# Patient Record
Sex: FEMALE | Race: WHITE | Employment: PART TIME | URBAN - METROPOLITAN AREA
[De-identification: names, ages, dates, MRNs, and addresses within clinical notes are randomized per-mention and may not be internally consistent; named-entity substitution may affect disease eponyms.]

---

## 2017-05-01 ENCOUNTER — TELEPHONE (OUTPATIENT)
Dept: PEDIATRICS | Facility: CLINIC | Age: 37
End: 2017-05-01

## 2017-08-26 ENCOUNTER — TRANSFERRED RECORDS (OUTPATIENT)
Dept: HEALTH INFORMATION MANAGEMENT | Facility: CLINIC | Age: 37
End: 2017-08-26

## 2017-08-27 ENCOUNTER — ANESTHESIA (OUTPATIENT)
Dept: SURGERY | Facility: CLINIC | Age: 37
End: 2017-08-27
Payer: COMMERCIAL

## 2017-08-27 ENCOUNTER — ANESTHESIA EVENT (OUTPATIENT)
Dept: SURGERY | Facility: CLINIC | Age: 37
End: 2017-08-27
Payer: COMMERCIAL

## 2017-08-27 ENCOUNTER — APPOINTMENT (OUTPATIENT)
Dept: ULTRASOUND IMAGING | Facility: CLINIC | Age: 37
End: 2017-08-27
Attending: EMERGENCY MEDICINE
Payer: COMMERCIAL

## 2017-08-27 ENCOUNTER — SURGERY (OUTPATIENT)
Age: 37
End: 2017-08-27

## 2017-08-27 ENCOUNTER — HOSPITAL ENCOUNTER (EMERGENCY)
Facility: CLINIC | Age: 37
Discharge: HOME | End: 2017-08-27
Attending: EMERGENCY MEDICINE | Admitting: OBSTETRICS & GYNECOLOGY
Payer: COMMERCIAL

## 2017-08-27 VITALS
TEMPERATURE: 97.8 F | SYSTOLIC BLOOD PRESSURE: 89 MMHG | WEIGHT: 131 LBS | RESPIRATION RATE: 16 BRPM | HEART RATE: 102 BPM | OXYGEN SATURATION: 100 % | HEIGHT: 66 IN | BODY MASS INDEX: 21.05 KG/M2 | DIASTOLIC BLOOD PRESSURE: 51 MMHG

## 2017-08-27 DIAGNOSIS — O00.109 TUBAL PREGNANCY WITHOUT INTRAUTERINE PREGNANCY: ICD-10-CM

## 2017-08-27 DIAGNOSIS — Z98.890 S/P LAPAROSCOPIC SURGERY: Primary | ICD-10-CM

## 2017-08-27 LAB
ABO + RH BLD: NORMAL
ANION GAP SERPL CALCULATED.3IONS-SCNC: 8 MMOL/L (ref 3–14)
B-HCG SERPL-ACNC: 190 IU/L (ref 0–5)
BASOPHILS # BLD AUTO: 0 10E9/L (ref 0–0.2)
BASOPHILS NFR BLD AUTO: 0.2 %
BLD GP AB SCN SERPL QL: NORMAL
BLOOD BANK CMNT PATIENT-IMP: NORMAL
BUN SERPL-MCNC: 11 MG/DL (ref 7–30)
CALCIUM SERPL-MCNC: 9 MG/DL (ref 8.5–10.1)
CHLORIDE SERPL-SCNC: 104 MMOL/L (ref 94–109)
CO2 SERPL-SCNC: 25 MMOL/L (ref 20–32)
CREAT SERPL-MCNC: 0.71 MG/DL (ref 0.52–1.04)
DIFFERENTIAL METHOD BLD: ABNORMAL
DIFFERENTIAL METHOD BLD: NORMAL
EOSINOPHIL # BLD AUTO: 0.1 10E9/L (ref 0–0.7)
EOSINOPHIL NFR BLD AUTO: 0.5 %
ERYTHROCYTE [DISTWIDTH] IN BLOOD BY AUTOMATED COUNT: 12.4 % (ref 10–15)
ERYTHROCYTE [DISTWIDTH] IN BLOOD BY AUTOMATED COUNT: NORMAL % (ref 10–15)
FETAL CELL SCN BLD QL ROSETTE: NORMAL
GFR SERPL CREATININE-BSD FRML MDRD: >90 ML/MIN/1.7M2
GLUCOSE SERPL-MCNC: 100 MG/DL (ref 70–99)
HCT VFR BLD AUTO: 32.8 % (ref 35–47)
HCT VFR BLD AUTO: NORMAL % (ref 35–47)
HGB BLD-MCNC: 11.3 G/DL (ref 11.7–15.7)
HGB BLD-MCNC: NORMAL G/DL (ref 11.7–15.7)
IMM GRANULOCYTES # BLD: 0 10E9/L (ref 0–0.4)
IMM GRANULOCYTES NFR BLD: 0.2 %
LYMPHOCYTES # BLD AUTO: 1.8 10E9/L (ref 0.8–5.3)
LYMPHOCYTES NFR BLD AUTO: 14.8 %
MCH RBC QN AUTO: 31.6 PG (ref 26.5–33)
MCH RBC QN AUTO: NORMAL PG (ref 26.5–33)
MCHC RBC AUTO-ENTMCNC: 34.5 G/DL (ref 31.5–36.5)
MCHC RBC AUTO-ENTMCNC: NORMAL G/DL (ref 31.5–36.5)
MCV RBC AUTO: 92 FL (ref 78–100)
MCV RBC AUTO: NORMAL FL (ref 78–100)
MONOCYTES # BLD AUTO: 0.5 10E9/L (ref 0–1.3)
MONOCYTES NFR BLD AUTO: 4.1 %
NEUTROPHILS # BLD AUTO: 9.6 10E9/L (ref 1.6–8.3)
NEUTROPHILS NFR BLD AUTO: 80.2 %
NRBC # BLD AUTO: 0 10*3/UL
NRBC BLD AUTO-RTO: 0 /100
PLATELET # BLD AUTO: 274 10E9/L (ref 150–450)
PLATELET # BLD AUTO: NORMAL 10E9/L (ref 150–450)
POTASSIUM SERPL-SCNC: 4.1 MMOL/L (ref 3.4–5.3)
RADIOLOGIST FLAGS: ABNORMAL
RBC # BLD AUTO: 3.58 10E12/L (ref 3.8–5.2)
RBC # BLD AUTO: NORMAL 10E12/L (ref 3.8–5.2)
RH IG VIALS RECOM PATIENT: NORMAL
SODIUM SERPL-SCNC: 137 MMOL/L (ref 133–144)
SPECIMEN EXP DATE BLD: NORMAL
SPECIMEN EXP DATE BLD: NORMAL
WBC # BLD AUTO: 12 10E9/L (ref 4–11)
WBC # BLD AUTO: NORMAL 10E9/L (ref 4–11)

## 2017-08-27 PROCEDURE — 88305 TISSUE EXAM BY PATHOLOGIST: CPT | Mod: 26 | Performed by: OBSTETRICS & GYNECOLOGY

## 2017-08-27 PROCEDURE — 80048 BASIC METABOLIC PNL TOTAL CA: CPT | Performed by: EMERGENCY MEDICINE

## 2017-08-27 PROCEDURE — 85025 COMPLETE CBC W/AUTO DIFF WBC: CPT | Performed by: EMERGENCY MEDICINE

## 2017-08-27 PROCEDURE — 25000128 H RX IP 250 OP 636: Performed by: EMERGENCY MEDICINE

## 2017-08-27 PROCEDURE — 84702 CHORIONIC GONADOTROPIN TEST: CPT | Performed by: EMERGENCY MEDICINE

## 2017-08-27 PROCEDURE — 96360 HYDRATION IV INFUSION INIT: CPT

## 2017-08-27 PROCEDURE — 76830 TRANSVAGINAL US NON-OB: CPT

## 2017-08-27 PROCEDURE — 37000008 ZZH ANESTHESIA TECHNICAL FEE, 1ST 30 MIN: Performed by: OBSTETRICS & GYNECOLOGY

## 2017-08-27 PROCEDURE — 25000128 H RX IP 250 OP 636: Performed by: NURSE ANESTHETIST, CERTIFIED REGISTERED

## 2017-08-27 PROCEDURE — 76801 OB US < 14 WKS SINGLE FETUS: CPT

## 2017-08-27 PROCEDURE — 25000125 ZZHC RX 250: Performed by: OBSTETRICS & GYNECOLOGY

## 2017-08-27 PROCEDURE — 25000128 H RX IP 250 OP 636: Performed by: ANESTHESIOLOGY

## 2017-08-27 PROCEDURE — 71000013 ZZH RECOVERY PHASE 1 LEVEL 1 EA ADDTL HR: Performed by: OBSTETRICS & GYNECOLOGY

## 2017-08-27 PROCEDURE — 25000125 ZZHC RX 250: Performed by: NURSE ANESTHETIST, CERTIFIED REGISTERED

## 2017-08-27 PROCEDURE — 88305 TISSUE EXAM BY PATHOLOGIST: CPT | Performed by: OBSTETRICS & GYNECOLOGY

## 2017-08-27 PROCEDURE — 27210794 ZZH OR GENERAL SUPPLY STERILE: Performed by: OBSTETRICS & GYNECOLOGY

## 2017-08-27 PROCEDURE — 86901 BLOOD TYPING SEROLOGIC RH(D): CPT | Performed by: EMERGENCY MEDICINE

## 2017-08-27 PROCEDURE — 25000566 ZZH SEVOFLURANE, EA 15 MIN: Performed by: OBSTETRICS & GYNECOLOGY

## 2017-08-27 PROCEDURE — 99284 EMERGENCY DEPT VISIT MOD MDM: CPT | Mod: 25

## 2017-08-27 PROCEDURE — 86850 RBC ANTIBODY SCREEN: CPT | Performed by: EMERGENCY MEDICINE

## 2017-08-27 PROCEDURE — 25000128 H RX IP 250 OP 636: Performed by: SURGERY

## 2017-08-27 PROCEDURE — 85461 HEMOGLOBIN FETAL: CPT | Performed by: EMERGENCY MEDICINE

## 2017-08-27 PROCEDURE — 37000009 ZZH ANESTHESIA TECHNICAL FEE, EACH ADDTL 15 MIN: Performed by: OBSTETRICS & GYNECOLOGY

## 2017-08-27 PROCEDURE — 36000058 ZZH SURGERY LEVEL 3 EA 15 ADDTL MIN: Performed by: OBSTETRICS & GYNECOLOGY

## 2017-08-27 PROCEDURE — S0020 INJECTION, BUPIVICAINE HYDRO: HCPCS | Performed by: OBSTETRICS & GYNECOLOGY

## 2017-08-27 PROCEDURE — 40000170 ZZH STATISTIC PRE-PROCEDURE ASSESSMENT II: Performed by: OBSTETRICS & GYNECOLOGY

## 2017-08-27 PROCEDURE — 71000012 ZZH RECOVERY PHASE 1 LEVEL 1 FIRST HR: Performed by: OBSTETRICS & GYNECOLOGY

## 2017-08-27 PROCEDURE — 36000056 ZZH SURGERY LEVEL 3 1ST 30 MIN: Performed by: OBSTETRICS & GYNECOLOGY

## 2017-08-27 PROCEDURE — 86900 BLOOD TYPING SEROLOGIC ABO: CPT | Performed by: EMERGENCY MEDICINE

## 2017-08-27 RX ORDER — GLYCOPYRROLATE 0.2 MG/ML
INJECTION, SOLUTION INTRAMUSCULAR; INTRAVENOUS PRN
Status: DISCONTINUED | OUTPATIENT
Start: 2017-08-27 | End: 2017-08-27

## 2017-08-27 RX ORDER — FENTANYL CITRATE 0.05 MG/ML
25-50 INJECTION, SOLUTION INTRAMUSCULAR; INTRAVENOUS
Status: DISCONTINUED | OUTPATIENT
Start: 2017-08-27 | End: 2017-08-27 | Stop reason: HOSPADM

## 2017-08-27 RX ORDER — SODIUM CHLORIDE, SODIUM LACTATE, POTASSIUM CHLORIDE, CALCIUM CHLORIDE 600; 310; 30; 20 MG/100ML; MG/100ML; MG/100ML; MG/100ML
INJECTION, SOLUTION INTRAVENOUS CONTINUOUS
Status: DISCONTINUED | OUTPATIENT
Start: 2017-08-27 | End: 2017-08-27 | Stop reason: HOSPADM

## 2017-08-27 RX ORDER — OXYCODONE HYDROCHLORIDE 5 MG/1
5-10 TABLET ORAL
Status: CANCELLED | OUTPATIENT
Start: 2017-08-27

## 2017-08-27 RX ORDER — ACETAMINOPHEN 325 MG/1
650 TABLET ORAL
Status: CANCELLED | OUTPATIENT
Start: 2017-08-27

## 2017-08-27 RX ORDER — CHLORHEXIDINE GLUCONATE 40 MG/ML
SOLUTION TOPICAL ONCE
Status: DISCONTINUED | OUTPATIENT
Start: 2017-08-27 | End: 2017-08-27 | Stop reason: HOSPADM

## 2017-08-27 RX ORDER — OXYCODONE HYDROCHLORIDE 5 MG/1
5-10 TABLET ORAL EVERY 4 HOURS PRN
Qty: 20 TABLET | Refills: 0 | Status: SHIPPED | OUTPATIENT
Start: 2017-08-27 | End: 2018-05-15

## 2017-08-27 RX ORDER — LIDOCAINE HYDROCHLORIDE 20 MG/ML
INJECTION, SOLUTION INFILTRATION; PERINEURAL PRN
Status: DISCONTINUED | OUTPATIENT
Start: 2017-08-27 | End: 2017-08-27

## 2017-08-27 RX ORDER — ACETAMINOPHEN 10 MG/ML
1000 INJECTION, SOLUTION INTRAVENOUS ONCE
Status: DISCONTINUED | OUTPATIENT
Start: 2017-08-27 | End: 2017-08-27 | Stop reason: HOSPADM

## 2017-08-27 RX ORDER — ACETAMINOPHEN 325 MG/1
650 TABLET ORAL EVERY 4 HOURS PRN
Qty: 100 TABLET | Refills: 0 | Status: SHIPPED | OUTPATIENT
Start: 2017-08-27

## 2017-08-27 RX ORDER — BUPIVACAINE HYDROCHLORIDE 5 MG/ML
INJECTION, SOLUTION PERINEURAL PRN
Status: DISCONTINUED | OUTPATIENT
Start: 2017-08-27 | End: 2017-08-27 | Stop reason: HOSPADM

## 2017-08-27 RX ORDER — VECURONIUM BROMIDE 1 MG/ML
INJECTION, POWDER, LYOPHILIZED, FOR SOLUTION INTRAVENOUS PRN
Status: DISCONTINUED | OUTPATIENT
Start: 2017-08-27 | End: 2017-08-27

## 2017-08-27 RX ORDER — IBUPROFEN 600 MG/1
600 TABLET, FILM COATED ORAL EVERY 6 HOURS PRN
Qty: 30 TABLET | Refills: 0 | Status: SHIPPED | OUTPATIENT
Start: 2017-08-27 | End: 2018-05-15

## 2017-08-27 RX ORDER — ONDANSETRON 4 MG/1
4 TABLET, ORALLY DISINTEGRATING ORAL
Status: CANCELLED | OUTPATIENT
Start: 2017-08-27

## 2017-08-27 RX ORDER — KETOROLAC TROMETHAMINE 30 MG/ML
30 INJECTION, SOLUTION INTRAMUSCULAR; INTRAVENOUS
Status: DISCONTINUED | OUTPATIENT
Start: 2017-08-27 | End: 2017-08-27 | Stop reason: HOSPADM

## 2017-08-27 RX ORDER — MEPERIDINE HYDROCHLORIDE 25 MG/ML
12.5 INJECTION INTRAMUSCULAR; INTRAVENOUS; SUBCUTANEOUS EVERY 5 MIN PRN
Status: DISCONTINUED | OUTPATIENT
Start: 2017-08-27 | End: 2017-08-27 | Stop reason: HOSPADM

## 2017-08-27 RX ORDER — PROPOFOL 10 MG/ML
INJECTION, EMULSION INTRAVENOUS PRN
Status: DISCONTINUED | OUTPATIENT
Start: 2017-08-27 | End: 2017-08-27

## 2017-08-27 RX ORDER — EPHEDRINE SULFATE 50 MG/ML
INJECTION, SOLUTION INTRAMUSCULAR; INTRAVENOUS; SUBCUTANEOUS PRN
Status: DISCONTINUED | OUTPATIENT
Start: 2017-08-27 | End: 2017-08-27

## 2017-08-27 RX ORDER — KETOROLAC TROMETHAMINE 30 MG/ML
INJECTION, SOLUTION INTRAMUSCULAR; INTRAVENOUS PRN
Status: DISCONTINUED | OUTPATIENT
Start: 2017-08-27 | End: 2017-08-27

## 2017-08-27 RX ORDER — PROPOFOL 10 MG/ML
INJECTION, EMULSION INTRAVENOUS CONTINUOUS PRN
Status: DISCONTINUED | OUTPATIENT
Start: 2017-08-27 | End: 2017-08-27

## 2017-08-27 RX ORDER — ONDANSETRON 2 MG/ML
4 INJECTION INTRAMUSCULAR; INTRAVENOUS EVERY 30 MIN PRN
Status: DISCONTINUED | OUTPATIENT
Start: 2017-08-27 | End: 2017-08-27 | Stop reason: HOSPADM

## 2017-08-27 RX ORDER — NEOSTIGMINE METHYLSULFATE 1 MG/ML
VIAL (ML) INJECTION PRN
Status: DISCONTINUED | OUTPATIENT
Start: 2017-08-27 | End: 2017-08-27

## 2017-08-27 RX ORDER — ONDANSETRON 2 MG/ML
INJECTION INTRAMUSCULAR; INTRAVENOUS PRN
Status: DISCONTINUED | OUTPATIENT
Start: 2017-08-27 | End: 2017-08-27

## 2017-08-27 RX ORDER — ONDANSETRON 4 MG/1
4 TABLET, ORALLY DISINTEGRATING ORAL EVERY 30 MIN PRN
Status: DISCONTINUED | OUTPATIENT
Start: 2017-08-27 | End: 2017-08-27 | Stop reason: HOSPADM

## 2017-08-27 RX ORDER — METOPROLOL TARTRATE 1 MG/ML
1-2 INJECTION, SOLUTION INTRAVENOUS EVERY 5 MIN PRN
Status: DISCONTINUED | OUTPATIENT
Start: 2017-08-27 | End: 2017-08-27 | Stop reason: HOSPADM

## 2017-08-27 RX ORDER — DEXAMETHASONE SODIUM PHOSPHATE 4 MG/ML
INJECTION, SOLUTION INTRA-ARTICULAR; INTRALESIONAL; INTRAMUSCULAR; INTRAVENOUS; SOFT TISSUE PRN
Status: DISCONTINUED | OUTPATIENT
Start: 2017-08-27 | End: 2017-08-27

## 2017-08-27 RX ADMIN — LIDOCAINE HYDROCHLORIDE 80 MG: 20 INJECTION, SOLUTION INFILTRATION; PERINEURAL at 05:28

## 2017-08-27 RX ADMIN — PROCHLORPERAZINE EDISYLATE 5 MG: 5 INJECTION INTRAMUSCULAR; INTRAVENOUS at 07:11

## 2017-08-27 RX ADMIN — GLYCOPYRROLATE 0.4 MG: 0.2 INJECTION, SOLUTION INTRAMUSCULAR; INTRAVENOUS at 06:43

## 2017-08-27 RX ADMIN — PROPOFOL 160 MG: 10 INJECTION, EMULSION INTRAVENOUS at 05:28

## 2017-08-27 RX ADMIN — HYDROMORPHONE HYDROCHLORIDE 0.5 MG: 1 INJECTION, SOLUTION INTRAMUSCULAR; INTRAVENOUS; SUBCUTANEOUS at 07:15

## 2017-08-27 RX ADMIN — Medication 7.5 MG: at 05:53

## 2017-08-27 RX ADMIN — PHENYLEPHRINE HYDROCHLORIDE 50 MCG: 10 INJECTION, SOLUTION INTRAMUSCULAR; INTRAVENOUS; SUBCUTANEOUS at 05:47

## 2017-08-27 RX ADMIN — SODIUM CHLORIDE, POTASSIUM CHLORIDE, SODIUM LACTATE AND CALCIUM CHLORIDE: 600; 310; 30; 20 INJECTION, SOLUTION INTRAVENOUS at 05:53

## 2017-08-27 RX ADMIN — ONDANSETRON 4 MG: 2 INJECTION INTRAMUSCULAR; INTRAVENOUS at 06:36

## 2017-08-27 RX ADMIN — SUCCINYLCHOLINE CHLORIDE 70 MG: 20 INJECTION, SOLUTION INTRAMUSCULAR; INTRAVENOUS at 05:28

## 2017-08-27 RX ADMIN — SODIUM CHLORIDE, POTASSIUM CHLORIDE, SODIUM LACTATE AND CALCIUM CHLORIDE 25 ML/HR: 600; 310; 30; 20 INJECTION, SOLUTION INTRAVENOUS at 05:17

## 2017-08-27 RX ADMIN — DEXAMETHASONE SODIUM PHOSPHATE 4 MG: 4 INJECTION, SOLUTION INTRA-ARTICULAR; INTRALESIONAL; INTRAMUSCULAR; INTRAVENOUS; SOFT TISSUE at 05:50

## 2017-08-27 RX ADMIN — PHENYLEPHRINE HYDROCHLORIDE 50 MCG: 10 INJECTION, SOLUTION INTRAMUSCULAR; INTRAVENOUS; SUBCUTANEOUS at 05:42

## 2017-08-27 RX ADMIN — SODIUM CHLORIDE 1000 ML: 9 INJECTION, SOLUTION INTRAVENOUS at 02:41

## 2017-08-27 RX ADMIN — SODIUM CHLORIDE, POTASSIUM CHLORIDE, SODIUM LACTATE AND CALCIUM CHLORIDE: 600; 310; 30; 20 INJECTION, SOLUTION INTRAVENOUS at 06:45

## 2017-08-27 RX ADMIN — VECURONIUM BROMIDE 3 MG: 1 INJECTION, POWDER, LYOPHILIZED, FOR SOLUTION INTRAVENOUS at 05:39

## 2017-08-27 RX ADMIN — DEXMEDETOMIDINE HYDROCHLORIDE 8 MCG: 100 INJECTION, SOLUTION INTRAVENOUS at 06:42

## 2017-08-27 RX ADMIN — KETOROLAC TROMETHAMINE 30 MG: 30 INJECTION, SOLUTION INTRAMUSCULAR at 06:39

## 2017-08-27 RX ADMIN — PROPOFOL 100 MCG/KG/MIN: 10 INJECTION, EMULSION INTRAVENOUS at 05:47

## 2017-08-27 RX ADMIN — MIDAZOLAM HYDROCHLORIDE 2 MG: 1 INJECTION, SOLUTION INTRAMUSCULAR; INTRAVENOUS at 05:23

## 2017-08-27 RX ADMIN — BUPIVACAINE HYDROCHLORIDE 6 ML: 5 INJECTION, SOLUTION PERINEURAL at 06:39

## 2017-08-27 RX ADMIN — NEOSTIGMINE METHYLSULFATE 3 MG: 1 INJECTION INTRAMUSCULAR; INTRAVENOUS; SUBCUTANEOUS at 06:43

## 2017-08-27 ASSESSMENT — ENCOUNTER SYMPTOMS
DYSRHYTHMIAS: 0
ABDOMINAL PAIN: 1

## 2017-08-27 NOTE — DISCHARGE INSTRUCTIONS
Same Day Surgery Discharge Instructions for  Sedation and General Anesthesia       It's not unusual to feel dizzy, light-headed or faint for up to 24 hours after surgery or while taking pain medication.  If you have these symptoms: sit for a few minutes before standing and have someone assist you when you get up to walk or use the bathroom.      You should rest and relax for the next 24 hours. We recommend you make arrangements to have an adult stay with you for at least 24 hours after your discharge.  Avoid hazardous and strenuous activity.      DO NOT DRIVE any vehicle or operate mechanical equipment for 24 hours following the end of your surgery.  Even though you may feel normal, your reactions may be affected by the medication you have received.      Do not drink alcoholic beverages for 24 hours following surgery.       Slowly progress to your regular diet as you feel able. It's not unusual to feel nauseated and/or vomit after receiving anesthesia.  If you develop these symptoms, drink clear liquids (apple juice, ginger ale, broth, 7-up, etc. ) until you feel better.  If your nausea and vomiting persists for 24 hours, please notify your surgeon.        All narcotic pain medications, along with inactivity and anesthesia, can cause constipation. Drinking plenty of liquids and increasing fiber intake will help.      For any questions of a medical nature, call your surgeon.      Do not make important decisions for 24 hours.      If you had general anesthesia, you may have a sore throat for a couple of days related to the breathing tube used during surgery.  You may use Cepacol lozenges to help with this discomfort.  If it worsens or if you develop a fever, contact your surgeon.       If you feel your pain is not well managed with the pain medications prescribed by your surgeon, please contact your surgeon's office to let them know so they can address your concerns.         **If you have questions or concerns about  your procedure,   call Dr. Tong at [phone]**

## 2017-08-27 NOTE — ED PROVIDER NOTES
History     Chief Complaint:  Abdominal Pain     HPI   Yvette Garcia is a 36 year old female who presents to the emergency department for evaluation of abdominal pain and vaginal bleeding. The patient states that she has had approximately 1.5 months of spotty vaginal bleeding, often requiring a single pad or tampon daily, though is currently on oral contraceptives.The patient stopped taking this medication two days ago per her OB/GYN's recommendation and is scheduled to start a different contraceptive next week. She was seen on 8/1 by her OB/GYN for this bleeding, and had a normal pelvic exam at that time. She notes that since then. she has had two episodes of sudden, sharp suprapubic abdominal pain in the past three weeks, which have generally resolved spontaneously after approximately 1-2 hours. She notes that she then had another episode of this pain this evening while at her cabin in [place] and decided to seek evaluation in the [facility] ER. At this visit, the patient was found to have a positive HCG urine and was sent here to [facility] for further evaluation, including US. The patient notes that her pain is improved here on arrival to the ED, though has not resolved completely. She notes that she has three healthy children, youngest 15 months in age, since she delivered vaginally without issue, with the exception of postdelivery hemorrhage with her first children.     Allergies:  NKDA    Medications:    Albuterol   Oral contraceptives     Past Medical History:    Asthma    Past Surgical History:    ENT surgery    Family History:    hyperlipidemia    Social History:  Presents with her .   Negative for tobacco use.  Negative for alcohol use.  Marital Status:   [2]    Review of Systems   Gastrointestinal: Positive for abdominal pain.   Genitourinary: Positive for vaginal bleeding.   All other systems reviewed and are negative.      Physical Exam   First Vitals:  BP:  "126/80  Heart Rate: 121  Temp: 98.6  F (37  C)  Resp: 16  Height: 167.6 cm (5' 6\")  Weight: 59.4 kg (131 lb)  SpO2: 100 %      Physical Exam  General:                        Well-nourished                        Speaking in full sentences  Eyes:                        Conjunctiva without injection or scleral icterus                        PERRL  ENT:                        Moist mucous membranes                        Posterior oropharynx clear without erythema or exudate                        Nares patent                        Pinnae normal  Neck:                        Full ROM                        No stiffness appreciated  Resp:                        Lungs CTAB                        No crackles, wheezing or audible rubs                        Good air movement  CV:                                        Tachycardic rate, regular rhythm                        S1 and S2 present                        No murmur, gallop or rub  GI:                        BS present                        Abdomen soft without distention                        Mild tenderness to palpation in supra-pubic region                        No guarding or rebound tenderness  Skin:                        Warm, dry, well perfused                        No rashes or open wounds on exposed skin  MSK:                        Moves all extremities                        No focal deformities or swelling  Neuro:                        Alert                        Answers questions appropriately                        Moves all extremities equally                        Gait stable  Psych:                        Normal affect, normal mood     Emergency Department Course   Imaging:  Radiographic findings were communicated with the patient who voiced understanding of the findings.    US OB <14 weeks with transvaginal:  1. No IUP.  2. With positive pregnancy test the differential includes an early  pregnancy which is not yet seen, fetal demise, or " ectopic pregnancy.  3. A complex mass in the left adnexal region is indeterminate. Ectopic  pregnancy is a definite possibility. Alternatively, some of this could  be due to a thickened left fallopian tube and/or some focal hemorrhage  in the left adnexal region.  4. Recommend correlation with serial quantitative beta HCG levels. As per radiology.     Laboratory:  CBC: WBC: 12.0 (H), HGB: 11.3 (L), PLT: 274  BMP: Glucose 100 (H), o/w WNL (Creatinine: 0.71)    Rh Type: O positive     HCG quantitative pregnancy: 190 (H)    Interventions:  0241 NS 1L IV    Emergency Department Course:  Nursing notes and vitals reviewed. 0136 I performed an exam of the patient as documented above.     IV inserted. Medicine administered as documented above. Blood drawn. This was sent to the lab for further testing, results above.    The patient was sent for a OB US while in the emergency department, findings above.     0304 I consulted with Dr. Parekh, radiology, regarding the patient's imaging here in the emergency department.    0306 I rechecked the patient and discussed the results of her workup thus far.     0324 I consulted with Dr. Tong, OB/GYN, regarding the patient's history and presentation here in the emergency department. They agreed to evaluate the patient here in the ED.     0510 Dr. Tong is here in the ED at the patient's bedside. We are in agreement with plan for admission.     Findings and plan explained to the Patient who consents to admission. Discussed the patient with Dr. Tong, who will admit the patient to a medical bed for further monitoring, evaluation, and treatment.    Impression & Plan    Medical Decision Making:  Yvette Garcia is a 36 year old female who present to the ED for evaluation of positive pregnancy test, abdominal pain, and vaginal spotting. Vital signs on presentation reveal elevated heart rate, with improved during her ED course. Workup in the ED included US and laboratory studies.  Symptoms are concerning for ectopic pregnancy. Pelvis US demonstrates no IUP, though findings of a complex mass in the left adnexal region. Quantitative HCG elevated at 190. The patient is Rh positive and thus does not require Rhogam. Hemoglobin is slightly low at 11.3, down from 12.7 last year. Case discussed with Dr. Tong of OB/GYN. They have graciously seen and evaluated the patient here in the ED. The patient will be made NPO in anticipation of operative intervention for further evaluation of the above abnormality. The patient and  were updated at bedside. Symptoms were well controlled.  Patient will go to the OR for diagnostic laparoscopy with Dr. Tong. Questions answered prior to admission.     Diagnosis:    ICD-10-CM   1. Tubal pregnancy without intrauterine pregnancy O00.10       Disposition:  Admitted to Dr. Tong, OB/GYN    IPetrona am serving as a scribe on 8/27/2017 at 1:36 AM to personally document services performed by Ky Chew MD based on my observations and the provider's statements to me.     Petrona Barillas  8/27/2017    EMERGENCY DEPARTMENT       Ky Chew MD  08/27/17 0710

## 2017-08-27 NOTE — ANESTHESIA POSTPROCEDURE EVALUATION
Patient: Yvette Garcia    Procedure(s):  DIAGNOSTIC LAPAROSCOPY, LEFT SALPINGECTOMY - Wound Class: I-Clean    Diagnosis:ectopic pregnancy  Diagnosis Additional Information: No value filed.    Anesthesia Type:  General, RSI, ETT    Note:  Anesthesia Post Evaluation    Patient location during evaluation: PACU  Patient participation: Able to fully participate in evaluation  Level of consciousness: sleepy but conscious and responsive to verbal stimuli  Pain management: adequate  Airway patency: patent  Cardiovascular status: acceptable and hemodynamically stable  Respiratory status: acceptable and unassisted  Hydration status: acceptable  PONV: none     Anesthetic complications: None          Last vitals:  Vitals:    08/27/17 0710 08/27/17 0715 08/27/17 0720   BP: (!) 77/59  91/65   Pulse:      Resp: 19  12   Temp:      SpO2: 99% 99% 97%         Electronically Signed By: Ponce Jain MD  August 27, 2017  7:25 AM

## 2017-08-27 NOTE — PROCEDURES
BRIEF AND FULL OPERATIVE NOTE     Preoperative Diagnosis:    1. Pelvic pain  2. Abnormal uterine bleeding  3. Positive pregnancy test  4. Left adnexal mass with no intrauterine pregnancy identified on ultrasound    Postoperative Diagnosis:    Same + left ectopic pregnancy with hemoperitoneum      Procedure:   Diagnostic laparoscopy, left salpingectomy     Surgeon:  Yvette Tong MD     Assistant: Kaylee Bob MD     Anesthesia: General     Findings: Anteverted uterus on bimanual exam, old blood at cervical os, no dilation noted, pelvis with ~100mL of hemoperitoneum, normal appearing uterus, normal appearing ovaries bilaterally, right tube normal in appearance, left fallopian tube dilated distally with large blood clot extruding from fimbria appearing consistent with ectopic pregnancy no evidence of tubal rupture blood appeared to be extruding through the fimbria     EBL: 10mL for procedure, 100mL hemoperitoneum    UOP: 800mL     Specimens:   Left fallopian tube with ectopic pregnancy     Indication for procedure:  Yvette Garcia is a 37 y/o  at unknown gestational age who presented to the emergency department with abnormal uterine bleeding, pelvic pain and new positive pregnancy test.  Ultrasound in the emergency department revealed a heterogenous mass of the left adnexa with thin endometrial stripe and no evidence of IUP, it also demonstrated free fluid. Given the above findings there was significant concern for ectopic pregnancy.  We discussed that given the above surgical management is highly recommended.  We reviewed the risks, benefits and alternatives of laparoscopy.  We discussed that if an ectopic pregnancy is identified that salpingectomy would be recommended.  We discussed that she can still maintain her fertility despite the loss of one fallopian tube.  Consent form was reviewed and signed prior to proceeding to the operating room.     Description of the procedure:  The patient was taken to  the operating room where general anesthesia was administered without difficulty.  She was placed in the dorsal lithotomy position in [facility].  Examination under anesthesia revealed a small anteverted uterus with fullness in the left adnexa.  She was then prepped and draped in the normal sterile fashion.  A time out was performed with two identifiers.  A haynes was then placed under sterile conditions.  An open-sided speculum was then placed in the vagina and the cervix was easily visualized.  The anterior lip of the cervix was then grasped with a single-tooth tenaculum.  An acorn uterine manipulator was then placed without difficulty.  Attention was then turned to the abdomen where 0.50% Marcaine was injected at the umbilicus and an infraumbilical incision was made with the scalpel.  The veress needle was then used to access the peritoneal cavity.  Confirmation of placement with saline drop test was noted.  The abdomen was then insufflated with CO2 gas to 12mmHg.  The veress needle was then removed.  A 5 mm trocar was then inserted under direct visualization.  Placement was confirmed with the laparoscope.  The patient was then placement in trendelenburg to facilitate visualization.  The pelvis was free of adhesions and the aforementioned findings were noted, significant for a hemoperitoneum and dilated left fallopian tube.  A 5 mm trocar was then placed in the right lower quadrant under direct visualization.  An 11 mm trocar was then inserted in the left lower quadrant under direct visualization.  A suction  was then used to remove the noted hemoperitoneum.  The patient's left fallopian tube which was distended and consistent with presence of ectopic pregnancy was then grasped and elevated and the Ligasure device was then utilized to cauterize and divide the fallopian tube from the mesosalpinx in multiple passes.  The fallopian tube was then resected with the Ligasure at the cornua and the fallopian tube  brought out through the 11 mm port in an Endocatch bag.  The specimen was large and the bag was required to be opened outside of the incision, the skin and fascia of the incision was also slightly extended.  The tissue was then removed in smaller pieces with a nik clamp.  The entire specimen was removed.  The fallopian tube and ectopic pregnancy will be sent to pathology.  The pelvis was then copiously irrigated.  The patient was placed in reverse Trendelenburg to facilitate removal of hemoperitoneum and returned to Trendelenburg. The mesosalpinx was inspected and noted to be hemostatic.  The 11-mm port was then closed with a laparoscopic closure device and with 0-Vicryl suture.  CO2 gas was slowly removed from the abdomen and good hemostasis was maintained.  All of the instruments were then removed from the abdomen and remainder of pneumoperitoneum was released.  The 5-mm incisions were closed with 4-0 vicryl, the extended 11 mm trocar site was closed with 4-0 Monocryl in subcuticular fashion and dressed with steri-strips and band-aids.  All instruments were then removed from the vagina.  The haynes catheter was also removed.  The patient tolerated the procedure well.  Sponge, lap and needle counts were correct x 2.  The patient was awakened from general anesthesia and taken to the recovery room in stable condition.    Electronically signed by:  Yvette Tong  [facility]  Pager: [phone]  August 27, 2017

## 2017-08-27 NOTE — ANESTHESIA PREPROCEDURE EVALUATION
Procedure: Procedure(s):  LAPAROSCOPY DIAGNOSTIC (GYN)  Preop diagnosis: ectopic pregnancy    Allergies   Allergen Reactions     Seasonal Allergies      Past Medical History:   Diagnosis Date     History of asthma     as a child     Past Surgical History:   Procedure Laterality Date     C ORAL SURGERY PROCEDURE  1998     CENTRAL LINE      with hemorrhage after delivery of baby     wisdom teeth  2003     Prior to Admission medications    Medication Sig Start Date End Date Taking? Authorizing Provider   order for DME Equipment being ordered: Wrist brace 11/3/16   Ty Flynn MD   ibuprofen (ADVIL,MOTRIN) 400 MG tablet Take 1 tablet (400 mg) by mouth every 6 hours as needed for other (cramping) 5/19/16   Linda Russell MD   albuterol (PROAIR HFA, PROVENTIL HFA, VENTOLIN HFA) 108 (90 BASE) MCG/ACT inhaler Inhale 2 puffs into the lungs every 6 hours    Reported, Patient     Current Facility-Administered Medications Ordered in Epic   Medication Dose Route Frequency Last Rate Last Dose     Blood Bank will determine if patient is eligible for and the proper dosage of Rho (D) immune globulin (RhoGam)   Does not apply Continuous PRN         NO Rho (D)  immune globulin (RhoGam) needed  - mother INELIGIBLE   Does not apply Continuous PRN         chlorhexidine 4 % solution   Topical Once        Or     chlorhexidine 2 % pads   Topical Once        Or     antimicrobial soap   Topical Once         chlorhexidine 4 % solution   Topical Once        Or     chlorhexidine 2 % pads   Topical Once        Or     antimicrobial soap   Topical Once         chlorhexidine 2 % pads   Topical Once        Or     antimicrobial soap   Topical Once         PRE OP antibiotics NOT needed for this surgical procedure  1 each As instructed Continuous         lidocaine 1 % 1 mL  1 mL Other Q1H PRN         sodium chloride (PF) 0.9% PF flush 3 mL  3 mL Intracatheter Q8H         lactated ringers infusion   Intravenous Continuous          ROPivacaine (NAROPIN) epidural    PRN   10 mL at 09/06/13 1624     No current Epic-ordered outpatient prescriptions on file.     Wt Readings from Last 1 Encounters:   08/27/17 59.4 kg (131 lb)     Temp Readings from Last 1 Encounters:   08/27/17 37  C (98.6  F) (Oral)     BP Readings from Last 6 Encounters:   08/27/17 110/72   11/03/16 112/76   05/19/16 105/69   07/22/15 94/60   09/16/13 115/79   09/07/13 107/67     Pulse Readings from Last 4 Encounters:   08/27/17 102   11/03/16 92   07/22/15 84   09/07/13 73     Resp Readings from Last 1 Encounters:   08/27/17 16     SpO2 Readings from Last 1 Encounters:   08/27/17 99%     Recent Labs   Lab Test  08/27/17   0150  09/16/13   0510   NA  137  139   POTASSIUM  4.1  3.9   CHLORIDE  104  104   CO2  25  28   ANIONGAP  8  7   GLC  100*  94   BUN  11  15   CR  0.71  0.73   BRANDIE  9.0  9.5     Recent Labs   Lab Test  09/16/13   0510   AST  22   ALT  38     Recent Labs   Lab Test  08/27/17   0240  08/27/17   0150   WBC  12.0*  Canceled, Test credited   HGB  11.3*  Canceled, Test credited   PLT  274  Canceled, Test credited     No results for input(s): INR in the last 83675 hours.    Invalid input(s): APTT   No results for input(s): TROPI in the last 58825 hours.  RECENT LABS:   ECG:   ECHO:   CXR:      Anesthesia Evaluation     .             ROS/MED HX    ENT/Pulmonary:     (+)asthma Last exacerbation: remote as child, only uses inhaler when pregnant,, . .   (-) sleep apnea   Neurologic:  - neg neurologic ROS     Cardiovascular:  - neg cardiovascular ROS      (-) CHF, GARCIA and arrhythmias   METS/Exercise Tolerance:  >4 METS   Hematologic:  - neg hematologic  ROS       Musculoskeletal:  - neg musculoskeletal ROS       GI/Hepatic:  - neg GI/hepatic ROS       Renal/Genitourinary:  - ROS Renal section negative       Endo:  - neg endo ROS       Psychiatric:  - neg psychiatric ROS       Infectious Disease:  - neg infectious disease ROS       Malignancy:      - no malignancy   Other:  Comment: Ectopic pregnancy   (+) Possibly pregnant no H/O Chronic Pain,                   Physical Exam  Normal systems: dental    Airway   Mallampati: I  TM distance: >3 FB  Neck ROM: full    Dental     Cardiovascular   Rhythm and rate: regular and normal      Pulmonary    breath sounds clear to auscultation                    Anesthesia Plan      History & Physical Review  History and physical reviewed and following examination; no interval change.    ASA Status:  1 .    NPO Status:  > 8 hours    Plan for General, RSI and ETT with Intravenous and Propofol induction. Maintenance will be Balanced.    PONV prophylaxis:  Ondansetron (or other 5HT-3) and Dexamethasone or Solumedrol  Propofol gtt  Less than half mac inhalational       Postoperative Care  Postoperative pain management:  IV analgesics.      Consents  Anesthetic plan, risks, benefits and alternatives discussed with:  Patient..                          .

## 2017-08-27 NOTE — ED NOTES
[facility]  ED Nurse Handoff Report    ED Chief complaint: Abdominal Pain (Seen at [facility] for abd. pain.  UA tested positive for preg.  Sent to be checked for ectopic .  Pain is better)      ED Diagnosis:   Final diagnoses:   Tubal pregnancy without intrauterine pregnancy       Code Status: Full Code    Allergies:   Allergies   Allergen Reactions     Seasonal Allergies        Activity level - Baseline/Home:  Independent    Activity Level - Current:   Independent     Needed?: No    Isolation: No  Infection: Not Applicable    Bariatric?: No    Vital Signs:   Vitals:    08/27/17 0330 08/27/17 0401 08/27/17 0402 08/27/17 0430   BP: 107/79 113/75  122/76   Pulse:       Resp:       Temp:       TempSrc:       SpO2:  100% 98% 100%   Weight:       Height:           Cardiac Rhythm: ,        Pain level: 0-10 Pain Scale: 2    Is this patient confused?: No    Patient Report: Initial Complaint: abdominal pain  Focused Assessment: tachycardic to 130 bpm, otherwise VSS on R/A. Pt had abdominal/pelvic pain and positive pregnancy test (on BC) at OSH, here for concern of ectopic pregnancy/access to GYN services. Cardiac exhibits tachycardia (ST), otherwise WNL. Respiratory WNL, Neuro WNL. Lower mid abdominal/pelvic pain persists at this time. Transvaginal U/S suggests possible ectopic pregnancy, plan for OR.   Tests Performed: labs, U/S  Abnormal Results: U/S shows L adnexal mass, possibly ectopic pregnancy.  Treatments provided: 1 L NS    Family Comments:  at bedside, both work in healthcare    OBS brochure/video discussed/provided to patient: N/A    ED Medications:   Medications   Blood Bank will determine if patient is eligible for and the proper dosage of Rho (D) immune globulin (RhoGam) (not administered)   NO Rho (D)  immune globulin (RhoGam) needed  - mother INELIGIBLE (not administered)   0.9% sodium chloride BOLUS (0 mLs Intravenous Stopped 8/27/17 0404)       Drips infusing?:   No      ED NURSE PHONE NUMBER: [phone]

## 2017-08-27 NOTE — ED NOTES
Pt began having lower abdominal pain today across pelvic region. Went into ED where their cabin is and found out she was pregnant. Came here to have ultrasound done. States pain is better now, not gone but doesn't need anything for it.

## 2017-08-27 NOTE — IP AVS SNAPSHOT
MRN:[id number]                      After Visit Summary   8/27/2017    Yvette Garcia    MRN: [id number]           Thank you!     Thank you for choosing [facility] for your care. Our goal is always to provide you with excellent care. Hearing back from our patients is one way we can continue to improve our services. Please take a few minutes to complete the written survey that you may receive in the mail after you visit with us. Thank you!        Patient Information     Date Of Birth          1980        About your hospital stay     You were admitted on:  N/A You last received care in the:  [facility] PACU    You were discharged on:  August 27, 2017       Who to Call     For medical emergencies, please call 911.  For non-urgent questions about your medical care, please call your primary care provider or clinic, [phone]  For questions related to your surgery, please call your surgery clinic        Attending Provider     Provider Specialty    Ky Chew MD Emergency Medicine       Primary Care Provider Office Phone # Fax #    Carley Ramos [phone]      After Care Instructions     Discharge Instructions       Resume pre procedure diet            Discharge Instructions       Pelvic Rest. No tampons, douching or intercourse for  1  Weeks/until after postoperative visit.            Discharge Instructions       Patient may return to work after post operative visit.            Discharge Instructions       Patient to arrange follow up appointment in 1  Week.            Dressing       Keep dressing clean and dry, may remove band-aids postoperative day 1, if steri strips have not fallen off after 7 days may remove them            No driving or operating machinery       No driving or operating machinery until day after procedure and have completed taking narcotic pain medications.            No lifting       No lifting over 10 pounds and no strenuous  physical activity.  For 1 week.            Shower        Shower on Post-op day  1.   DO NOT take a bath                  Further instructions from your care team       Same Day Surgery Discharge Instructions for  Sedation and General Anesthesia       It's not unusual to feel dizzy, light-headed or faint for up to 24 hours after surgery or while taking pain medication.  If you have these symptoms: sit for a few minutes before standing and have someone assist you when you get up to walk or use the bathroom.      You should rest and relax for the next 24 hours. We recommend you make arrangements to have an adult stay with you for at least 24 hours after your discharge.  Avoid hazardous and strenuous activity.      DO NOT DRIVE any vehicle or operate mechanical equipment for 24 hours following the end of your surgery.  Even though you may feel normal, your reactions may be affected by the medication you have received.      Do not drink alcoholic beverages for 24 hours following surgery.       Slowly progress to your regular diet as you feel able. It's not unusual to feel nauseated and/or vomit after receiving anesthesia.  If you develop these symptoms, drink clear liquids (apple juice, ginger ale, broth, 7-up, etc. ) until you feel better.  If your nausea and vomiting persists for 24 hours, please notify your surgeon.        All narcotic pain medications, along with inactivity and anesthesia, can cause constipation. Drinking plenty of liquids and increasing fiber intake will help.      For any questions of a medical nature, call your surgeon.      Do not make important decisions for 24 hours.      If you had general anesthesia, you may have a sore throat for a couple of days related to the breathing tube used during surgery.  You may use Cepacol lozenges to help with this discomfort.  If it worsens or if you develop a fever, contact your surgeon.       If you feel your pain is not well managed with the pain medications  "prescribed by your surgeon, please contact your surgeon's office to let them know so they can address your concerns.         **If you have questions or concerns about your procedure,   call Dr. Tong at [phone]**      Pending Results     No orders found from 2017 to 2017.            Admission Information     Date & Time Department Dept. Phone    2017 [facility] [phone]      Your Vitals Were     Blood Pressure Pulse Temperature Respirations Height Weight    97/64 102 97.8  F (36.6  C) (Temporal) 16 1.676 m (5' 6\") 59.4 kg (131 lb)    Pulse Oximetry BMI (Body Mass Index)                100% 21.14 kg/m2          MyChart Information     [employer] lets you send messages to your doctor, view your test results, renew your prescriptions, schedule appointments and more. To sign up, go to [email or web address] . Click on \"Log in\" on the left side of the screen, which will take you to the Welcome page. Then click on \"Sign up Now\" on the right side of the page.     You will be asked to enter the access code listed below, as well as some personal information. Please follow the directions to create your username and password.     Your access code is: [email or web address]  Expires: 2017  5:06 AM     Your access code will  in 90 days. If you need help or a new code, please call your [facility] clinic or [phone].        Care EveryWhere ID     This is your Care EveryWhere ID. This could be used by other organizations to access your [facility] medical records  [id number]        Equal Access to Services     JARRET HARPER : Tahir Rizo, alma delia rangel, wilian moreno. [facility] [phone].    ATENCIÓN: Si habla español, tiene a antonio disposición servicios gratuitos de asistencia lingüística. Llame al [phone].    We comply with applicable federal civil rights laws and [place] laws. We do not discriminate on the basis " of race, color, national origin, age, disability sex, sexual orientation or gender identity.               Review of your medicines      UNREVIEWED medicines. Ask your doctor about these medicines        Dose / Directions    albuterol 108 (90 BASE) MCG/ACT Inhaler   Commonly known as:  PROAIR HFA/PROVENTIL HFA/VENTOLIN HFA        Dose:  2 puff   Inhale 2 puffs into the lungs every 6 hours   Refills:  0       * ibuprofen 400 MG tablet   Commonly known as:  ADVIL/MOTRIN   This may have changed:  Another medication with the same name was added. Make sure you understand how and when to take each.   Used for:  Labor, precipitous, delivered   Ask about: Which instructions should I use?        Dose:  400 mg   Take 1 tablet (400 mg) by mouth every 6 hours as needed for other (cramping)   Quantity:  30 tablet   Refills:  0       * ibuprofen 600 MG tablet   Commonly known as:  ADVIL/MOTRIN   This may have changed:  You were already taking a medication with the same name, and this prescription was added. Make sure you understand how and when to take each.   Used for:  Tubal pregnancy without intrauterine pregnancy, S/P laparoscopic surgery   Ask about: Which instructions should I use?        Dose:  600 mg   Take 1 tablet (600 mg) by mouth every 6 hours as needed for pain (mild)   Quantity:  30 tablet   Refills:  0       * Notice:  This list has 2 medication(s) that are the same as other medications prescribed for you. Read the directions carefully, and ask your doctor or other care provider to review them with you.      START taking        Dose / Directions    acetaminophen 325 MG tablet   Commonly known as:  TYLENOL   Used for:  Tubal pregnancy without intrauterine pregnancy, S/P laparoscopic surgery        Dose:  650 mg   Take 2 tablets (650 mg) by mouth every 4 hours as needed for other (mild pain)   Quantity:  100 tablet   Refills:  0       oxyCODONE 5 MG IR tablet   Commonly known as:  ROXICODONE   Used for:  Tubal  pregnancy without intrauterine pregnancy, S/P laparoscopic surgery        Dose:  5-10 mg   Take 1-2 tablets (5-10 mg) by mouth every 4 hours as needed for pain or other (Moderate to Severe)   Quantity:  20 tablet   Refills:  0         CONTINUE these medicines which have NOT CHANGED        Dose / Directions    order for DME   Used for:  Right wrist pain        Equipment being ordered: Wrist brace   Refills:  0            Where to get your medicines      These medications were sent to [facility] [place], [place] - [address]  [address], [place] [address]     Phone:  [phone]     acetaminophen 325 MG tablet    ibuprofen 600 MG tablet         Some of these will need a paper prescription and others can be bought over the counter. Ask your nurse if you have questions.     Bring a paper prescription for each of these medications     oxyCODONE 5 MG IR tablet                Protect others around you: Learn how to safely use, store and throw away your medicines at www.disposemymeds.org.             Medication List: This is a list of all your medications and when to take them. Check marks below indicate your daily home schedule. Keep this list as a reference.      Medications           Morning Afternoon Evening Bedtime As Needed    acetaminophen 325 MG tablet   Commonly known as:  TYLENOL   Take 2 tablets (650 mg) by mouth every 4 hours as needed for other (mild pain)                                albuterol 108 (90 BASE) MCG/ACT Inhaler   Commonly known as:  PROAIR HFA/PROVENTIL HFA/VENTOLIN HFA   Inhale 2 puffs into the lungs every 6 hours                                order for DME   Equipment being ordered: Wrist brace                                oxyCODONE 5 MG IR tablet   Commonly known as:  ROXICODONE   Take 1-2 tablets (5-10 mg) by mouth every 4 hours as needed for pain or other (Moderate to Severe)                                  ASK your doctor about these medications            Morning Afternoon Evening Bedtime As Needed    * ibuprofen 400 MG tablet   Commonly known as:  ADVIL/MOTRIN   Take 1 tablet (400 mg) by mouth every 6 hours as needed for other (cramping)   Ask about: Which instructions should I use?                                * ibuprofen 600 MG tablet   Commonly known as:  ADVIL/MOTRIN   Take 1 tablet (600 mg) by mouth every 6 hours as needed for pain (mild)   Ask about: Which instructions should I use?                                * Notice:  This list has 2 medication(s) that are the same as other medications prescribed for you. Read the directions carefully, and ask your doctor or other care provider to review them with you.

## 2017-08-27 NOTE — ANESTHESIA CARE TRANSFER NOTE
Patient: Yvette Garcia    Procedure(s):  DIAGNOSTIC LAPAROSCOPY, LEFT SALPINGECTOMY - Wound Class: I-Clean    Diagnosis: ectopic pregnancy  Diagnosis Additional Information: No value filed.    Anesthesia Type:   General, RSI, ETT     Note:  Airway :Face Mask  Patient transferred to:PACU  Comments: VSS, exchanging well. Denies pain.      Vitals: (Last set prior to Anesthesia Care Transfer)    CRNA VITALS  8/27/2017 0623 - 8/27/2017 0659      8/27/2017             NIBP: (!)  78/54    NIBP Mean: 69                Electronically Signed By: GOKUL Inman CRNA  August 27, 2017  6:59 AM

## 2017-08-27 NOTE — H&P
Gynecology History and Physical    HPI: 35 y/o  with unknown LMP presents to the emergency department for pelvic pain, irregular bleeding and positive pregnancy test.  The patient has had irregular menses and was last seen at [facility] for annual exam where she was experiencing irregular bleeding.  The plan was to change her ANDREA, she did not have a UPT at that time.  She has experienced 3 episodes of 8-9/10 pain in her pelvis in the month of August.  Most recently two days prior and this evening.  [place] and she experienced pain so they went to a hospital near her [place] where she was noted to have a positive UPT.  The physician there was concerned about possible ectopic pregnancy so they have returned to the [facility] and have presented for further evaluation.  At the present moment her pain is controlled.  Her hcg today is 109.  She is Rh positive.  Her ultrasound was significant for heterogeneous mass in the left adnexa measuring 5cm as well as moderate free fluid.  With these findings it is highly concerning for ectopic pregnancy.  She last ate at 5pm 17.  We discussed that given the size of this mass with positive pregnancy test and pain we would recommend evaluation with laparoscopy and possible removal of the fallopian tube if ectopic pregnancy is identified.  We discussed that Methotrexate would be less desirable given presence of fluid and risk of rupture.    Primary Gyn: Dr. Grant    PMH:  Past Medical History:   Diagnosis Date     History of asthma     as a child     PSH:  Past Surgical History:   Procedure Laterality Date     C ORAL SURGERY PROCEDURE       CENTRAL LINE      with hemorrhage after delivery of baby     wisdom teeth       OBH:  Obstetric History       T2      L3     SAB0   TAB0   Ectopic0   Multiple0   Live Births3       # Outcome Date GA Lbr Keith/2nd Weight Sex Delivery Anes PTL Lv   3 Para 16  00:30 / 00:32  3.359 kg (7 lb 6.5 oz) F Vag-Spont INT  KAREN      Apgar1:  9                Apgar5: 9   2 Term 09/06/13 39w4d 03:50 / 00:31 3.51 kg (7 lb 11.8 oz) F Vag-Spont EPI N KAREN      Name: CUONG DENG      Apgar1:  8                Apgar5: 9   1 Term 09/24/10 40w2d   F Vag-Spont EPI N KAREN      G4 - Current pregnancy    SocH:  Social History   Substance Use Topics     Smoking status: Never Smoker     Smokeless tobacco: Never Used     Alcohol use 0.0 oz/week     0 Standard drinks or equivalent per week     Meds:  Microgestin    Allergies:  Allergies   Allergen Reactions     Seasonal Allergies      ROS: Negative except as in HPI    O:  Vitals:    08/27/17 0330 08/27/17 0401 08/27/17 0402 08/27/17 0430   BP: 107/79 113/75  122/76   Pulse:       Resp:       Temp:       TempSrc:       SpO2:  100% 98% 100%   Weight:       Height:         Gen: Appears comfortable  HEENT: Normocephalic, atraumatic  CV: Tachycardic, regular rhythm  Resp: CTAB  Abd: soft, mildly tender to palpation lower quadrants  Ext: warm, well-perfused    Hemoglobin   Date Value Ref Range Status   08/27/2017 11.3 (L) 11.7 - 15.7 g/dL Final     O+/antibody negative    TVUS 7/27/17:  FINDINGS: Transabdominal followed by endovaginal ultrasound to better  evaluate for early pregnancy. There is no IUP. The uterus measures 7.5  x 5.5 x 4 cm. Endometrial stripe measures 0.3 cm in thickness.     The ovaries are both visualized and within normal limits. Medial to  the left ovary there is a heterogeneous mass-like area measuring 5.3 x  5.0 x 4.1 cm. This contains a rounded area of echogenicity surrounded  by some hypervascularity on color Doppler images and a more  ill-defined adjacent area of mixed echogenicity. Moderate free fluid.         IMPRESSION:  1. No IUP.  2. With positive pregnancy test the differential includes an early  pregnancy which is not yet seen, fetal demise, or ectopic pregnancy.  3. A complex mass in the left adnexal region is indeterminant.  Ectopic  pregnancy is a definite possibility. Alternatively, some of this could  be due to a thickened left fallopian tube and/or some focal hemorrhage  in the left adnexal region.  4. Recommend correlation with serial quantitative beta HCG levels.    A/P: 37 y/o  at unknown gestational age with irregular bleeding, pelvic pain, positive pregnancy test and adnexal mass on ultrasound, concern for ectopic pregnancy  -Given the above findings recommended proceeding with diagnostic laparoscopy to confirm ectopic pregnancy and treat if present with salpingectomy.  Reviewed the risks of the procedure including but not limited to need for blood transfusion, injury to surrounding structures, pain, infection, missed diagnosis.  We reviewed the consent form which was signed prior to proceeding with procedure.  -Last ate yesterday 5pm  -Hgb 11.3, VS stable except mild tachycardia  -Rh positive, no rhogam indicated  -No antibiotics indicated for procedure  -Plan to proceed to the OR for diagnostic laparoscopy, possible salpingectomy    Electronically signed by:  Yvette Tong  [facility] Office  Pager: [phone]  2017

## 2017-08-27 NOTE — IP AVS SNAPSHOT
[place] [address]    [place] [address]    Phone:  [phone]                                       After Visit Summary   8/27/2017    Yvette Garcia    MRN: [id number]           After Visit Summary Signature Page     I have received my discharge instructions, and my questions have been answered. I have discussed any challenges I see with this plan with the nurse or doctor.    ..........................................................................................................................................  Patient/Patient Representative Signature      ..........................................................................................................................................  Patient Representative Print Name and Relationship to Patient    ..................................................               ................................................  Date                                            Time    ..........................................................................................................................................  Reviewed by Signature/Title    ...................................................              ..............................................  Date                                                            Time

## 2017-08-29 LAB — COPATH REPORT: NORMAL

## 2018-05-14 NOTE — PROGRESS NOTES
SUBJECTIVE:   Yvette Garcia is a 37 year old female who presents to clinic today for the following health issues:    Back Pain       Duration: 1 month        Specific cause: none    Description:   Location of pain: middle of back left  Character of pain: sharp, dull ache and constant  Pain radiation: radiates slightly into left hip at times  New numbness or weakness in legs, not attributed to pain:  no     Intensity: Currently 1-2/10, At its worst 4/10    History:   Pain interferes with job: No  History of back problems: no prior back problems  Any previous MRI or X-rays: None  Sees a specialist for back pain:  No  Therapies tried without relief: IBU (helps), chiropractor (saw once, going again today) and heat    Alleviating factors:   Improved by: IBU      Precipitating factors:  Worsened by: Sitting on hard surface and raising to stand up causes shooting pain    Functional and Psychosocial Screen (Conrad STarT Back):      Not performed today        Accompanying Signs & Symptoms:  Risk of Fracture:  None  Risk of Cauda Equina:  None  Risk of Infection:  None  Risk of Cancer:  None  Risk of Ankylosing Spondylitis:  Onset at age <35, male, AND morning back stiffness. no     Worse with getting up from laying or seated position.  Can replicate pain with direct pressure.  Pain does not wake her from sleep.  Does not radiate anywhere.  Is not related to eating.  Improves with ibuprofen, but comes back.  Is always aware of pain.  Lefts and carries her 2 year old toddler on her right side often.  Otherwise, no fevers, chills, no recent cough or SOB.  No changes in diet, appetite, bowel movement.  No urinary complaints.  Menstrual cycle is normal, unchanged.  Last menstrual period 3 weeks ago.  Is on OCPs.    PROBLEMS TO ADD ON:  Patient had left tubal ectopic pregnancy s/p left oophorectomy in October 2017.    Otherwise, no significant PMH.  Childhood asthma.    Problem list and histories reviewed & adjusted, as  indicated.  Additional history: as documented    Patient Active Problem List   Diagnosis     NO ACTIVE PROBLEMS     Indication for care in labor or delivery     Liveborn infant     Labor, precipitous, delivered     Past Surgical History:   Procedure Laterality Date     C ORAL SURGERY PROCEDURE  1998     CENTRAL LINE      with hemorrhage after delivery of baby     LAPAROSCOPY DIAGNOSTIC (GYN) Left 8/27/2017    Procedure: LAPAROSCOPY DIAGNOSTIC (GYN);  DIAGNOSTIC LAPAROSCOPY, LEFT SALPINGECTOMY;  Surgeon: Yvette Tong MD;  Location: [facility] OR     wisdom teeth  2003       Social History   Substance Use Topics     Smoking status: Never Smoker     Smokeless tobacco: Never Used     Alcohol use 0.0 oz/week     0 Standard drinks or equivalent per week     Family History   Problem Relation Age of Onset     Breast Cancer Maternal Aunt      post-menopausal     Breast Cancer Paternal Grandmother      post-menopausal     Hyperlipidemia Mother      Hyperlipidemia Father      Myocardial Infarction Paternal Grandfather      60's         Current Outpatient Prescriptions   Medication Sig Dispense Refill     ibuprofen (ADVIL,MOTRIN) 400 MG tablet Take 1 tablet (400 mg) by mouth every 6 hours as needed for other (cramping) 30 tablet      acetaminophen (TYLENOL) 325 MG tablet Take 2 tablets (650 mg) by mouth every 4 hours as needed for other (mild pain) (Patient not taking: Reported on 5/15/2018) 100 tablet 0     albuterol (PROAIR HFA, PROVENTIL HFA, VENTOLIN HFA) 108 (90 BASE) MCG/ACT inhaler Inhale 2 puffs into the lungs every 6 hours       Allergies   Allergen Reactions     Seasonal Allergies        Reviewed and updated as needed this visit by clinical staff       Reviewed and updated as needed this visit by Provider         ROS:  All other systems on a 10-point review are negative, unless otherwise noted in HPI      OBJECTIVE:     /70 (BP Location: Right arm, Patient Position: Chair, Cuff Size: Adult Regular)  Pulse 123   "Temp 98.8  F (37.1  C) (Oral)  Ht 5' 6\" (1.676 m)  Wt 134 lb 1.6 oz (60.8 kg)  SpO2 99%  BMI 21.64 kg/m2  Body mass index is 21.64 kg/(m^2).  GENERAL: healthy, alert and no distress  NECK: no adenopathy, no asymmetry, masses, or scars and thyroid normal to palpation  RESP: lungs clear to auscultation - no rales, rhonchi or wheezes  CV: regular rate and rhythm, normal S1 S2, no S3 or S4, no murmur, click or rub, no peripheral edema and peripheral pulses strong  ABDOMEN: soft, nontender, no hepatosplenomegaly, no masses and bowel sounds normal  MS: no gross musculoskeletal defects noted, no edema  NEURO: Normal strength and tone, mentation intact and speech normal  BACK: tenderness with direct pressure at right posterior, inferior rib cage, no CVA tenderness, no paralumbar tenderness  Comprehensive back pain exam:  Range of motion not limited by pain, Lower extremity strength functional and equal on both sides, Lower extremity reflexes within normal limits bilaterally and Straight leg raise negative bilaterally    Diagnostic Test Results:  UA normal, urine preg neg    ASSESSMENT/PLAN:       1. Right-sided Flank pain  Etiology includes costochondritis of the posterior rib, kidney (mass, stone, cyst), muscle spasm of the flank/back muscle, gall bladder referred pain, ovarian cyst.  Less likely causes are constipation (not consistent with history), appendicitis, liver etiology.  Will check routine labs.  May consider kidney ultrasound depending on results.  Continue NSAIDS, ice, heating pad and return in 1 week.  - UA reflex to Microscopic and Culture  - Comprehensive metabolic panel (BMP + Alb, Alk Phos, ALT, AST, Total. Bili, TP)  - CBC with platelets  - GGT  - Lipase  - Beta HCG Qual, Urine - [facility] and [place] ([id number])    Will contact with lab results and any follow-up (i.e. Kidney ultrasound)    Maryuri Jonas MD  [facility]  "

## 2018-05-15 ENCOUNTER — OFFICE VISIT (OUTPATIENT)
Dept: PEDIATRICS | Facility: CLINIC | Age: 38
End: 2018-05-15
Payer: COMMERCIAL

## 2018-05-15 VITALS
HEIGHT: 66 IN | HEART RATE: 123 BPM | DIASTOLIC BLOOD PRESSURE: 70 MMHG | OXYGEN SATURATION: 99 % | BODY MASS INDEX: 21.55 KG/M2 | WEIGHT: 134.1 LBS | TEMPERATURE: 98.8 F | SYSTOLIC BLOOD PRESSURE: 106 MMHG

## 2018-05-15 DIAGNOSIS — R10.9 FLANK PAIN: Primary | ICD-10-CM

## 2018-05-15 LAB
ALBUMIN UR-MCNC: NEGATIVE MG/DL
APPEARANCE UR: CLEAR
BETA HCG QUAL IFA URINE: NEGATIVE
BILIRUB UR QL STRIP: NEGATIVE
COLOR UR AUTO: YELLOW
ERYTHROCYTE [DISTWIDTH] IN BLOOD BY AUTOMATED COUNT: 12.1 % (ref 10–15)
GLUCOSE UR STRIP-MCNC: NEGATIVE MG/DL
HCT VFR BLD AUTO: 41.4 % (ref 35–47)
HGB BLD-MCNC: 13.8 G/DL (ref 11.7–15.7)
HGB UR QL STRIP: NEGATIVE
KETONES UR STRIP-MCNC: NEGATIVE MG/DL
LEUKOCYTE ESTERASE UR QL STRIP: NEGATIVE
LIPASE SERPL-CCNC: 173 U/L (ref 73–393)
MCH RBC QN AUTO: 31.2 PG (ref 26.5–33)
MCHC RBC AUTO-ENTMCNC: 33.3 G/DL (ref 31.5–36.5)
MCV RBC AUTO: 94 FL (ref 78–100)
NITRATE UR QL: NEGATIVE
PH UR STRIP: 6 PH (ref 5–7)
PLATELET # BLD AUTO: 315 10E9/L (ref 150–450)
RBC # BLD AUTO: 4.43 10E12/L (ref 3.8–5.2)
SOURCE: NORMAL
SP GR UR STRIP: <=1.005 (ref 1–1.03)
UROBILINOGEN UR STRIP-ACNC: 0.2 EU/DL (ref 0.2–1)
WBC # BLD AUTO: 8.4 10E9/L (ref 4–11)

## 2018-05-15 PROCEDURE — 36415 COLL VENOUS BLD VENIPUNCTURE: CPT | Performed by: INTERNAL MEDICINE

## 2018-05-15 PROCEDURE — 82977 ASSAY OF GGT: CPT | Performed by: INTERNAL MEDICINE

## 2018-05-15 PROCEDURE — 83690 ASSAY OF LIPASE: CPT | Performed by: INTERNAL MEDICINE

## 2018-05-15 PROCEDURE — 80053 COMPREHEN METABOLIC PANEL: CPT | Performed by: INTERNAL MEDICINE

## 2018-05-15 PROCEDURE — 84703 CHORIONIC GONADOTROPIN ASSAY: CPT | Performed by: INTERNAL MEDICINE

## 2018-05-15 PROCEDURE — 83036 HEMOGLOBIN GLYCOSYLATED A1C: CPT | Performed by: INTERNAL MEDICINE

## 2018-05-15 PROCEDURE — 81003 URINALYSIS AUTO W/O SCOPE: CPT | Performed by: INTERNAL MEDICINE

## 2018-05-15 PROCEDURE — 85027 COMPLETE CBC AUTOMATED: CPT | Performed by: INTERNAL MEDICINE

## 2018-05-15 PROCEDURE — 99213 OFFICE O/P EST LOW 20 MIN: CPT | Performed by: INTERNAL MEDICINE

## 2018-05-15 NOTE — MR AVS SNAPSHOT
"              After Visit Summary   5/15/2018    Yvette Garcia    MRN: [id number]           Patient Information     Date Of Birth          1980        Visit Information        Provider Department      5/15/2018 10:10 AM Salud Jonas Mai, MD [facility]        Today's Diagnoses     Flank pain    -  1      Care Instructions    Etiologies may include:  Costochondritis, kidney (stone or cyst), gall bladder, gastro (less likely), ovarian cyst.    Will start with basic labs.  I will contact you with results and any further follow-up.    Follow-up in 1 week.          Follow-ups after your visit        Follow-up notes from your care team     Return in about 1 week (around 5/22/2018).      Who to contact     If you have questions or need follow up information about today's clinic visit or your schedule please contact [facility] directly at [phone].  Normal or non-critical lab and imaging results will be communicated to you by MyChart, letter or phone within 4 business days after the clinic has received the results. If you do not hear from us within 7 days, please contact the clinic through MyChart or phone. If you have a critical or abnormal lab result, we will notify you by phone as soon as possible.  Submit refill requests through [employer] or call your pharmacy and they will forward the refill request to us. Please allow 3 business days for your refill to be completed.          Additional Information About Your Visit        MyChart Information     [employer] lets you send messages to your doctor, view your test results, renew your prescriptions, schedule appointments and more. To sign up, go to [email or web address] . Click on \"Log in\" on the left side of the screen, which will take you to the Welcome page. Then click on \"Sign up Now\" on the right side of the page.     You will be asked to enter the access code listed below, as well as some personal information. Please follow the " "directions to create your username and password.     Your access code is: [email or web address]  Expires: 2018 10:36 AM     Your access code will  in 90 days. If you need help or a new code, please call your [place] clinic or [phone].        Care EveryWhere ID     This is your Care EveryWhere ID. This could be used by other organizations to access your [place] medical records  [id number]        Your Vitals Were     Pulse Temperature Height Pulse Oximetry BMI (Body Mass Index)       123 98.8  F (37.1  C) (Oral) 5' 6\" (1.676 m) 99% 21.64 kg/m2        Blood Pressure from Last 3 Encounters:   05/15/18 106/70   17 (!) 89/51   16 112/76    Weight from Last 3 Encounters:   05/15/18 134 lb 1.6 oz (60.8 kg)   17 131 lb (59.4 kg)   16 146 lb 6.4 oz (66.4 kg)              We Performed the Following     Beta HCG Qual, Urine - [facility] and [place] ([id number])     CBC with platelets     Comprehensive metabolic panel (BMP + Alb, Alk Phos, ALT, AST, Total. Bili, TP)     GGT     Lipase     UA reflex to Microscopic and Culture        Primary Care Provider Office Phone # Fax #    Carley Ramos [phone] [address] [place] [address]        Equal Access to Services     [facility]: Hadii adriana bergerono Sodaphne, waaxda luqadaha, qaybta kaalmada sooyayolis, wilian busch. So [facility] [phone].    ATENCIÓN: Si habla español, tiene a antonio disposición servicios gratuitos de asistencia lingüística. Llame al [phone].    We comply with applicable federal civil rights laws and [place] laws. We do not discriminate on the basis of race, color, national origin, age, disability, sex, sexual orientation, or gender identity.            Thank you!     Thank you for choosing [facility]  for your care. Our goal is always to provide you with excellent care. Hearing back from our patients is one way we can continue to improve our " services. Please take a few minutes to complete the written survey that you may receive in the mail after your visit with us. Thank you!             Your Updated Medication List - Protect others around you: Learn how to safely use, store and throw away your medicines at www.disposemymeds.org.          This list is accurate as of 5/15/18 10:40 AM.  Always use your most recent med list.                   Brand Name Dispense Instructions for use Diagnosis    acetaminophen 325 MG tablet    TYLENOL    100 tablet    Take 2 tablets (650 mg) by mouth every 4 hours as needed for other (mild pain)    Tubal pregnancy without intrauterine pregnancy, S/P laparoscopic surgery       albuterol 108 (90 Base) MCG/ACT Inhaler    PROAIR HFA/PROVENTIL HFA/VENTOLIN HFA     Inhale 2 puffs into the lungs every 6 hours        ibuprofen 400 MG tablet    ADVIL/MOTRIN    30 tablet    Take 1 tablet (400 mg) by mouth every 6 hours as needed for other (cramping)    Labor, precipitous, delivered

## 2018-05-15 NOTE — PATIENT INSTRUCTIONS
Etiologies may include:  Costochondritis, kidney (stone or cyst), gall bladder, gastro (less likely), ovarian cyst.    Will start with basic labs.  I will contact you with results and any further follow-up.    Follow-up in 1 week.

## 2018-05-16 LAB
ALBUMIN SERPL-MCNC: 3.8 G/DL (ref 3.4–5)
ALP SERPL-CCNC: 57 U/L (ref 40–150)
ALT SERPL W P-5'-P-CCNC: 31 U/L (ref 0–50)
ANION GAP SERPL CALCULATED.3IONS-SCNC: 9 MMOL/L (ref 3–14)
AST SERPL W P-5'-P-CCNC: 14 U/L (ref 0–45)
BILIRUB SERPL-MCNC: 0.2 MG/DL (ref 0.2–1.3)
BUN SERPL-MCNC: 9 MG/DL (ref 7–30)
CALCIUM SERPL-MCNC: 9.5 MG/DL (ref 8.5–10.1)
CHLORIDE SERPL-SCNC: 110 MMOL/L (ref 94–109)
CO2 SERPL-SCNC: 23 MMOL/L (ref 20–32)
CREAT SERPL-MCNC: 0.79 MG/DL (ref 0.52–1.04)
GFR SERPL CREATININE-BSD FRML MDRD: 82 ML/MIN/1.7M2
GGT SERPL-CCNC: 12 U/L (ref 0–40)
GLUCOSE SERPL-MCNC: 112 MG/DL (ref 70–99)
HBA1C MFR BLD: 5 % (ref 0–5.6)
POTASSIUM SERPL-SCNC: 4.8 MMOL/L (ref 3.4–5.3)
PROT SERPL-MCNC: 7.4 G/DL (ref 6.8–8.8)
SODIUM SERPL-SCNC: 142 MMOL/L (ref 133–144)

## 2018-05-18 ENCOUNTER — HOSPITAL ENCOUNTER (OUTPATIENT)
Dept: ULTRASOUND IMAGING | Facility: CLINIC | Age: 38
Discharge: HOME | End: 2018-05-18
Attending: INTERNAL MEDICINE | Admitting: INTERNAL MEDICINE
Payer: COMMERCIAL

## 2018-05-18 DIAGNOSIS — R10.9 FLANK PAIN: ICD-10-CM

## 2018-05-18 PROCEDURE — 76770 US EXAM ABDO BACK WALL COMP: CPT

## 2018-05-18 NOTE — PROGRESS NOTES
SUBJECTIVE:   Yvette Garcia is a 37 year old female who presents to clinic today for the following health issues:    Patient reports that symptoms are unchanged since last visit. Patient had a renal US done on 5/18/18.    Follow up::: Flank pain last OV 5/15/2018      Duration: 1.5 months    Description (location/character/radiation): right flank pain    Intensity:  Mild, 1-2/10    Accompanying signs and symptoms: mostly dull pain that is consistant    History (similar episodes/previous evaluation): Seen on 5/15/2018 where blood work was done and saw chiropractor x2, US on 5/18    Precipitating or alleviating factors: Sitting on a hard surface and raising to stand up causes shooting pains    Therapies tried and outcome: IBU (helps), Thai, heat     Abstract:  August 2015 - negative pap smear        Problem list and histories reviewed & adjusted, as indicated.  Additional history: as documented    Patient Active Problem List   Diagnosis     NO ACTIVE PROBLEMS     Indication for care in labor or delivery     Liveborn infant     Labor, precipitous, delivered     Past Surgical History:   Procedure Laterality Date     C ORAL SURGERY PROCEDURE  1998     CENTRAL LINE      with hemorrhage after delivery of baby     LAPAROSCOPY DIAGNOSTIC (GYN) Left 8/27/2017    Procedure: LAPAROSCOPY DIAGNOSTIC (GYN);  DIAGNOSTIC LAPAROSCOPY, LEFT SALPINGECTOMY;  Surgeon: Yvette Tong MD;  Location:  OR     wisdom teeth  2003       Social History   Substance Use Topics     Smoking status: Never Smoker     Smokeless tobacco: Never Used     Alcohol use 0.0 oz/week     0 Standard drinks or equivalent per week     Family History   Problem Relation Age of Onset     Breast Cancer Maternal Aunt      post-menopausal     Breast Cancer Paternal Grandmother      post-menopausal     Hyperlipidemia Mother      Hyperlipidemia Father      Myocardial Infarction Paternal Grandfather      60's         Current Outpatient Prescriptions  "  Medication Sig Dispense Refill     acetaminophen (TYLENOL) 325 MG tablet Take 2 tablets (650 mg) by mouth every 4 hours as needed for other (mild pain) 100 tablet 0     desogestrel-ethinyl estradiol (KARIVA) 0.15-0.02/0.01 MG (21/5) per tablet Take 1 tablet by mouth daily       ibuprofen (ADVIL,MOTRIN) 400 MG tablet Take 1 tablet (400 mg) by mouth every 6 hours as needed for other (cramping) 30 tablet      albuterol (PROAIR HFA, PROVENTIL HFA, VENTOLIN HFA) 108 (90 BASE) MCG/ACT inhaler Inhale 2 puffs into the lungs every 6 hours       Allergies   Allergen Reactions     Seasonal Allergies        Reviewed and updated as needed this visit by clinical staff       Reviewed and updated as needed this visit by Provider         ROS:  All other systems on a 10-point review are negative, unless otherwise noted in HPI      OBJECTIVE:     BP 92/64 (BP Location: Right arm, Patient Position: Chair, Cuff Size: Adult Regular)  Pulse 98  Temp 98.4  F (36.9  C) (Oral)  Ht 5' 6\" (1.676 m)  Wt 131 lb 9.6 oz (59.7 kg)  SpO2 99%  BMI 21.24 kg/m2  Body mass index is 21.24 kg/(m^2).  GENERAL APPEARANCE: healthy, alert and no distress  ORTHO: Lumber/Thoracic Spine Exam: Tender:  Right flank pain over the posterior inferior rib cage  Range of Motion:  left lateral thoracic bending   full, right lateral thoracic bending  full, left thoracic rotation  full, right thoracic rotation  full  Special tests:  negative facet compression test, negative SI joint compression        Diagnostic Test Results:  UA, CMP, CBC, GGT and lipase, HCG, renal ultrasound normal    ASSESSMENT/PLAN:         1. Strain of muscle, fascia and tendon of lower back, subsequent encounter  Work up for serious etiologies (mass, stone, cyst, gall bladder) was very reassuring - all normal.  This is most likely MSK - muscle spasm, due to lifting child on the other hip frequently.  Will observe.  See PI for home care instructions.      Patient Instructions   Treat pain " like musculoskeletal pain (muscle spasm)    -- Stretch  -- Heating packs with or w/o ice  -- Ibuprofen 600-800 mg three times daily for 3-5 days (take with food)  -- Massage therapy    F/u with me in 1-2 months.    Maryuri Jonas MD  [facility]3]

## 2018-05-21 ENCOUNTER — HEALTH MAINTENANCE LETTER (OUTPATIENT)
Age: 38
End: 2018-05-21

## 2018-05-22 ENCOUNTER — OFFICE VISIT (OUTPATIENT)
Dept: PEDIATRICS | Facility: CLINIC | Age: 38
End: 2018-05-22
Payer: COMMERCIAL

## 2018-05-22 VITALS
OXYGEN SATURATION: 99 % | HEART RATE: 98 BPM | TEMPERATURE: 98.4 F | WEIGHT: 131.6 LBS | DIASTOLIC BLOOD PRESSURE: 64 MMHG | HEIGHT: 66 IN | BODY MASS INDEX: 21.15 KG/M2 | SYSTOLIC BLOOD PRESSURE: 92 MMHG

## 2018-05-22 DIAGNOSIS — S39.012D STRAIN OF MUSCLE, FASCIA AND TENDON OF LOWER BACK, SUBSEQUENT ENCOUNTER: Primary | ICD-10-CM

## 2018-05-22 PROCEDURE — 99213 OFFICE O/P EST LOW 20 MIN: CPT | Performed by: INTERNAL MEDICINE

## 2018-05-22 RX ORDER — DESOGESTREL AND ETHINYL ESTRADIOL 21-5 (28)
1 KIT ORAL DAILY
COMMUNITY
End: 2018-08-10

## 2018-05-22 NOTE — MR AVS SNAPSHOT
After Visit Summary   5/22/2018    Yvette Garcia    MRN: [id number]           Patient Information     Date Of Birth          1980        Visit Information        Provider Department      5/22/2018 8:50 AM Salud Jonas Mai, MD [facility]        Care Instructions    Treat pain like musculoskeletal pain (muscle spasm)    -- Stretch  -- Heating packs with or w/o ice  -- Ibuprofen 600-800 mg three times daily for 3-5 days (take with food)  -- Massage therapy          Follow-ups after your visit        Follow-up notes from your care team     Return in about 2 months (around 7/22/2018) for Physical Exam, Fasting Lab Work.      Who to contact     If you have questions or need follow up information about today's clinic visit or your schedule please contact [facility] directly at [phone].  Normal or non-critical lab and imaging results will be communicated to you by [employer], letter or phone within 4 business days after the clinic has received the results. If you do not hear from us within 7 days, please contact the clinic through [employer] or phone. If you have a critical or abnormal lab result, we will notify you by phone as soon as possible.  Submit refill requests through [employer] or call your pharmacy and they will forward the refill request to us. Please allow 3 business days for your refill to be completed.          Additional Information About Your Visit        MyChart Information     [employer] gives you secure access to your electronic health record. If you see a primary care provider, you can also send messages to your care team and make appointments. If you have questions, please call your primary care clinic.  If you do not have a primary care provider, please call [phone] and they will assist you.        Care EveryWhere ID     This is your Care EveryWhere ID. This could be used by other organizations to access your [place] medical records  [id number]       "  Your Vitals Were     Pulse Temperature Height Pulse Oximetry BMI (Body Mass Index)       98 98.4  F (36.9  C) (Oral) 5' 6\" (1.676 m) 99% 21.24 kg/m2        Blood Pressure from Last 3 Encounters:   05/22/18 92/64   05/15/18 106/70   08/27/17 (!) 89/51    Weight from Last 3 Encounters:   05/22/18 131 lb 9.6 oz (59.7 kg)   05/15/18 134 lb 1.6 oz (60.8 kg)   08/27/17 131 lb (59.4 kg)              Today, you had the following     No orders found for display       Primary Care Provider Office Phone # Fax #    Carley Ramos [phone] [address] [place] [address]        Equal Access to Services     [facility]: Hadii aad ku hadasho Sodaphne, waaxda luqadaha, qaybta kaalmada adebrianyayolis, wilian sanderson . So [facility] [phone].    ATENCIÓN: Si habla español, tiene a antonio disposición servicios gratuitos de asistencia lingüística. [facility] [phone].    We comply with applicable federal civil rights laws and [place] laws. We do not discriminate on the basis of race, color, national origin, age, disability, sex, sexual orientation, or gender identity.            Thank you!     Thank you for choosing [facility]  for your care. Our goal is always to provide you with excellent care. Hearing back from our patients is one way we can continue to improve our services. Please take a few minutes to complete the written survey that you may receive in the mail after your visit with us. Thank you!             Your Updated Medication List - Protect others around you: Learn how to safely use, store and throw away your medicines at www.disposemymeds.org.          This list is accurate as of 5/22/18  9:24 AM.  Always use your most recent med list.                   Brand Name Dispense Instructions for use Diagnosis    acetaminophen 325 MG tablet    TYLENOL    100 tablet    Take 2 tablets (650 mg) by mouth every 4 hours as needed for other (mild pain)    " Tubal pregnancy without intrauterine pregnancy, S/P laparoscopic surgery       albuterol 108 (90 Base) MCG/ACT Inhaler    PROAIR HFA/PROVENTIL HFA/VENTOLIN HFA     Inhale 2 puffs into the lungs every 6 hours        desogestrel-ethinyl estradiol 0.15-0.02/0.01 MG (21/5) per tablet    KARIVA     Take 1 tablet by mouth daily        ibuprofen 400 MG tablet    ADVIL/MOTRIN    30 tablet    Take 1 tablet (400 mg) by mouth every 6 hours as needed for other (cramping)    Labor, precipitous, delivered

## 2018-05-22 NOTE — PATIENT INSTRUCTIONS
Treat pain like musculoskeletal pain (muscle spasm)    -- Stretch  -- Heating packs with or w/o ice  -- Ibuprofen 600-800 mg three times daily for 3-5 days (take with food)  -- Massage therapy

## 2018-06-10 ENCOUNTER — MYC MEDICAL ADVICE (OUTPATIENT)
Dept: PEDIATRICS | Facility: CLINIC | Age: 38
End: 2018-06-10

## 2018-06-10 DIAGNOSIS — M54.50 LOW BACK PAIN: Primary | ICD-10-CM

## 2018-06-11 NOTE — TELEPHONE ENCOUNTER
Routing to Dr. Ramos, to advise on imaging or need for Ortho Referral.     Patient was seen on 5/22/18 for back pain. Symptoms continue, now radiating to hip.

## 2018-06-11 NOTE — TELEPHONE ENCOUNTER
Will route to Dr. Jonas who saw patient for this issue for recommendations. I have not seen the patient since 2015    Carley Ramos MD  Internal Medicine/Pediatrics

## 2018-06-11 NOTE — TELEPHONE ENCOUNTER
Responded to pt directly via [facility] offering either PT referral or a phone visit if she prefers.  Thanks.

## 2018-06-12 ENCOUNTER — VIRTUAL VISIT (OUTPATIENT)
Dept: PEDIATRICS | Facility: CLINIC | Age: 38
End: 2018-06-12
Payer: COMMERCIAL

## 2018-06-12 DIAGNOSIS — R10.9 FLANK PAIN: Primary | ICD-10-CM

## 2018-06-12 PROCEDURE — 99213 OFFICE O/P EST LOW 20 MIN: CPT | Performed by: INTERNAL MEDICINE

## 2018-06-12 NOTE — PROGRESS NOTES
SUBJECTIVE:   Yvette Garcia is a 37 year old female who presents to clinic today for the following health issues:      Follow up:  2 month course of indolent flank pain.  Kidney ultrasound negative.  Labs negative.  No associated menstrual changes, no fevers, chills, appetite loss.  Started as a right posterior rib pain described as dull aching pain.  Now has moved down to right hip and is more of a dull pain with occasional shooting pain.  Carries her 2 year old toddler on her side a lot.  Has a busy, stressful life taking care of 3 young children.  Feels fatigue, but this is not new.  Is concerned about an underlying mass.    Problem list and histories reviewed & adjusted, as indicated.  Additional history: as documented    Patient Active Problem List   Diagnosis     NO ACTIVE PROBLEMS     Indication for care in labor or delivery     Liveborn infant     Labor, precipitous, delivered     Past Surgical History:   Procedure Laterality Date     C ORAL SURGERY PROCEDURE  1998     CENTRAL LINE      with hemorrhage after delivery of baby     LAPAROSCOPY DIAGNOSTIC (GYN) Left 8/27/2017    Procedure: LAPAROSCOPY DIAGNOSTIC (GYN);  DIAGNOSTIC LAPAROSCOPY, LEFT SALPINGECTOMY;  Surgeon: Yvette Tong MD;  Location: [facility] OR     wisdom teeth  2003       Social History   Substance Use Topics     Smoking status: Never Smoker     Smokeless tobacco: Never Used     Alcohol use 0.0 oz/week     0 Standard drinks or equivalent per week     Family History   Problem Relation Age of Onset     Breast Cancer Maternal Aunt      post-menopausal     Breast Cancer Paternal Grandmother      post-menopausal     Hyperlipidemia Mother      Hyperlipidemia Father      Myocardial Infarction Paternal Grandfather      60's         Current Outpatient Prescriptions   Medication Sig Dispense Refill     acetaminophen (TYLENOL) 325 MG tablet Take 2 tablets (650 mg) by mouth every 4 hours as needed for other (mild pain) 100 tablet 0      desogestrel-ethinyl estradiol (KARIVA) 0.15-0.02/0.01 MG (21/5) per tablet Take 1 tablet by mouth daily       ibuprofen (ADVIL,MOTRIN) 400 MG tablet Take 1 tablet (400 mg) by mouth every 6 hours as needed for other (cramping) 30 tablet      albuterol (PROAIR HFA, PROVENTIL HFA, VENTOLIN HFA) 108 (90 BASE) MCG/ACT inhaler Inhale 2 puffs into the lungs every 6 hours       Allergies   Allergen Reactions     Seasonal Allergies        Reviewed and updated as needed this visit by clinical staff  Tobacco  Allergies  Meds  Med Hx  Surg Hx  Fam Hx  Soc Hx      Reviewed and updated as needed this visit by Provider         ROS:  All other systems on a 10-point review are negative, unless otherwise noted in HPI      OBJECTIVE:     There were no vitals taken for this visit.  There is no height or weight on file to calculate BMI.  Diagnostic Test Results:  none     ASSESSMENT/PLAN:       1. Flank pain  Reassurance and observation.  Will start with PT to evaluate for alignment issues and any underlying MSK problems.  If no improvement after 2 weeks, will send CT scan.  - [facility] PT, HAND, AND CHIROPRACTIC REFERRAL    Will reconnect in 2 weeks..    Maryuri Jonas MD  [facility]

## 2018-06-19 ENCOUNTER — THERAPY VISIT (OUTPATIENT)
Dept: PHYSICAL THERAPY | Facility: CLINIC | Age: 38
End: 2018-06-19
Payer: COMMERCIAL

## 2018-06-19 DIAGNOSIS — M54.9 BACK PAIN: Primary | ICD-10-CM

## 2018-06-19 PROCEDURE — 97162 PT EVAL MOD COMPLEX 30 MIN: CPT | Mod: GP | Performed by: PHYSICAL THERAPIST

## 2018-06-19 PROCEDURE — 97110 THERAPEUTIC EXERCISES: CPT | Mod: GP | Performed by: PHYSICAL THERAPIST

## 2018-06-19 NOTE — PROGRESS NOTES
"[facility] Initial Evaluation  Subjective:  Patient is a 37 year old female presenting with rehab back hpi. The history is provided by the patient. No  was used.   Yvette Garcia is a 37 year old female with a lumbar (right side) condition.  Condition occurred with:  Insidious onset.  Condition occurred: for unknown reasons.  This is a new condition  Patient reports having right side flank pain beginning about 3 months ago. Pain began in lower ribcage area and is now above iliac crest. Patient denies any radicular symptoms.  Labs and renal ultrasound are normal.  Patient has difficulty identifying any position or activity that provokes pain.  \"It is just always there\".  Patient suspects carrying 3 y/o on left hip possibly contributes to symptoms, but she does not have pain with this.    Site of Pain: Right side/flank.  Radiates to:  No radiation.  Quality: bruise. and is constant and reported as 2/10.  Associated with: none. Pain is the same all the time.  Exacerbated by: none. and relieved by NSAID's.  Since onset symptoms are unchanged.  Special tests:  Other (US - normal).  Previous treatment: none.    General health as reported by patient is good and excellent.  Pertinent medical history includes:  Asthma.  Medical allergies: no.  Other surgeries include:  Other.  Current medications:  Other.  Current occupation is RN.        Barriers include:  None as reported by the patient.    Red flags:  None as reported by the patient.                        Objective:  Standing Alignment:        Lumbar:  Normal  Pelvic:  Normal                         Lumbar/SI Evaluation  ROM:    AROM Lumbar:   Flexion:            WNL - ERP  Ext:                    WNL   Side Bend:        Left:     Right:   Rotation:           Left:  WNL    Right:  WNL  Side Glide:        Left:  WNL - ERP    Right:  WNL      AROM Thoracic:  Flex:               WNL  Ext:                WNL  Rotation:        Left: "  WNL    Right:  WNL     Strength: Fair core strength  Lumbar Myotomes:  not assessed                    Lumbar Palpation:  not assessed                                                         General     ROS    Assessment/Plan:    Patient is a 37 year old female with thoracic and lumbar complaints.    Patient has the following significant findings with corresponding treatment plan.                Diagnosis 1:  Lumbar derangement  Pain -  self management, education, directional preference exercise and home program  Decreased ROM/flexibility - therapeutic exercise, therapeutic activity and home program  Decreased strength - therapeutic exercise, therapeutic activities and home program  Decreased function - therapeutic activities and home program    Therapy Evaluation Codes:   1) History comprised of:   Personal factors that impact the plan of care:      None.    Comorbidity factors that impact the plan of care are:      Asthma.     Medications impacting care: None.  2) Examination of Body Systems comprised of:   Body structures and functions that impact the plan of care:      Lumbar spine and Thoracic Spine.   Activity limitations that impact the plan of care are:      Carrying.  3) Clinical presentation characteristics are:   Evolving/Changing.  4) Decision-Making    Moderate complexity using standardized patient assessment instrument and/or measureable assessment of functional outcome.  Cumulative Therapy Evaluation is: Moderate complexity.    Previous and current functional limitations:  (See Goal Flow Sheet for this information)    Short term and Long term goals: (See Goal Flow Sheet for this information)     Communication ability:  Patient appears to be able to clearly communicate and understand verbal and written communication and follow directions correctly.  Treatment Explanation - The following has been discussed with the patient:   RX ordered/plan of care  Anticipated outcomes  Possible risks and side  effects  This patient would benefit from PT intervention to resume normal activities.   Rehab potential is good.    Frequency:  1 X week, once daily  Duration:  for 6 weeks  Discharge Plan:  Achieve all LTG.  Independent in home treatment program.  Reach maximal therapeutic benefit.    Please refer to the daily flowsheet for treatment today, total treatment time and time spent performing 1:1 timed codes.

## 2018-06-22 ENCOUNTER — MYC MEDICAL ADVICE (OUTPATIENT)
Dept: PEDIATRICS | Facility: CLINIC | Age: 38
End: 2018-06-22

## 2018-06-26 NOTE — TELEPHONE ENCOUNTER
Patient responded-patient asking if she may wait another 2 weeks before she would do a CT due to the flank pain.  Started physical therapy and wants to continue for 2 weeks to see if helping, or not.  Trinidad Anderson RN  Message handled by Nurse Triage.

## 2018-06-28 ENCOUNTER — THERAPY VISIT (OUTPATIENT)
Dept: PHYSICAL THERAPY | Facility: CLINIC | Age: 38
End: 2018-06-28
Payer: COMMERCIAL

## 2018-06-28 DIAGNOSIS — M54.9 BACK PAIN: ICD-10-CM

## 2018-06-28 PROCEDURE — 97110 THERAPEUTIC EXERCISES: CPT | Mod: GP | Performed by: PHYSICAL THERAPIST

## 2018-07-05 ENCOUNTER — THERAPY VISIT (OUTPATIENT)
Dept: PHYSICAL THERAPY | Facility: CLINIC | Age: 38
End: 2018-07-05
Payer: COMMERCIAL

## 2018-07-05 DIAGNOSIS — M54.5 ACUTE RIGHT-SIDED LOW BACK PAIN, WITH SCIATICA PRESENCE UNSPECIFIED: ICD-10-CM

## 2018-07-05 PROCEDURE — 97140 MANUAL THERAPY 1/> REGIONS: CPT | Mod: GP | Performed by: PHYSICAL THERAPIST

## 2018-07-05 PROCEDURE — 97110 THERAPEUTIC EXERCISES: CPT | Mod: GP | Performed by: PHYSICAL THERAPIST

## 2018-07-05 PROCEDURE — 97530 THERAPEUTIC ACTIVITIES: CPT | Mod: GP | Performed by: PHYSICAL THERAPIST

## 2018-07-09 ENCOUNTER — MYC MEDICAL ADVICE (OUTPATIENT)
Dept: PHYSICAL THERAPY | Facility: CLINIC | Age: 38
End: 2018-07-09

## 2018-07-19 ENCOUNTER — THERAPY VISIT (OUTPATIENT)
Dept: PHYSICAL THERAPY | Facility: CLINIC | Age: 38
End: 2018-07-19
Payer: COMMERCIAL

## 2018-07-19 DIAGNOSIS — M54.5 ACUTE RIGHT-SIDED LOW BACK PAIN, WITH SCIATICA PRESENCE UNSPECIFIED: ICD-10-CM

## 2018-07-19 PROCEDURE — 97112 NEUROMUSCULAR REEDUCATION: CPT | Mod: GP | Performed by: PHYSICAL THERAPIST

## 2018-07-19 PROCEDURE — 97140 MANUAL THERAPY 1/> REGIONS: CPT | Mod: GP | Performed by: PHYSICAL THERAPIST

## 2018-07-19 PROCEDURE — 97110 THERAPEUTIC EXERCISES: CPT | Mod: GP | Performed by: PHYSICAL THERAPIST

## 2018-07-26 ENCOUNTER — THERAPY VISIT (OUTPATIENT)
Dept: PHYSICAL THERAPY | Facility: CLINIC | Age: 38
End: 2018-07-26
Payer: COMMERCIAL

## 2018-07-26 DIAGNOSIS — M54.5 ACUTE RIGHT-SIDED LOW BACK PAIN, WITH SCIATICA PRESENCE UNSPECIFIED: ICD-10-CM

## 2018-07-26 PROCEDURE — 97112 NEUROMUSCULAR REEDUCATION: CPT | Mod: GP | Performed by: PHYSICAL THERAPIST

## 2018-07-26 PROCEDURE — 97110 THERAPEUTIC EXERCISES: CPT | Mod: GP | Performed by: PHYSICAL THERAPIST

## 2018-08-07 NOTE — PATIENT INSTRUCTIONS
INSTRUCTIONS:  1. For fatigue and mood - start exercise regimen.  Self-care and boundaries.   - f/u with me in 2-3 months if no improvement or worsening    2. For IUD - can call clinic and ask for appointment or can wait till next pap smear is due.    Preventive Health Recommendations  Female Ages 26 - 39  Yearly exam:   See your health care provider every year in order to    Review health changes.     Discuss preventive care.      Review your medicines if you your doctor has prescribed any.    Until age 30: Get a Pap test every three years (more often if you have had an abnormal result).    After age 30: Talk to your doctor about whether you should have a Pap test every 3 years or have a Pap test with HPV screening every 5 years.   You do not need a Pap test if your uterus was removed (hysterectomy) and you have not had cancer.  You should be tested each year for STDs (sexually transmitted diseases), if you're at risk.   Talk to your provider about how often to have your cholesterol checked.  If you are at risk for diabetes, you should have a diabetes test (fasting glucose).  Shots: Get a flu shot each year. Get a tetanus shot every 10 years.   Nutrition:     Eat at least 5 servings of fruits and vegetables each day.    Eat whole-grain bread, whole-wheat pasta and brown rice instead of white grains and rice.    Get adequate Calcium and Vitamin D.     Lifestyle    Exercise at least 150 minutes a week (30 minutes a day, 5 days of the week). This will help you control your weight and prevent disease.    Limit alcohol to one drink per day.    No smoking.     Wear sunscreen to prevent skin cancer.    See your dentist every six months for an exam and cleaning.

## 2018-08-07 NOTE — PROGRESS NOTES
SUBJECTIVE:   CC: Yvette Garcia is an 37 year old woman who presents for preventive health visit.     Physical   Annual:     Getting at least 3 servings of Calcium per day:  Yes    Bi-annual eye exam:  Yes    Dental care twice a year:  Yes    Sleep apnea or symptoms of sleep apnea:  Daytime drowsiness    Diet:  Regular (no restrictions)    Frequency of exercise:  2-3 days/week    Duration of exercise:  15-30 minutes    Taking medications regularly:  Yes    Medication side effects:  None    Additional concerns today:  YES (feeling like in a bad mood alot)      Today's PHQ-2 Score:   PHQ-2 ( 1999 [employer]) 8/10/2018   Q1: Little interest or pleasure in doing things 0   Q2: Feeling down, depressed or hopeless 1   PHQ-2 Score 1   Q1: Little interest or pleasure in doing things Not at all   Q2: Feeling down, depressed or hopeless Several days   PHQ-2 Score 1     Establish Care/Chart Review:    Health Status:  Satisfied    Goals of Care:  -- Would like to improve physical fitness  -- Prevent pain  -- Improve mood and overall health    PMH:    Mild intermittent asthma        Medications:  OCP    Health Maintenance:    Cancer screening        Immunizations:    Disease Screening/Prevention        Social hx:  Education/Employment status:    - [employer]  Living: live in [place] with  and 3 children          Abuse: Current or Past(Physical, Sexual or Emotional)- No  Do you feel safe in your environment - Yes    Social History   Substance Use Topics     Smoking status: Never Smoker     Smokeless tobacco: Never Used     Alcohol use 0.0 oz/week     0 Standard drinks or equivalent per week     Alcohol Use 8/10/2018   If you drink alcohol do you typically have greater than 3 drinks per day OR greater than 7 drinks per week? No   No flowsheet data found.    Reviewed orders with patient.  Reviewed health maintenance and updated orders accordingly - Yes    Mammogram not appropriate for this patient based  "on age.    Pertinent mammograms are reviewed under the imaging tab.  History of abnormal Pap smear: NO - age 30- 65 PAP every 3 years recommended     Reviewed and updated as needed this visit by clinical staff  Tobacco  Allergies  Meds  Med Hx  Surg Hx  Fam Hx  Soc Hx        Reviewed and updated as needed this visit by Provider            Review of Systems   Constitutional: Negative for chills and fever.   HENT: Negative for congestion, ear pain, hearing loss and sore throat.    Eyes: Negative for pain and visual disturbance.   Respiratory: Negative for cough and shortness of breath.    Cardiovascular: Negative for chest pain, palpitations and peripheral edema.   Gastrointestinal: Negative for abdominal pain, constipation, diarrhea, heartburn, hematochezia and nausea.   Breasts:  Negative for tenderness, breast mass and discharge.   Genitourinary: Negative for dysuria, frequency, genital sores, hematuria, pelvic pain, urgency, vaginal bleeding and vaginal discharge.   Musculoskeletal: Negative for arthralgias, joint swelling and myalgias.   Skin: Negative for rash.   Neurological: Negative for dizziness, weakness, headaches and paresthesias.   Psychiatric/Behavioral: Positive for mood changes. The patient is not nervous/anxious.         OBJECTIVE:   /70 (BP Location: Right arm, Patient Position: Chair)  Pulse 106  Temp 98.5  F (36.9  C) (Oral)  Ht 5' 6\" (1.676 m)  Wt 135 lb 12.8 oz (61.6 kg)  LMP 07/16/2018 (Exact Date)  SpO2 98%  BMI 21.92 kg/m2  Physical Exam  GENERAL: healthy, alert and no distress  EYES: Eyes grossly normal to inspection, PERRL and conjunctivae and sclerae normal  HENT: ear canals and TM's normal, nose and mouth without ulcers or lesions  NECK: no adenopathy, no asymmetry, masses, or scars and thyroid normal to palpation  RESP: lungs clear to auscultation - no rales, rhonchi or wheezes  CV: regular rate and rhythm, normal S1 S2, no S3 or S4, no murmur, click or rub, no " "peripheral edema and peripheral pulses strong  ABDOMEN: soft, nontender, no hepatosplenomegaly, no masses and bowel sounds normal  MS: no gross musculoskeletal defects noted, no edema  SKIN: no suspicious lesions or rashes  NEURO: Normal strength and tone, mentation intact and speech normal  PSYCH: mentation appears normal, affect normal/bright    Diagnostic Test Results:  none     ASSESSMENT/PLAN:   1. Routine general medical examination at a health care facility  Preventive health addressed.  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    PHQ-9 SCORE 8/10/2018   Total Score 4         2. Encounter for surveillance of contraceptive pills  Stable, no issues  - desogestrel-ethinyl estradiol (KARIVA) 0.15-0.02/0.01 MG (21/5) per tablet; Take 1 tablet by mouth daily  Dispense: 28 tablet; Refill: 11    3. Acute right-sided low back pain, with sciatica presence unspecified  Stable, improving with PT.      COUNSELING:  Reviewed preventive health counseling, as reflected in patient instructions    BP Readings from Last 1 Encounters:   08/10/18 102/70     Estimated body mass index is 21.92 kg/(m^2) as calculated from the following:    Height as of this encounter: 5' 6\" (1.676 m).    Weight as of this encounter: 135 lb 12.8 oz (61.6 kg).           reports that she has never smoked. She has never used smokeless tobacco.      Counseling Resources:  ATP IV Guidelines  Pooled Cohorts Equation Calculator  Breast Cancer Risk Calculator  FRAX Risk Assessment  ICSI Preventive Guidelines  Dietary Guidelines for Americans, 2010  [employer]'s MyPlate  ASA Prophylaxis  Lung CA Screening    Maryuri Jonas MD  [facility]  Answers for HPI/ROS submitted by the patient on 8/10/2018 "   PHQ-2 Score: 1

## 2018-08-10 ENCOUNTER — OFFICE VISIT (OUTPATIENT)
Dept: PEDIATRICS | Facility: CLINIC | Age: 38
End: 2018-08-10
Payer: COMMERCIAL

## 2018-08-10 VITALS
WEIGHT: 135.8 LBS | DIASTOLIC BLOOD PRESSURE: 70 MMHG | HEART RATE: 106 BPM | BODY MASS INDEX: 21.83 KG/M2 | HEIGHT: 66 IN | TEMPERATURE: 98.5 F | OXYGEN SATURATION: 98 % | SYSTOLIC BLOOD PRESSURE: 102 MMHG

## 2018-08-10 DIAGNOSIS — Z30.41 ENCOUNTER FOR SURVEILLANCE OF CONTRACEPTIVE PILLS: ICD-10-CM

## 2018-08-10 DIAGNOSIS — Z00.00 ROUTINE GENERAL MEDICAL EXAMINATION AT A HEALTH CARE FACILITY: Primary | ICD-10-CM

## 2018-08-10 DIAGNOSIS — M54.5 ACUTE RIGHT-SIDED LOW BACK PAIN, WITH SCIATICA PRESENCE UNSPECIFIED: ICD-10-CM

## 2018-08-10 PROCEDURE — 99395 PREV VISIT EST AGE 18-39: CPT | Performed by: INTERNAL MEDICINE

## 2018-08-10 RX ORDER — DESOGESTREL AND ETHINYL ESTRADIOL 21-5 (28)
1 KIT ORAL DAILY
Qty: 28 TABLET | Refills: 11 | Status: SHIPPED | OUTPATIENT
Start: 2018-08-10 | End: 2019-10-24

## 2018-08-10 ASSESSMENT — ENCOUNTER SYMPTOMS
MYALGIAS: 0
NERVOUS/ANXIOUS: 0
DIARRHEA: 0
FEVER: 0
JOINT SWELLING: 0
EYE PAIN: 0
ARTHRALGIAS: 0
CONSTIPATION: 0
FREQUENCY: 0
HEADACHES: 0
WEAKNESS: 0
ABDOMINAL PAIN: 0
SORE THROAT: 0
BREAST MASS: 0
PALPITATIONS: 0
PARESTHESIAS: 0
NAUSEA: 0
HEARTBURN: 0
HEMATOCHEZIA: 0
SHORTNESS OF BREATH: 0
CHILLS: 0
HEMATURIA: 0
DIZZINESS: 0
COUGH: 0
DYSURIA: 0

## 2018-08-10 ASSESSMENT — ANXIETY QUESTIONNAIRES
2. NOT BEING ABLE TO STOP OR CONTROL WORRYING: NOT AT ALL
6. BECOMING EASILY ANNOYED OR IRRITABLE: SEVERAL DAYS
7. FEELING AFRAID AS IF SOMETHING AWFUL MIGHT HAPPEN: NOT AT ALL
5. BEING SO RESTLESS THAT IT IS HARD TO SIT STILL: NOT AT ALL
GAD7 TOTAL SCORE: 2
3. WORRYING TOO MUCH ABOUT DIFFERENT THINGS: NOT AT ALL
1. FEELING NERVOUS, ANXIOUS, OR ON EDGE: SEVERAL DAYS
IF YOU CHECKED OFF ANY PROBLEMS ON THIS QUESTIONNAIRE, HOW DIFFICULT HAVE THESE PROBLEMS MADE IT FOR YOU TO DO YOUR WORK, TAKE CARE OF THINGS AT HOME, OR GET ALONG WITH OTHER PEOPLE: SOMEWHAT DIFFICULT

## 2018-08-10 ASSESSMENT — PATIENT HEALTH QUESTIONNAIRE - PHQ9: 5. POOR APPETITE OR OVEREATING: NOT AT ALL

## 2018-08-10 NOTE — MR AVS SNAPSHOT
After Visit Summary   8/10/2018    Yevtte Garcia    MRN: [id number]           Patient Information     Date Of Birth          1980        Visit Information        Provider Department      8/10/2018 1:50 PM Salud Jonas Mai, MD [facility]        Today's Diagnoses     Routine general medical examination at a health care facility    -  1      Care Instructions    INSTRUCTIONS:  1. For fatigue and mood - start exercise regimen.  Self-care and boundaries.   - f/u with me in 2-3 months if no improvement or worsening    2. For IUD - can call clinic and ask for appointment or can wait till next pap smear is due.    Preventive Health Recommendations  Female Ages 26 - 39  Yearly exam:   See your health care provider every year in order to    Review health changes.     Discuss preventive care.      Review your medicines if you your doctor has prescribed any.    Until age 30: Get a Pap test every three years (more often if you have had an abnormal result).    After age 30: Talk to your doctor about whether you should have a Pap test every 3 years or have a Pap test with HPV screening every 5 years.   You do not need a Pap test if your uterus was removed (hysterectomy) and you have not had cancer.  You should be tested each year for STDs (sexually transmitted diseases), if you're at risk.   Talk to your provider about how often to have your cholesterol checked.  If you are at risk for diabetes, you should have a diabetes test (fasting glucose).  Shots: Get a flu shot each year. Get a tetanus shot every 10 years.   Nutrition:     Eat at least 5 servings of fruits and vegetables each day.    Eat whole-grain bread, whole-wheat pasta and brown rice instead of white grains and rice.    Get adequate Calcium and Vitamin D.     Lifestyle    Exercise at least 150 minutes a week (30 minutes a day, 5 days of the week). This will help you control your weight and prevent disease.    Limit alcohol to one  "drink per day.    No smoking.     Wear sunscreen to prevent skin cancer.    See your dentist every six months for an exam and cleaning.            Follow-ups after your visit        Who to contact     If you have questions or need follow up information about today's clinic visit or your schedule please contact [facility] directly at [phone].  Normal or non-critical lab and imaging results will be communicated to you by Kickfirehart, letter or phone within 4 business days after the clinic has received the results. If you do not hear from us within 7 days, please contact the clinic through Kickfirehart or phone. If you have a critical or abnormal lab result, we will notify you by phone as soon as possible.  Submit refill requests through uConnect or call your pharmacy and they will forward the refill request to us. Please allow 3 business days for your refill to be completed.          Additional Information About Your Visit        Kickfirehart Information     uConnect gives you secure access to your electronic health record. If you see a primary care provider, you can also send messages to your care team and make appointments. If you have questions, please call your primary care clinic.  If you do not have a primary care provider, please call [phone] and they will assist you.        Care EveryWhere ID     This is your Care EveryWhere ID. This could be used by other organizations to access your [facility] medical records  [id number]        Your Vitals Were     Pulse Temperature Height Last Period Pulse Oximetry BMI (Body Mass Index)    106 98.5  F (36.9  C) (Oral) 5' 6\" (1.676 m) 07/16/2018 (Exact Date) 98% 21.92 kg/m2       Blood Pressure from Last 3 Encounters:   08/10/18 102/70   05/22/18 92/64   05/15/18 106/70    Weight from Last 3 Encounters:   08/10/18 135 lb 12.8 oz (61.6 kg)   05/22/18 131 lb 9.6 oz (59.7 kg)   05/15/18 134 lb 1.6 oz (60.8 kg)              Today, you had the following     No orders found " for display       Primary Care Provider Office Phone # Fax #    Carley Ramos [phone] [address] [place] [address]        Equal Access to Services     RADHARJ MEREDITH : Tahir adriana lynch lavell Rizo, waóscarda luqadaha, qaybta kawillamda ira, wilian koroma labarbraasia busch. So [facility] [phone].    ATENCIÓN: Si habla español, tiene a antonio disposición servicios gratuitos de asistencia lingüística. Llame al [phone].    We comply with applicable federal civil rights laws and [place] laws. We do not discriminate on the basis of race, color, national origin, age, disability, sex, sexual orientation, or gender identity.            Thank you!     Thank you for choosing [facility]  for your care. Our goal is always to provide you with excellent care. Hearing back from our patients is one way we can continue to improve our services. Please take a few minutes to complete the written survey that you may receive in the mail after your visit with us. Thank you!             Your Updated Medication List - Protect others around you: Learn how to safely use, store and throw away your medicines at www.disposemymeds.org.          This list is accurate as of 8/10/18  2:37 PM.  Always use your most recent med list.                   Brand Name Dispense Instructions for use Diagnosis    acetaminophen 325 MG tablet    TYLENOL    100 tablet    Take 2 tablets (650 mg) by mouth every 4 hours as needed for other (mild pain)    Tubal pregnancy without intrauterine pregnancy, S/P laparoscopic surgery       albuterol 108 (90 Base) MCG/ACT inhaler    PROAIR HFA/PROVENTIL HFA/VENTOLIN HFA     Inhale 2 puffs into the lungs every 6 hours        desogestrel-ethinyl estradiol 0.15-0.02/0.01 MG (21/5) per tablet    KARIVA     Take 1 tablet by mouth daily        ibuprofen 400 MG tablet    ADVIL/MOTRIN    30 tablet    Take 1 tablet (400 mg) by mouth every 6 hours as needed for  other (cramping)    Labor, precipitous, delivered

## 2018-08-11 ASSESSMENT — ANXIETY QUESTIONNAIRES: GAD7 TOTAL SCORE: 2

## 2018-08-11 ASSESSMENT — PATIENT HEALTH QUESTIONNAIRE - PHQ9: SUM OF ALL RESPONSES TO PHQ QUESTIONS 1-9: 4

## 2019-01-14 PROBLEM — M54.9 BACK PAIN: Status: RESOLVED | Noted: 2018-06-19 | Resolved: 2019-01-14

## 2019-03-30 NOTE — PROGRESS NOTES
Discharge Note    Progress reporting period is from initial eval to Jul 26, 2018.     Yvette failed to return for next follow up visit and current status is unknown.  Please see information below for last relevant information on current status.  Patient seen for 5 visits.  SUBJECTIVE  Subjective changes noted by patient:  Pt reports increased soreness folllowing last visit but has been better ever since. She has intermittent pain with certain movments but for the most part pain only up to 1/10. Pt worked overnight and is just waking for the day.   .  Current pain level is  .     Previous pain level was   .   Changes in function:  Yes (See Goal flowsheet attached for changes in current functional level)  Adverse reaction to treatment or activity: None    OBJECTIVE  Changes noted in objective findings: Lumbar AROM slight pain with BB and right SG at end ranges ROM pain free following 1 set of press ups.      ASSESSMENT/PLAN  Diagnosis: Back/Side Pain   DIAGP:  The encounter diagnosis was Acute right-sided low back pain, with sciatica presence unspecified.  Updated problem list and treatment plan:   Pain - HEP  Decreased ROM/flexibility - HEP  Decreased strength - HEP  STG/LTGs have been met or progress has been made towards goals:  Yes, please see goal flowsheet for most current information  Assessment of Progress: current status is unknown.    Last current status: Pt is progressing as expected   Self Management Plans:  HEP  I have re-evaluated this patient and find that the nature, scope, duration and intensity of the therapy is appropriate for the medical condition of the patient.  Yvette continues to require the following intervention to meet STG and LTG's:  HEP.    Recommendations:  Discharge with current home program.  Patient to follow up with MD as needed.    Please refer to the daily flowsheet for treatment today, total treatment time and time spent performing 1:1 timed codes.      
 used

## 2019-09-11 ENCOUNTER — OFFICE VISIT (OUTPATIENT)
Dept: INTERNAL MEDICINE | Facility: CLINIC | Age: 39
End: 2019-09-11
Payer: COMMERCIAL

## 2019-09-11 VITALS
WEIGHT: 137.7 LBS | HEART RATE: 134 BPM | HEIGHT: 66 IN | OXYGEN SATURATION: 98 % | BODY MASS INDEX: 22.13 KG/M2 | SYSTOLIC BLOOD PRESSURE: 114 MMHG | DIASTOLIC BLOOD PRESSURE: 74 MMHG | TEMPERATURE: 99.5 F | RESPIRATION RATE: 15 BRPM

## 2019-09-11 DIAGNOSIS — F41.1 GAD (GENERALIZED ANXIETY DISORDER): ICD-10-CM

## 2019-09-11 DIAGNOSIS — R00.2 PALPITATIONS: ICD-10-CM

## 2019-09-11 DIAGNOSIS — M94.0 COSTOCHONDRITIS: ICD-10-CM

## 2019-09-11 DIAGNOSIS — F32.1 CURRENT MODERATE EPISODE OF MAJOR DEPRESSIVE DISORDER WITHOUT PRIOR EPISODE (H): Primary | ICD-10-CM

## 2019-09-11 PROCEDURE — 93000 ELECTROCARDIOGRAM COMPLETE: CPT | Performed by: PHYSICIAN ASSISTANT

## 2019-09-11 PROCEDURE — 99214 OFFICE O/P EST MOD 30 MIN: CPT | Performed by: PHYSICIAN ASSISTANT

## 2019-09-11 RX ORDER — CITALOPRAM HYDROBROMIDE 10 MG/1
10 TABLET ORAL DAILY
Qty: 30 TABLET | Refills: 2 | Status: SHIPPED | OUTPATIENT
Start: 2019-09-11 | End: 2019-10-24

## 2019-09-11 ASSESSMENT — ANXIETY QUESTIONNAIRES
6. BECOMING EASILY ANNOYED OR IRRITABLE: NEARLY EVERY DAY
5. BEING SO RESTLESS THAT IT IS HARD TO SIT STILL: NOT AT ALL
IF YOU CHECKED OFF ANY PROBLEMS ON THIS QUESTIONNAIRE, HOW DIFFICULT HAVE THESE PROBLEMS MADE IT FOR YOU TO DO YOUR WORK, TAKE CARE OF THINGS AT HOME, OR GET ALONG WITH OTHER PEOPLE: VERY DIFFICULT
GAD7 TOTAL SCORE: 8
2. NOT BEING ABLE TO STOP OR CONTROL WORRYING: NOT AT ALL
7. FEELING AFRAID AS IF SOMETHING AWFUL MIGHT HAPPEN: SEVERAL DAYS
1. FEELING NERVOUS, ANXIOUS, OR ON EDGE: MORE THAN HALF THE DAYS
3. WORRYING TOO MUCH ABOUT DIFFERENT THINGS: MORE THAN HALF THE DAYS

## 2019-09-11 ASSESSMENT — PATIENT HEALTH QUESTIONNAIRE - PHQ9
5. POOR APPETITE OR OVEREATING: NOT AT ALL
SUM OF ALL RESPONSES TO PHQ QUESTIONS 1-9: 13

## 2019-09-11 ASSESSMENT — MIFFLIN-ST. JEOR: SCORE: 1331.35

## 2019-09-11 NOTE — PROGRESS NOTES
"Subjective     Yvette Garcia is a 38 year old female who presents to clinic today for the following health issues:    HPI   Abnormal Mood Symptoms      Duration: Patient states she has always issues with seasonal affective disorder, usually gets better in the summers but has not this yr     Description:  Depression: YES  Anxiety: YES  Panic attacks: no     Accompanying signs and symptoms: see PHQ-9 and WILFREDO scores    History (similar episodes/previous evaluation): None    Precipitating or alleviating factors: None    Therapies tried and outcome: Exercise     Chest Pain      Onset: 2 months     Description (location/character/radiation/duration): Right side of chest/ dull ache/ no radiation/ occurs mostly while laying in bed     Intensity:  mild    Accompanying signs and symptoms: Patient feeling sensation with certain upper body movements of certain positions in bed     Comes and goes    Bothers the most when she is sleeping        Shortness of breath: no        Sweating: no        Nausea/vomitting: no        Palpitations: no        Other (fevers/chills/cough/heartburn/lightheadedness): no     History (similar episodes/previous evaluation): None    Precipitating or alleviating factors:       Worse with exertion: no        Worse with breathing: no        Related to eating: no        Better with burping: no     Therapies tried and outcome: None     Reviewed and updated as needed this visit by Provider  Meds  Problems             Objective    /74   Pulse 134   Temp 99.5  F (37.5  C) (Oral)   Resp 15   Ht 1.676 m (5' 6\")   Wt 62.5 kg (137 lb 11.2 oz)   LMP  (LMP Unknown)   SpO2 98%   Breastfeeding? No   BMI 22.23 kg/m    Body mass index is 22.23 kg/m .  Physical Exam   GENERAL: healthy, alert and no distress  RESP: lungs clear to auscultation - no rales, rhonchi or wheezes  CV: regular rates and rhythm, normal S1 S2, no S3 or S4 and no murmur, click or rub  MS: tenderness to costochondral junction " replicating chest pain   PSYCH: mentation appears normal, affect flat, anxious, judgement and insight intact and appearance well groomed    Diagnostic Test Results:  Labs reviewed in Epic  EKG - unremarkable did notes RSR in V1         Assessment & Plan     1. Current moderate episode of major depressive disorder without prior episode (H)  2. WILFREDO (generalized anxiety disorder)  - reviewed treatment with use, side effects and risks   - citalopram (CELEXA) 10 MG tablet; Take 1 tablet (10 mg) by mouth daily  Dispense: 30 tablet; Refill: 2  - recheck at physical in 6 weeks, follow up sooner if concerns     3. Costochondritis  - suspect chest pain is costochondritis  - did order EKG due to palpitations although patient relates these to anxiety   - reviewed at home treatment   - follow up if no improvement     4. Palpitations  - EKG 12-lead complete w/read - Clinics    Return in about 6 weeks (around 10/23/2019) for Physical Exam.    Kimmie Wilkins PA-C  [facility]

## 2019-09-12 ASSESSMENT — ANXIETY QUESTIONNAIRES: GAD7 TOTAL SCORE: 8

## 2019-10-04 DIAGNOSIS — F32.1 CURRENT MODERATE EPISODE OF MAJOR DEPRESSIVE DISORDER WITHOUT PRIOR EPISODE (H): ICD-10-CM

## 2019-10-04 RX ORDER — CITALOPRAM HYDROBROMIDE 10 MG/1
10 TABLET ORAL DAILY
Qty: 30 TABLET | Refills: 2 | Status: CANCELLED | OUTPATIENT
Start: 2019-10-04

## 2019-10-04 NOTE — TELEPHONE ENCOUNTER
"RX for citalopram (CELEXA) 10 MG tablet was ordered by PCP on 9/11/19 for 3 month supply. Should not need refill until: 12/11/19.    Requested Prescriptions   Pending Prescriptions Disp Refills     citalopram (CELEXA) 10 MG tablet 30 tablet 2     Sig: Take 1 tablet (10 mg) by mouth daily       SSRIs Protocol Failed - 10/4/2019  3:59 PM        Failed - PHQ-9 score less than 5 in past 6 months     Please review last PHQ-9 score.           Passed - Medication is active on med list        Passed - Patient is age 18 or older        Passed - No active pregnancy on record        Passed - No positive pregnancy test in last 12 months        Passed - Recent (6 mo) or future (30 days) visit within the authorizing provider's specialty     Patient had office visit in the last 6 months or has a visit in the next 30 days with authorizing provider or within the authorizing provider's specialty.  See \"Patient Info\" tab in inbasket, or \"Choose Columns\" in Meds & Orders section of the refill encounter.              "

## 2019-10-22 ENCOUNTER — PRE VISIT (OUTPATIENT)
Dept: PEDIATRICS | Facility: CLINIC | Age: 39
End: 2019-10-22

## 2019-10-22 NOTE — TELEPHONE ENCOUNTER
Pre-Visit Planning     Future Appointments   Date Time Provider Department Center   10/24/2019  9:20 AM Carley Ramos MD [facility]     Appointment Notes for this encounter:   Data Unavailable    Questionnaires Reviewed/Assigned  No additional questionnaires are needed        Patient preferred phone number: [phone]    Unable to reach. Left voicemail. Advised patient to call clinic back at [phone].

## 2019-10-24 ENCOUNTER — OFFICE VISIT (OUTPATIENT)
Dept: PEDIATRICS | Facility: CLINIC | Age: 39
End: 2019-10-24
Payer: COMMERCIAL

## 2019-10-24 VITALS
DIASTOLIC BLOOD PRESSURE: 70 MMHG | BODY MASS INDEX: 21.92 KG/M2 | SYSTOLIC BLOOD PRESSURE: 106 MMHG | TEMPERATURE: 97.9 F | HEIGHT: 66 IN | HEART RATE: 109 BPM | WEIGHT: 136.4 LBS | OXYGEN SATURATION: 99 %

## 2019-10-24 DIAGNOSIS — Z30.41 ENCOUNTER FOR SURVEILLANCE OF CONTRACEPTIVE PILLS: ICD-10-CM

## 2019-10-24 DIAGNOSIS — Z01.419 WELL WOMAN EXAM WITH ROUTINE GYNECOLOGICAL EXAM: Primary | ICD-10-CM

## 2019-10-24 DIAGNOSIS — F32.1 CURRENT MODERATE EPISODE OF MAJOR DEPRESSIVE DISORDER WITHOUT PRIOR EPISODE (H): ICD-10-CM

## 2019-10-24 DIAGNOSIS — R53.83 FATIGUE, UNSPECIFIED TYPE: ICD-10-CM

## 2019-10-24 DIAGNOSIS — F41.1 GAD (GENERALIZED ANXIETY DISORDER): ICD-10-CM

## 2019-10-24 LAB
HGB BLD-MCNC: 14.2 G/DL (ref 11.7–15.7)
TSH SERPL DL<=0.005 MIU/L-ACNC: 1.27 MU/L (ref 0.4–4)

## 2019-10-24 PROCEDURE — 99395 PREV VISIT EST AGE 18-39: CPT | Performed by: INTERNAL MEDICINE

## 2019-10-24 PROCEDURE — G0145 SCR C/V CYTO,THINLAYER,RESCR: HCPCS | Performed by: INTERNAL MEDICINE

## 2019-10-24 PROCEDURE — 82306 VITAMIN D 25 HYDROXY: CPT | Performed by: INTERNAL MEDICINE

## 2019-10-24 PROCEDURE — 87624 HPV HI-RISK TYP POOLED RSLT: CPT | Performed by: INTERNAL MEDICINE

## 2019-10-24 PROCEDURE — 36415 COLL VENOUS BLD VENIPUNCTURE: CPT | Performed by: INTERNAL MEDICINE

## 2019-10-24 PROCEDURE — 84443 ASSAY THYROID STIM HORMONE: CPT | Performed by: INTERNAL MEDICINE

## 2019-10-24 PROCEDURE — 85018 HEMOGLOBIN: CPT | Performed by: INTERNAL MEDICINE

## 2019-10-24 PROCEDURE — 99214 OFFICE O/P EST MOD 30 MIN: CPT | Mod: 25 | Performed by: INTERNAL MEDICINE

## 2019-10-24 PROCEDURE — 96127 BRIEF EMOTIONAL/BEHAV ASSMT: CPT | Mod: 59 | Performed by: INTERNAL MEDICINE

## 2019-10-24 RX ORDER — DESOGESTREL AND ETHINYL ESTRADIOL 21-5 (28)
1 KIT ORAL DAILY
Qty: 84 TABLET | Refills: 3 | Status: SHIPPED | OUTPATIENT
Start: 2019-10-24 | End: 2020-09-15

## 2019-10-24 RX ORDER — CETIRIZINE HYDROCHLORIDE 10 MG/1
10 TABLET ORAL DAILY PRN
COMMUNITY

## 2019-10-24 RX ORDER — CITALOPRAM HYDROBROMIDE 20 MG/1
20 TABLET ORAL DAILY
Qty: 90 TABLET | Refills: 1 | Status: SHIPPED | OUTPATIENT
Start: 2019-10-24 | End: 2019-11-17

## 2019-10-24 ASSESSMENT — ENCOUNTER SYMPTOMS
SHORTNESS OF BREATH: 0
HEARTBURN: 0
FREQUENCY: 0
ABDOMINAL PAIN: 0
DYSURIA: 0
WEAKNESS: 0
JOINT SWELLING: 0
NAUSEA: 0
SORE THROAT: 0
HEADACHES: 0
DIZZINESS: 0
FEVER: 0
EYE PAIN: 0
BREAST MASS: 0
MYALGIAS: 0
HEMATOCHEZIA: 0
DIARRHEA: 0
COUGH: 0
CHILLS: 0
PARESTHESIAS: 0
PALPITATIONS: 0
HEMATURIA: 0
NERVOUS/ANXIOUS: 1
ARTHRALGIAS: 0
CONSTIPATION: 0

## 2019-10-24 ASSESSMENT — ANXIETY QUESTIONNAIRES
2. NOT BEING ABLE TO STOP OR CONTROL WORRYING: SEVERAL DAYS
3. WORRYING TOO MUCH ABOUT DIFFERENT THINGS: NOT AT ALL
6. BECOMING EASILY ANNOYED OR IRRITABLE: MORE THAN HALF THE DAYS
7. FEELING AFRAID AS IF SOMETHING AWFUL MIGHT HAPPEN: NOT AT ALL
5. BEING SO RESTLESS THAT IT IS HARD TO SIT STILL: NOT AT ALL
GAD7 TOTAL SCORE: 5
1. FEELING NERVOUS, ANXIOUS, OR ON EDGE: MORE THAN HALF THE DAYS
IF YOU CHECKED OFF ANY PROBLEMS ON THIS QUESTIONNAIRE, HOW DIFFICULT HAVE THESE PROBLEMS MADE IT FOR YOU TO DO YOUR WORK, TAKE CARE OF THINGS AT HOME, OR GET ALONG WITH OTHER PEOPLE: SOMEWHAT DIFFICULT

## 2019-10-24 ASSESSMENT — PATIENT HEALTH QUESTIONNAIRE - PHQ9
SUM OF ALL RESPONSES TO PHQ QUESTIONS 1-9: 9
5. POOR APPETITE OR OVEREATING: NOT AT ALL

## 2019-10-24 ASSESSMENT — MIFFLIN-ST. JEOR: SCORE: 1325.46

## 2019-10-24 NOTE — PROGRESS NOTES
SUBJECTIVE:   CC: Yvette Garcia is an 38 year old woman who presents for preventive health visit.     Healthy Habits:     Getting at least 3 servings of Calcium per day:  Yes    Bi-annual eye exam:  NO    Dental care twice a year:  Yes    Sleep apnea or symptoms of sleep apnea:  Daytime drowsiness    Diet:  Regular (no restrictions)    Frequency of exercise:  2-3 days/week    Duration of exercise:  30-45 minutes    Taking medications regularly:  Yes    Medication side effects:  None    PHQ-2 Total Score: 2    Additional concerns today:  No    Depression and Anxiety Follow-Up    How are you doing with your depression since your last visit? Improved     How are you doing with your anxiety since your last visit?  Improved     Are you having other symptoms that might be associated with depression or anxiety? Yes:  palpitations    Have you had a significant life event? No     Do you have any concerns with your use of alcohol or other drugs? No     Started on citalopram 10 mg once a day on 9/11. Has helped, but wondering if needs a higher dose. No side effects. Has had problems with seasonal depression in the past, but this year lingered into the summer months. No particular trigger other than stress at home taking care of family.    Fatigue: is tired all of the time. Doesn't feel rested when wakes up in the am.  says snores. Hasn't noticed stopped breathing. Both parents with history of sleep apnea. Has always required more sleep. Has been worse recently.     Social History     Tobacco Use     Smoking status: Never Smoker     Smokeless tobacco: Never Used   Substance Use Topics     Alcohol use: Yes     Alcohol/week: 0.0 standard drinks     Comment: 1 drink weekly      Drug use: No     PHQ 8/10/2018 9/11/2019 10/24/2019   PHQ-9 Total Score 4 13 9   Q9: Thoughts of better off dead/self-harm past 2 weeks Not at all Not at all Not at all     WILFREDO-7 SCORE 8/10/2018 9/11/2019 10/24/2019   Total Score 2 8 5     No  flowsheet data found.  No flowsheet data found.      Suicide Assessment Five-step Evaluation and Treatment (SAFE-T)    Today's PHQ-2 Score:   PHQ-2 ( 1999 [employer]) 10/24/2019   Q1: Little interest or pleasure in doing things 1   Q2: Feeling down, depressed or hopeless 1   PHQ-2 Score 2   Q1: Little interest or pleasure in doing things Several days   Q2: Feeling down, depressed or hopeless Several days   PHQ-2 Score 2     Abuse: Current or Past(Physical, Sexual or Emotional)- No  Do you feel safe in your environment? Yes    Social History     Tobacco Use     Smoking status: Never Smoker     Smokeless tobacco: Never Used   Substance Use Topics     Alcohol use: Yes     Alcohol/week: 0.0 standard drinks     Comment: 1 drink weekly      If you drink alcohol do you typically have >3 drinks per day or >7 drinks per week? No    Alcohol Use 10/24/2019   Prescreen: >3 drinks/day or >7 drinks/week? No   Prescreen: >3 drinks/day or >7 drinks/week? -   No flowsheet data found.    Reviewed orders with patient.  Reviewed health maintenance and updated orders accordingly - Yes  Patient Active Problem List   Diagnosis     Current moderate episode of major depressive disorder without prior episode (H)     WILFREDO (generalized anxiety disorder)     Past Surgical History:   Procedure Laterality Date     C ORAL SURGERY PROCEDURE  1998     CENTRAL LINE      with hemorrhage after delivery of baby     LAPAROSCOPY DIAGNOSTIC (GYN) Left 8/27/2017    Procedure: LAPAROSCOPY DIAGNOSTIC (GYN);  DIAGNOSTIC LAPAROSCOPY, LEFT SALPINGECTOMY;  Surgeon: Yvette Tong MD;  Location:  OR     wisdom teeth  2003       Social History     Tobacco Use     Smoking status: Never Smoker     Smokeless tobacco: Never Used   Substance Use Topics     Alcohol use: Yes     Alcohol/week: 0.0 standard drinks     Comment: 1 drink weekly      Family History   Problem Relation Age of Onset     Breast Cancer Maternal Aunt         post-menopausal     Breast Cancer  "Paternal Grandmother         post-menopausal     Hyperlipidemia Mother      Asthma Mother      Sleep Apnea Mother      Hyperlipidemia Father      Depression Father      Myocardial Infarction Paternal Grandfather         60's           Mammogram not appropriate for this patient based on age.    Pertinent mammograms are reviewed under the imaging tab.  History of abnormal Pap smear: NO - age 30-65 PAP every 5 years with negative HPV co-testing recommended     Reviewed and updated as needed this visit by clinical staff  Tobacco  Allergies  Meds  Problems  Med Hx  Surg Hx  Fam Hx  Soc Hx        Reviewed and updated as needed this visit by Provider  Tobacco  Allergies  Meds  Problems  Med Hx  Surg Hx  Fam Hx          Review of Systems   Constitutional: Negative for chills and fever.   HENT: Negative for congestion, ear pain, hearing loss and sore throat.    Eyes: Negative for pain and visual disturbance.   Respiratory: Negative for cough and shortness of breath.    Cardiovascular: Negative for chest pain, palpitations and peripheral edema.   Gastrointestinal: Negative for abdominal pain, constipation, diarrhea, heartburn, hematochezia and nausea.   Breasts:  Negative for tenderness, breast mass and discharge.   Genitourinary: Negative for dysuria, frequency, genital sores, hematuria, pelvic pain, urgency, vaginal bleeding and vaginal discharge.   Musculoskeletal: Negative for arthralgias, joint swelling and myalgias.   Skin: Negative for rash.   Neurological: Negative for dizziness, weakness, headaches and paresthesias.   Psychiatric/Behavioral: Positive for mood changes. The patient is nervous/anxious.      OBJECTIVE:   /70 (BP Location: Right arm, Patient Position: Sitting, Cuff Size: Adult Regular)   Pulse 109   Temp 97.9  F (36.6  C) (Tympanic)   Ht 1.676 m (5' 6\")   Wt 61.9 kg (136 lb 6.4 oz)   LMP 10/18/2019 (Exact Date)   SpO2 99%   BMI 22.02 kg/m    Physical Exam  GENERAL: healthy, " alert and no distress  EYES: Eyes grossly normal to inspection, PERRL and conjunctivae and sclerae normal  HENT: ear canals and TM's normal, nose and mouth without ulcers or lesions  NECK: no adenopathy, no asymmetry, masses, or scars and thyroid normal to palpation  RESP: lungs clear to auscultation - no rales, rhonchi or wheezes  BREAST: normal without masses, tenderness or nipple discharge and no palpable axillary masses or adenopathy  CV: regular rate and rhythm, normal S1 S2, no S3 or S4, no murmur, click or rub, no peripheral edema and peripheral pulses strong  ABDOMEN: soft, nontender, no hepatosplenomegaly, no masses and bowel sounds normal   (female): normal female external genitalia, normal urethral meatus, vaginal mucosa pink, moist, well rugated, and normal cervix/adnexa/uterus without masses or discharge  MS: no gross musculoskeletal defects noted, no edema  SKIN: no suspicious lesions or rashes  NEURO: Normal strength and tone, mentation intact and speech normal  PSYCH: mentation appears normal, affect flat    Diagnostic Test Results:  Labs reviewed in Epic  Results for orders placed or performed in visit on 10/24/19   TSH with free T4 reflex   Result Value Ref Range    TSH 1.27 0.40 - 4.00 mU/L   Hemoglobin   Result Value Ref Range    Hemoglobin 14.2 11.7 - 15.7 g/dL       ASSESSMENT/PLAN:   1. Well woman exam with routine gynecological exam  Pap done today. Last done with GYN and unsure of HPV status. Recommend mammo at 40 with family history. tdap and flu vaccines UTD.  - Pap imaged thin layer screen with HPV - recommended age 30 - 65 years (select HPV order below)  - HPV High Risk Types DNA Cervical    2. Encounter for surveillance of contraceptive pills  No side effects. Refilled.  - desogestrel-ethinyl estradiol (KARIVA) 0.15-0.02/0.01 MG (21/5) tablet; Take 1 tablet by mouth daily  Dispense: 84 tablet; Refill: 3    3. Current moderate episode of major depressive disorder without prior episode  "(H)  4. WILFREDO (generalized anxiety disorder)  Not fully controlled, but on low dose of citalopram. Will increase to 20 mg. Check lab work to rule-out confounding causes. Also discussed light box therapy in the winter. Will f/u in about a month with an e-visit or phone visit. She is aware that I am leaving the clinic and will follow-up prior to me leaving.  - citalopram (CELEXA) 20 MG tablet; Take 1 tablet (20 mg) by mouth daily  Dispense: 90 tablet; Refill: 1  - TSH with free T4 reflex  - Hemoglobin  - Vitamin D Deficiency    5. Fatigue, unspecified type  Concerned about sleep apnea with family history and snoring although no apnea noted by  and she is of a normal weight. Will start with labs. If labs normal, will refer for sleep evaluation. Discussed could also be related to depression, but this has been going on much longer than when her depression worsened.  - TSH with free T4 reflex  - Hemoglobin  - Vitamin D Deficiency    COUNSELING:  Reviewed preventive health counseling, as reflected in patient instructions  Special attention given to:        Regular exercise       Healthy diet/nutrition       Contraception       Family planning    Estimated body mass index is 22.02 kg/m  as calculated from the following:    Height as of this encounter: 1.676 m (5' 6\").    Weight as of this encounter: 61.9 kg (136 lb 6.4 oz).         reports that she has never smoked. She has never used smokeless tobacco.      Counseling Resources:  ATP IV Guidelines  Pooled Cohorts Equation Calculator  Breast Cancer Risk Calculator  FRAX Risk Assessment  ICSI Preventive Guidelines  Dietary Guidelines for Americans, 2010  USDA's MyPlate  ASA Prophylaxis  Lung CA Screening    Carley Ramos MD  [facility]  "

## 2019-10-24 NOTE — PATIENT INSTRUCTIONS
Preventive Health Recommendations  Female Ages 26 - 39  Yearly exam:   See your health care provider every year in order to    Review health changes.     Discuss preventive care.      Review your medicines if you your doctor has prescribed any.    Until age 30: Get a Pap test every three years (more often if you have had an abnormal result).    After age 30: Talk to your doctor about whether you should have a Pap test every 3 years or have a Pap test with HPV screening every 5 years.   You do not need a Pap test if your uterus was removed (hysterectomy) and you have not had cancer.  You should be tested each year for STDs (sexually transmitted diseases), if you're at risk.   Talk to your provider about how often to have your cholesterol checked.  If you are at risk for diabetes, you should have a diabetes test (fasting glucose).  Shots: Get a flu shot each year. Get a tetanus shot every 10 years.   Nutrition:     Eat at least 5 servings of fruits and vegetables each day.    Eat whole-grain bread, whole-wheat pasta and brown rice instead of white grains and rice.    Get adequate Calcium and Vitamin D.     Lifestyle    Exercise at least 150 minutes a week (30 minutes a day, 5 days of the week). This will help you control your weight and prevent disease.    Limit alcohol to one drink per day.    No smoking.     Wear sunscreen to prevent skin cancer.    See your dentist every six months for an exam and cleaning.  ---------------  1. Labs today: thyroid, hemoglobin and vitamin D  2. If labs normal, will order sleep apnea evaluation  3. Increase citalopram to 20 mg once a day  4. Pap today with HPV test - if negative, would repeat in 5 years  5. Follow-up in 1 month as e-visit or phone visit    ------------------  I will be changing clinics to the [facility] in [place]. My last day will be 11/27/2019.  You could see Quin Cooley, Quin Alegre or Shaye Palafox after I leave.    It has been my pleasure to  provide your care over the last few years!    Sincerely,  Carley Ramos MD  Internal Medicine/Pediatrics

## 2019-10-25 LAB — DEPRECATED CALCIDIOL+CALCIFEROL SERPL-MC: 27 UG/L (ref 20–75)

## 2019-10-25 ASSESSMENT — ANXIETY QUESTIONNAIRES: GAD7 TOTAL SCORE: 5

## 2019-11-01 LAB
COPATH REPORT: NORMAL
PAP: NORMAL

## 2019-11-04 LAB
FINAL DIAGNOSIS: NORMAL
HPV HR 12 DNA CVX QL NAA+PROBE: NEGATIVE
HPV16 DNA SPEC QL NAA+PROBE: NEGATIVE
HPV18 DNA SPEC QL NAA+PROBE: NEGATIVE
SPECIMEN DESCRIPTION: NORMAL
SPECIMEN SOURCE CVX/VAG CYTO: NORMAL

## 2019-11-16 ENCOUNTER — E-VISIT (OUTPATIENT)
Dept: PEDIATRICS | Facility: CLINIC | Age: 39
End: 2019-11-16
Payer: COMMERCIAL

## 2019-11-16 DIAGNOSIS — F41.1 GAD (GENERALIZED ANXIETY DISORDER): ICD-10-CM

## 2019-11-16 DIAGNOSIS — F32.1 CURRENT MODERATE EPISODE OF MAJOR DEPRESSIVE DISORDER WITHOUT PRIOR EPISODE (H): ICD-10-CM

## 2019-11-16 PROCEDURE — 99207 ZZC NO BILLABLE SERVICE THIS VISIT: CPT | Performed by: INTERNAL MEDICINE

## 2019-11-16 ASSESSMENT — PATIENT HEALTH QUESTIONNAIRE - PHQ9
SUM OF ALL RESPONSES TO PHQ QUESTIONS 1-9: 3
SUM OF ALL RESPONSES TO PHQ QUESTIONS 1-9: 3
10. IF YOU CHECKED OFF ANY PROBLEMS, HOW DIFFICULT HAVE THESE PROBLEMS MADE IT FOR YOU TO DO YOUR WORK, TAKE CARE OF THINGS AT HOME, OR GET ALONG WITH OTHER PEOPLE: SOMEWHAT DIFFICULT

## 2019-11-16 ASSESSMENT — ANXIETY QUESTIONNAIRES
3. WORRYING TOO MUCH ABOUT DIFFERENT THINGS: NOT AT ALL
6. BECOMING EASILY ANNOYED OR IRRITABLE: SEVERAL DAYS
4. TROUBLE RELAXING: NOT AT ALL
7. FEELING AFRAID AS IF SOMETHING AWFUL MIGHT HAPPEN: NOT AT ALL
GAD7 TOTAL SCORE: 2
1. FEELING NERVOUS, ANXIOUS, OR ON EDGE: SEVERAL DAYS
2. NOT BEING ABLE TO STOP OR CONTROL WORRYING: NOT AT ALL
GAD7 TOTAL SCORE: 2
5. BEING SO RESTLESS THAT IT IS HARD TO SIT STILL: NOT AT ALL
GAD7 TOTAL SCORE: 2
7. FEELING AFRAID AS IF SOMETHING AWFUL MIGHT HAPPEN: NOT AT ALL

## 2019-11-17 RX ORDER — CITALOPRAM HYDROBROMIDE 20 MG/1
20 TABLET ORAL DAILY
Qty: 90 TABLET | Refills: 1 | Status: SHIPPED | OUTPATIENT
Start: 2019-11-17 | End: 2020-04-29

## 2019-11-17 ASSESSMENT — PATIENT HEALTH QUESTIONNAIRE - PHQ9: SUM OF ALL RESPONSES TO PHQ QUESTIONS 1-9: 3

## 2019-11-17 ASSESSMENT — ANXIETY QUESTIONNAIRES: GAD7 TOTAL SCORE: 2

## 2019-11-17 NOTE — TELEPHONE ENCOUNTER
SUBJECTIVE:   Yvette Garcia is a 38 year old female who presents for e-visit today for the following health issues:    Patient with history of depression. Started on citalopram 9/11 at 10 mg and was helping, but needed higher dosage. Increased to 20 mg on 10/24 and feels this is a better dosage for her. Previously with primarily seasonal depression, but lingered into the summer without any particular new stressors. Denies side effects with citalopram.    PHQ-9 SCORE 9/11/2019 10/24/2019 11/16/2019   PHQ-9 Total Score MyChart - - 3 (Minimal depression)   PHQ-9 Total Score 13 9 3     WILFREDO-7 SCORE 9/11/2019 10/24/2019 11/16/2019   Total Score - - 2 (minimal anxiety)   Total Score 8 5 2           ASSESSMENT/PLAN:                                                      (F32.1) Current moderate episode of major depressive disorder without prior episode (H)  (F41.1) WILFREDO (generalized anxiety disorder)  Comment: much improved  Plan: citalopram (CELEXA) 20 MG tablet  - continue citalopram 20 mg once a day  - f/u for physical in 1 year - sooner if symptoms not controlled    FUTURE APPOINTMENTS:       - Follow-up visit in 1 year    [facility]

## 2020-05-04 ENCOUNTER — VIRTUAL VISIT (OUTPATIENT)
Dept: PEDIATRICS | Facility: CLINIC | Age: 40
End: 2020-05-04
Payer: COMMERCIAL

## 2020-05-04 DIAGNOSIS — F32.1 CURRENT MODERATE EPISODE OF MAJOR DEPRESSIVE DISORDER WITHOUT PRIOR EPISODE (H): ICD-10-CM

## 2020-05-04 DIAGNOSIS — F41.1 GAD (GENERALIZED ANXIETY DISORDER): ICD-10-CM

## 2020-05-04 PROCEDURE — 96127 BRIEF EMOTIONAL/BEHAV ASSMT: CPT | Performed by: INTERNAL MEDICINE

## 2020-05-04 PROCEDURE — 99213 OFFICE O/P EST LOW 20 MIN: CPT | Mod: GT | Performed by: INTERNAL MEDICINE

## 2020-05-04 RX ORDER — CITALOPRAM HYDROBROMIDE 20 MG/1
20 TABLET ORAL DAILY
Qty: 90 TABLET | Refills: 1 | Status: SHIPPED | OUTPATIENT
Start: 2020-05-04 | End: 2020-08-06

## 2020-05-04 ASSESSMENT — ANXIETY QUESTIONNAIRES
5. BEING SO RESTLESS THAT IT IS HARD TO SIT STILL: NOT AT ALL
3. WORRYING TOO MUCH ABOUT DIFFERENT THINGS: SEVERAL DAYS
6. BECOMING EASILY ANNOYED OR IRRITABLE: SEVERAL DAYS
GAD7 TOTAL SCORE: 3
2. NOT BEING ABLE TO STOP OR CONTROL WORRYING: NOT AT ALL
1. FEELING NERVOUS, ANXIOUS, OR ON EDGE: SEVERAL DAYS
7. FEELING AFRAID AS IF SOMETHING AWFUL MIGHT HAPPEN: NOT AT ALL
IF YOU CHECKED OFF ANY PROBLEMS ON THIS QUESTIONNAIRE, HOW DIFFICULT HAVE THESE PROBLEMS MADE IT FOR YOU TO DO YOUR WORK, TAKE CARE OF THINGS AT HOME, OR GET ALONG WITH OTHER PEOPLE: SOMEWHAT DIFFICULT

## 2020-05-04 ASSESSMENT — PATIENT HEALTH QUESTIONNAIRE - PHQ9
5. POOR APPETITE OR OVEREATING: NOT AT ALL
SUM OF ALL RESPONSES TO PHQ QUESTIONS 1-9: 2

## 2020-05-04 NOTE — PROGRESS NOTES
"  Yvette Garcia is a 39 year old female who is being evaluated via a billable video visit.      The patient has been notified of following:     \"This video visit will be conducted via a call between you and your physician/provider. We have found that certain health care needs can be provided without the need for an in-person physical exam.  This service lets us provide the care you need with a video conversation.  If a prescription is necessary we can send it directly to your pharmacy.  If lab work is needed we can place an order for that and you can then stop by our lab to have the test done at a later time.    Video visits are billed at different rates depending on your insurance coverage.  Please reach out to your insurance provider with any questions.    If during the course of the call the physician/provider feels a video visit is not appropriate, you will not be charged for this service.\"    Patient has given verbal consent for Video visit? Yes    How would you like to [facility] your [facility]? [facility]    Patient would like the video invitation sent by: Text to cell phone: [phone]    Will anyone else be joining your video visit? No      Subjective     Yvette Garcia is a 39 year old female who presents to clinic today for the following health issues:    History of Present Illness        She eats 4 or more servings of fruits and vegetables daily.She consumes 1 sweetened beverage(s) daily.She exercises with enough effort to increase her heart rate 30 to 60 minutes per day.  She exercises with enough effort to increase her heart rate 4 days per week.   She is taking medications regularly.    Depression and Anxiety Follow-Up    How are you doing with your depression since your last visit? Stable    How are you doing with your anxiety since your last visit?  Stable    Are you having other symptoms that might be associated with depression or anxiety? No    Have you had a significant life event? No     Do you have " any concerns with your use of alcohol or other drugs? No    Social History     Tobacco Use     Smoking status: Never Smoker     Smokeless tobacco: Never Used   Substance Use Topics     Alcohol use: Yes     Alcohol/week: 0.0 standard drinks     Comment: 1 drink weekly      Drug use: No     PHQ 10/24/2019 11/16/2019 5/4/2020   PHQ-9 Total Score 9 3 2   Q9: Thoughts of better off dead/self-harm past 2 weeks Not at all Not at all Not at all     WILFREDO-7 SCORE 10/24/2019 11/16/2019 5/4/2020   Total Score - 2 (minimal anxiety) -   Total Score 5 2 3     Last PHQ-9 5/4/2020   1.  Little interest or pleasure in doing things 0   2.  Feeling down, depressed, or hopeless 1   3.  Trouble falling or staying asleep, or sleeping too much 0   4.  Feeling tired or having little energy 1   5.  Poor appetite or overeating 0   6.  Feeling bad about yourself 0   7.  Trouble concentrating 0   8.  Moving slowly or restless 0   Q9: Thoughts of better off dead/self-harm past 2 weeks 0   PHQ-9 Total Score 2   Difficulty at work, home, or with people Somewhat difficult     WILFREDO-7  5/4/2020   1. Feeling nervous, anxious, or on edge 1   2. Not being able to stop or control worrying 0   3. Worrying too much about different things 1   4. Trouble relaxing 0   5. Being so restless that it is hard to sit still 0   6. Becoming easily annoyed or irritable 1   7. Feeling afraid, as if something awful might happen 0   WILFREDO-7 Total Score 3   If you checked any problems, how difficult have they made it for you to do your work, take care of things at home, or get along with other people? Somewhat difficult     In the past two weeks have you had thoughts of suicide or self-harm?  No.    Do you have concerns about your personal safety or the safety of others?   No    Suicide Assessment Five-step Evaluation and Treatment (SAFE-T)          Video Start Time: 12:52      Patient Active Problem List   Diagnosis     Current moderate episode of major depressive disorder  without prior episode (H)     WILFREDO (generalized anxiety disorder)     Past Surgical History:   Procedure Laterality Date     C ORAL SURGERY PROCEDURE  1998     CENTRAL LINE      with hemorrhage after delivery of baby     LAPAROSCOPY DIAGNOSTIC (GYN) Left 8/27/2017    Procedure: LAPAROSCOPY DIAGNOSTIC (GYN);  DIAGNOSTIC LAPAROSCOPY, LEFT SALPINGECTOMY;  Surgeon: Yvette Tong MD;  Location: [facility] OR     wisdom teeth  2003       Social History     Tobacco Use     Smoking status: Never Smoker     Smokeless tobacco: Never Used   Substance Use Topics     Alcohol use: Yes     Alcohol/week: 0.0 standard drinks     Comment: 1 drink weekly      Family History   Problem Relation Age of Onset     Breast Cancer Maternal Aunt         post-menopausal     Breast Cancer Paternal Grandmother         post-menopausal     Hyperlipidemia Mother      Asthma Mother      Sleep Apnea Mother      Hyperlipidemia Father      Depression Father      Myocardial Infarction Paternal Grandfather         60's         Current Outpatient Medications   Medication Sig Dispense Refill     acetaminophen (TYLENOL) 325 MG tablet Take 2 tablets (650 mg) by mouth every 4 hours as needed for other (mild pain) 100 tablet 0     cetirizine (ZYRTEC) 10 MG tablet Take 10 mg by mouth daily as needed for allergies       citalopram (CELEXA) 20 MG tablet Take 1 tablet (20 mg) by mouth daily 90 tablet 1     desogestrel-ethinyl estradiol (KARIVA) 0.15-0.02/0.01 MG (21/5) tablet Take 1 tablet by mouth daily 84 tablet 3     ibuprofen (ADVIL,MOTRIN) 400 MG tablet Take 1 tablet (400 mg) by mouth every 6 hours as needed for other (cramping) 30 tablet      Allergies   Allergen Reactions     Seasonal Allergies        Reviewed and updated as needed this visit by Provider         Review of Systems   ROS COMP: All other systems on a 10-point review are negative, unless otherwise noted in HPI        Objective    There were no vitals taken for this visit.  Estimated body mass  "index is 22.02 kg/m  as calculated from the following:    Height as of 10/24/19: 1.676 m (5' 6\").    Weight as of 10/24/19: 61.9 kg (136 lb 6.4 oz).  Physical Exam     GENERAL: healthy, alert and no distress  EYES: Eyes grossly normal to inspection, conjunctivae and sclerae normal  RESP: no audible wheeze, cough, or visible cyanosis.  No visible retractions or increased work of breathing.  Able to speak fully in complete sentences.  NEURO: Cranial nerves grossly intact, mentation intact and speech normal  PSYCH: mentation appears normal, affect normal/bright, judgement and insight intact, normal speech and appearance well-groomed      Diagnostic Test Results:  Labs reviewed in Epic        Assessment & Plan     Former pt of Dr. Richard Srivastava I have met pt before - needs new PCP.  Will refill meds and have pt f/u with me for annual physical and official establish care in October 2020.      ICD-10-CM    1. Current moderate episode of major depressive disorder without prior episode (H)  F32.1 citalopram (CELEXA) 20 MG tablet   2. WILFREDO (generalized anxiety disorder)  F41.1 citalopram (CELEXA) 20 MG tablet          There are no Patient Instructions on file for this visit.    Return in about 5 months (around 10/4/2020) for Preventive Visit, official establish care.    Maryuri Jonas MD  [facility]      Video-Visit Details    Type of service:  Video Visit    Video End Time:12:57 PM    Originating Location (pt. Location): Home    Distant Location (provider location):  [facility]     Platform used for Video Visit: Doximity    Return in about 5 months (around 10/4/2020) for Preventive Visit, official establish care.       Maryuri Jonas MD              "

## 2020-05-05 ASSESSMENT — ANXIETY QUESTIONNAIRES: GAD7 TOTAL SCORE: 3

## 2020-08-05 ENCOUNTER — MYC MEDICAL ADVICE (OUTPATIENT)
Dept: PEDIATRICS | Facility: CLINIC | Age: 40
End: 2020-08-05

## 2020-08-05 ENCOUNTER — HOSPITAL ENCOUNTER (OUTPATIENT)
Dept: BEHAVIORAL HEALTH | Facility: CLINIC | Age: 40
End: 2020-08-05
Attending: PSYCHIATRY & NEUROLOGY
Payer: COMMERCIAL

## 2020-08-05 DIAGNOSIS — F32.1 CURRENT MODERATE EPISODE OF MAJOR DEPRESSIVE DISORDER WITHOUT PRIOR EPISODE (H): Primary | ICD-10-CM

## 2020-08-05 DIAGNOSIS — F41.1 GAD (GENERALIZED ANXIETY DISORDER): ICD-10-CM

## 2020-08-05 PROCEDURE — 90791 PSYCH DIAGNOSTIC EVALUATION: CPT | Mod: 95 | Performed by: COUNSELOR

## 2020-08-05 NOTE — TELEPHONE ENCOUNTER
Dr. Jonas is out until 8/13/20.    I recommend this patient schedule a virtual visit with Dr. Jonas to discuss.    Quin Alegre MD  Internal Medicine/Pediatrics  [facility]

## 2020-08-05 NOTE — PROGRESS NOTES
[facility]  Evaluator Name:  Autumn Whelan, MSW, [facility], [facility]      PATIENT'S NAME: Yvette Garcia  PREFERRED NAME:  Ashli   PREFERRED PRONOUNS:    She/her   MRN:   [id number]  :   1980   ACCT. NUMBER: [id number]  DATE OF SERVICE: 20  START TIME: 10:38am  END TIME: 11:13am  PREFERRED PHONE:  [phone] [email or web address]  May we leave a program related message: Yes   Service Modality:  Video Visit:    Telemedicine Visit: The patient's condition can be safely assessed and treated via synchronous audio and visual telemedicine encounter.      Reason for Telemedicine Visit: Services only offered telehealth Covid    Originating Site (Patient Location): Patient's home    Distant Site (Provider Location): Provider Remote Setting    Consent:  The patient/guardian has verbally consented to: the potential risks and benefits of telemedicine (video visit) versus in person care; bill my insurance or make self-payment for services provided; and responsibility for payment of non-covered services.     Patient would like the video invitation sent by: Send to e-mail at: [email or web address]    Mode of Communication:  Video Conference via [employer]    As the provider I attest to compliance with applicable laws and regulations related to telemedicine.    STANDARD ADULT DIAGNOSTIC ASSESSMENT    Identifying Information:  Patient is a 39 year old.  The pronoun use throughout this assessment reflects the patient's chosen pronoun.  Patient was referred for an assessment by self.  Patient attended the session alone.     Chief Complaint:   Patient reported she has been dealing with depression for 2 or 3 years. She started Citalopram 10mg in the summer or  of . In 2020, she met with her doctor and increased Citalopram to 20mg, but her depression and anxiety have increased in the past few months. She can't commit to the timeframe of outpatient groups and she feels more comfortable with individual  counseling.      Social/Family History:  Patient reported they grew up in [place], [place]. Her parents remain . Patient is the youngest. She has a sister who lives in [place] and her brother lives in [facility]. She denied all forms of abuse.     The patient describes their cultural background as  American.  Cultural influences and impact on patient's life structure, values, norms, and healthcare: None.  Contextual influences on patient's health include: None.    These factors will be addressed in the Preliminary Treatment plan.  Patient identified their preferred language to be English. Patient reported they does not need the assistance of an  or other support involved in therapy.      Patient's highest education level was college graduate BS in Nursing. Patient identified the following learning problems: none reported.  Modifications will not be used to assist communication in therapy. Patient reports they are  able to understand written materials.    Patient's current relationship status is  for 12 years. She lives with her  and three daughters ages 9, 6, and 4. Housing is stable. Patient identified their sexual orientation as heterosexual. Patient identified parents, siblings and spouse as part of their support system.  Patient identified the quality of these relationships as good. She stated she has several close friends. She feels she has a perfect life, but isn't happy and isn't sure why. She stated other mom friends aren't feeling or struggling like her.      Patient is employed as a pediatric nurse, but only works about 4 hours every other week because of a low census. Patient reports their finances are obtained through spouse.  Patient does not identify finances as a current stressor.      Patient reported that they have not been involved with the legal system.    Patient's Strengths and Limitations:  Patient identified the following strengths or resources  that will help them succeed in treatment: commitment to health and well being, family support, insight, intelligence and motivation. Things that may interfere with the patient's success in treatment include: none identified.   _______________________________________________  Personal and Family Medical History:   Patient did report a family history of mental health concerns. Her father has depression and he takes 10mg of Citalopram. Patient reports family history includes Asthma in her mother; Breast Cancer in her maternal aunt and paternal grandmother; Depression in her father; Hyperlipidemia in her father and mother; Myocardial Infarction in her paternal grandfather; Sleep Apnea in her mother.     Patient reported the following previous diagnoses which include(s): an Anxiety Disorder and Depression. Patient reported symptoms began 2 or 3 years ago. Patient has not received mental health services in the past and she has not worked with an individual therapist.  Psychiatric Hospitalizations: None.  Patient denies a history of civil commitment.      Patient has had a physical exam to rule out medical causes for current symptoms.  Date of last physical exam was within the past year. 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 Patient reports no current medical concerns.  She reported she is exhausted all the time and unsure if exhaustion causes depression or the other way around. She has talked with her doctor, but there are no medical reasons for tiredness. There are not significant appetite / nutritional concerns / weight changes, but she has been eating a lot of junk food. Patient does not report a history of head injury / trauma / cognitive impairment.      Current Outpatient Medications   Medication     acetaminophen (TYLENOL) 325 MG tablet     cetirizine (ZYRTEC) 10 MG tablet     citalopram (CELEXA) 20 MG tablet     desogestrel-ethinyl estradiol (KARIVA) 0.15-0.02/0.01 MG (21/5) tablet     ibuprofen  (ADVIL,MOTRIN) 400 MG tablet     Medication Adherence:  Patient reports taking prescribed medications as prescribed.    Patient Allergies:    Allergies   Allergen Reactions     Seasonal Allergies      Medical History:    Past Medical History:   Diagnosis Date     Depressive disorder      History of asthma     as a child     Uncomplicated asthma 1992    outgrown     Current Mental Status Exam:   Appearance:  Appropriate    Eye Contact:  Good   Psychomotor:  Normal       Gait / station:  no problem  Attitude / Demeanor: Cooperative  Friendly  Speech      Rate / Production: Normal/ Responsive      Volume:  Normal  volume      Language:  intact  Mood:   Normal Sad   Affect:   Appropriate    Thought Content: Clear   Thought Process: Logical       Associations: No loosening of associations  Insight:   Good   Judgment:  Intact   Orientation:  All  Attention/concentration: Good    Rating Scales:    PHQ 11/16/2019 5/4/2020 8/5/2020   PHQ-9 Total Score 3 2 15   Q9: Thoughts of better off dead/self-harm past 2 weeks Not at all Not at all Several days   F/U: Thoughts of suicide or self-harm - - Yes   F/U: Self harm-plan - - Yes   F/U: Self-harm action - - No   F/U: Safety concerns - - No     WILFREDO-7 SCORE 11/16/2019 5/4/2020 8/5/2020   Total Score 2 (minimal anxiety) - 7 (mild anxiety)   Total Score 2 3 7     Clinical Global Impressions  First:  Considering your total clinical experience with this particular patient population, how severe are the patient's symptoms at this time?: 5 (8/5/2020 11:00 AM)  Most recent:  Compared to the patient's condition at the START of treatment, this patient's condition is: 4 (8/5/2020 11:00 AM)    Substance Use:  Patient reported no family history of chemical health issues.  Patient has not received chemical dependency treatment in the past.  Patient has not ever been to detox.   Patient reported the following problems as a result of their substance use: None.  Patient is not concerned about  "substance use.     Patient reported she drinks alcohol a couple times per month.   Patient denies using tobacco.  Patient denies using marijuana.  Patient reported she drinks coffee to not feel exhausted.   Patient denies use of other opiates.  Patient denies use of benzodiazepines.  Patient does not have other addictive behaviors she is concerned about.    CAGE-AID Total Score 8/5/2020   Total Score 0     Significant Losses / Trauma / Abuse / Neglect Issues:   Patient did not serve in the . Concerns for possible neglect are not present. There are indications or report of significant loss, trauma, abuse or neglect issues:  When she was ages 6 or 7, she had many UTIs and had several tests. She stated it was not handled well by the providers. They did the [facility] and she didn't need surgeries. She stated it was traumatic and she wonders if it has impacted her to have doctors in her private parts at such a young age.       Safety Assessment:   Current Safety Concerns:    [place] Suicide Severity Rating (Short Version) 8/5/2020   Over the past 2 weeks have you felt down, depressed, or hopeless? yes   Over the past 2 weeks have you had thoughts of killing yourself? yes   Have you ever attempted to kill yourself? no   Q1 Wished to be Dead (Past Month) yes   Q2 Suicidal Thoughts (Past Month) yes   Q3 Suicidal Thought Method yes   Q4 Suicidal Intent without Specific Plan no   Q5 Suicide Intent with Specific Plan no   Q6 Suicide Behavior (Lifetime) no     Patient denies current homicidal ideation and behaviors.   Patient reported she has recently had suicidal ideation, but \"I can't follow through with it\" because of her family. She stated she would never suicide, but has thought about the best way to do, like injecting herself with insulin and dying. She reported she would have to steal insulin from work and she wouldn't do that.    Patient denied risk behaviors associated with substance use.  Patient denies any high " "risk behaviors associated with mental health symptoms.  Patient reports the following current concerns for their personal safety: None.  Patient reports there are no firearms in the house.     History of Safety Concerns:  Patient denied a history of homicidal ideation.     Patient denied a history of personal safety concerns.    Patient denied a history of assaultive behaviors.    Patient denied a history of sexual assault behaviors.     Patient denied a history of risk behaviors associated with substance use.  Patient denies any history of high risk behaviors associated with mental health symptoms.    Patient reports the following protective factors: restricted access to lethal means, dedication to family/friends, safe and stable environment, adherence with prescribed medication, living with other people and daily obligations    Risk Plan:  See Preliminary Treatment Plan for Safety and Risk Management Plan    Review of Symptoms per patient report:  Depression: Change in sleep, Lack of interest, Change in energy level, Difficulties concentrating, Suicidal ideation, Feelings of hopelessness, Ruminations, Feeling sad, down, or depressed and Withdrawn - She has no motivation. All she wants to do is be in bed, and taking care of her kids exhausting. \"It's getting worse.\" She is sleeping too much - sleeping 12 hours at night and taking a 2 hour nap during the day.   Radha:  No Symptoms  Psychosis: No Symptoms  Anxiety: Excessive worry and Sleep disturbance    Panic:  She feels she has gotten closer to panic attacks, with a racing heart, but denied having panic attacks   Post Traumatic Stress Disorder:  No Symptoms   Eating Disorder: No Symptoms   ADD / ADHD:  No symptoms  Conduct Disorder: No symptoms  Autism Spectrum Disorder: No symptoms   Obsessive Compulsive Disorder: No Symptoms    Diagnostic Criteria:   A) Recurrent episode(s) - symptoms have been present during the same 2-week period and represent a change from " previous functioning 5 or more symptoms (required for diagnosis)   - Depressed mood. Note: In children and adolescents, can be irritable mood.     - Diminished interest or pleasure in all, or almost all, activities.    - Increased sleep.    - Psychomotor activity retardation.    - Fatigue or loss of energy.    - Feelings of worthlessness or inappropriate and excessive guilt.    - Diminished ability to think or concentrate, or indecisiveness.    - Recurrent thoughts of death (not just fear of dying), recurrent suicidal ideation without a specific plan, or a suicide attempt or a specific plan for committing suicide.   B) The symptoms cause clinically significant distress or impairment in social, occupational, or other important areas of functioning  C) The episode is not attributable to the physiological effects of a substance or to another medical condition  D) The occurence of major depressive episode is not better explained by other thought / psychotic disorders  E) There has never been a manic episode or hypomanic episode     Functional Status:  Patient reports the following functional impairments: home life, management of the household and or completion of tasks and self-care. Her  works very hard and provides for them, but he is frustrated because the house is a mess and she isn't doing much but she is doing as much as she can.       WHODAS 2.0 Total Score 8/5/2020   Total Score MyChart 26     Clinical Summary:  1. Reason for assessment: patient wants help with her depression and anxiety.  2. Psychosocial, Cultural and Contextual Factors: None identified.  3. Principal DSM5 Diagnoses  (Sustained by DSM5 Criteria Listed Above):   296.32 (F33.1) Major Depressive Disorder, Recurrent Episode, Moderate.  4. Other Diagnoses that is relevant to services:   None.  5. Provisional Diagnosis:  None  6. Prognosis: Expect Improvement.  7. Likely consequences of symptoms if not treated: Patient may need higher level of  "care.  8. Client strengths include:  educated, empathetic, employed, insightful, intelligent, motivated and open to learning .     Recommendations:     1. Plan for Safety and Risk Management:A safety and risk management plan has been developed including: Patient consented to co-developed safety plan.  Safety and risk management plan was completed.  Patient agreed to use safety plan should any safety concerns arise.  Report to child / adult protection services was NA.      2. Patient did not identify concerns with a cultural influence.     3. Initial Treatment will focus on: Depressed Mood.     4. Resources/Service Plan:    services are not indicated.   Modifications to assist communication are not indicated.   Additional disability accommodations are not indicated.      5. Collaboration:  Collaboration / coordination of treatment will be initiated with the following support professionals: None     6.  Referrals:  The following referral(s) will be initiated: [facility]. Next Scheduled Appointment:  placed an Epic order for [facility].    7. [place]: Recommendations:  Continue to drink responsibly and abstain from illicit substances.  Patient reports they are willing to follow these recommendations. Patient does not have a history of opiate use.    8. Records were reviewed at time of assessment. Information in this assessment was obtained from the medical record and provided by patient who is a good historian. Patient will have open access to their mental health medical record.    Eval type:  Mental Health    Staff Name/Credentials:  GUILLE Marin, LAURENCE, FELICITY       August 5, 2020              Outpatient Mental Health Services - Adult    MY COPING PLAN FOR SAFETY    PATIENT'S NAME: Yvette Garcia  MRN:   [id number]    SAFETY PLAN:    Step 1: Warning signs / cues (Thoughts, images, mood, situation, behavior) that a crisis may be developing:      Thoughts: \"I can't do this anymore\", \"This " "won't end\" and \"Nothing makes it better\"    Images: None    Thinking Processes: negative thoughts (bothersome, unwanted thoughts that come out of nowhere)    Mood: worsening depression, hopelessness and intense worry    Behaviors: isolating/withdrawing , not taking care of myself, not taking care of my responsibilities and sleeping too much    Situations: not working, Covid, schooling at home.        Step 2: Coping strategies - Things I can do to take my mind off of my problems without contacting another person (relaxation technique, physical activity):      Distress Tolerance Strategies:  get outside, watch a movie. \"My time is not my own because of my kids.\"     Physical Activities: deep breathing, stretching. She can't go for a walk because she can't leave her 4 year old. She takes naps every afternoon while the children are on the iPad.     Focus on helpful thoughts:  \"This is temporary\", \"I will get through this\" and remind myself of what is important to me: family    Step 3: People and social settings that provide distraction:     Name: Sister or parents Phone: In phone   Name:  at work if not at home.  Phone: in phone   park     Step 4: Remind myself of people and things that are important to me and worth living for:  Her family.     Step 5: When I am in crisis, I can ask these people to help me use my safety plan:     Name: Sister or parents Phone: In phone   Name:  at work if not at home.  Phone: in phone    Step 6: Making the environment safe:       remove things I could use to hurt myself and be around others    Step 7: Professionals or agencies I can contact during a crisis:      Suicide Prevention Lifeline: 4-786-472-TALK (1246)    Crisis Text Line Service (available 24 hours a day, 7 days a week): Text [place] to 597772    Call  **CRISIS (611021) from a cell phone to talk to a team of professionals who can help you.    Crisis Services By [facility]: Phone Number:   [place]     [phone]   [place]  "   [phone]   [place]    [phone]   [place]    [phone]   [place]    [phone]   [place] [phone]   [place]     [phone]       Call 911 or go to my nearest emergency department.     I helped develop this safety plan and agree to use it when needed.  I have been given a copy of this plan.        Today s date:  8/5/2020  Adapted from Safety Plan Template 2008 Ruby Christiansen and Bryant Chi is reprinted with the express permission of the authors.  No portion of the Safety Plan Template may be reproduced without the express, written permission.  You can contact the authors at [email or web address] or [email or web address]

## 2020-08-06 ENCOUNTER — TELEPHONE (OUTPATIENT)
Dept: PEDIATRICS | Facility: CLINIC | Age: 40
End: 2020-08-06

## 2020-08-06 ENCOUNTER — E-VISIT (OUTPATIENT)
Dept: PEDIATRICS | Facility: CLINIC | Age: 40
End: 2020-08-06
Payer: COMMERCIAL

## 2020-08-06 DIAGNOSIS — F41.1 GAD (GENERALIZED ANXIETY DISORDER): ICD-10-CM

## 2020-08-06 DIAGNOSIS — F32.1 CURRENT MODERATE EPISODE OF MAJOR DEPRESSIVE DISORDER WITHOUT PRIOR EPISODE (H): ICD-10-CM

## 2020-08-06 PROCEDURE — 99421 OL DIG E/M SVC 5-10 MIN: CPT | Performed by: PEDIATRICS

## 2020-08-06 RX ORDER — CITALOPRAM HYDROBROMIDE 20 MG/1
30 TABLET ORAL DAILY
Qty: 45 TABLET | Refills: 1 | Status: SHIPPED | OUTPATIENT
Start: 2020-08-06 | End: 2020-08-27

## 2020-08-06 ASSESSMENT — ANXIETY QUESTIONNAIRES
7. FEELING AFRAID AS IF SOMETHING AWFUL MIGHT HAPPEN: SEVERAL DAYS
GAD7 TOTAL SCORE: 8
5. BEING SO RESTLESS THAT IT IS HARD TO SIT STILL: NOT AT ALL
2. NOT BEING ABLE TO STOP OR CONTROL WORRYING: NOT AT ALL
3. WORRYING TOO MUCH ABOUT DIFFERENT THINGS: SEVERAL DAYS
4. TROUBLE RELAXING: NOT AT ALL
6. BECOMING EASILY ANNOYED OR IRRITABLE: NEARLY EVERY DAY
GAD7 TOTAL SCORE: 8
GAD7 TOTAL SCORE: 8
1. FEELING NERVOUS, ANXIOUS, OR ON EDGE: NEARLY EVERY DAY
7. FEELING AFRAID AS IF SOMETHING AWFUL MIGHT HAPPEN: SEVERAL DAYS

## 2020-08-06 ASSESSMENT — PATIENT HEALTH QUESTIONNAIRE - PHQ9
10. IF YOU CHECKED OFF ANY PROBLEMS, HOW DIFFICULT HAVE THESE PROBLEMS MADE IT FOR YOU TO DO YOUR WORK, TAKE CARE OF THINGS AT HOME, OR GET ALONG WITH OTHER PEOPLE: VERY DIFFICULT
SUM OF ALL RESPONSES TO PHQ QUESTIONS 1-9: 14
SUM OF ALL RESPONSES TO PHQ QUESTIONS 1-9: 14

## 2020-08-06 NOTE — TELEPHONE ENCOUNTER
Called pt. She would like to see if Dr. Jonas would increase her medication since she can't get into counseling until late September. Advised she submit and E visit to get this going and she will do that. Will call back as needed. Caryn Reyna RN on 8/6/2020 at 11:42 AM

## 2020-08-06 NOTE — TELEPHONE ENCOUNTER
General Call:   Who is calling:  Pt  Reason for Call:  appt  What are your questions or concerns:  Pt has phone visit with Dr Jonas 8/27/20 for anxiety/depression f/u, med review.  This 8/27/20 appt was first available; should pt have sooner appt?  Please call pt.  Please advise.  Date of last appointment with provider: n/a  Okay to leave a detailed message:Yes at Home number on file [phone] (home)   Thank you.  susan

## 2020-08-07 ASSESSMENT — PATIENT HEALTH QUESTIONNAIRE - PHQ9: SUM OF ALL RESPONSES TO PHQ QUESTIONS 1-9: 14

## 2020-08-07 ASSESSMENT — ANXIETY QUESTIONNAIRES: GAD7 TOTAL SCORE: 8

## 2020-08-27 ENCOUNTER — VIRTUAL VISIT (OUTPATIENT)
Dept: PEDIATRICS | Facility: CLINIC | Age: 40
End: 2020-08-27
Payer: COMMERCIAL

## 2020-08-27 DIAGNOSIS — F41.1 GAD (GENERALIZED ANXIETY DISORDER): ICD-10-CM

## 2020-08-27 DIAGNOSIS — F32.1 CURRENT MODERATE EPISODE OF MAJOR DEPRESSIVE DISORDER WITHOUT PRIOR EPISODE (H): ICD-10-CM

## 2020-08-27 PROCEDURE — 99214 OFFICE O/P EST MOD 30 MIN: CPT | Mod: 95 | Performed by: INTERNAL MEDICINE

## 2020-08-27 RX ORDER — BUPROPION HYDROCHLORIDE 150 MG/1
150 TABLET ORAL EVERY MORNING
Qty: 60 TABLET | Refills: 0 | Status: SHIPPED | OUTPATIENT
Start: 2020-08-27 | End: 2020-09-29

## 2020-08-27 RX ORDER — CITALOPRAM HYDROBROMIDE 20 MG/1
30 TABLET ORAL DAILY
Qty: 135 TABLET | Refills: 1 | Status: SHIPPED | OUTPATIENT
Start: 2020-08-27 | End: 2020-09-29

## 2020-08-27 ASSESSMENT — ANXIETY QUESTIONNAIRES
6. BECOMING EASILY ANNOYED OR IRRITABLE: NEARLY EVERY DAY
3. WORRYING TOO MUCH ABOUT DIFFERENT THINGS: SEVERAL DAYS
1. FEELING NERVOUS, ANXIOUS, OR ON EDGE: NEARLY EVERY DAY
7. FEELING AFRAID AS IF SOMETHING AWFUL MIGHT HAPPEN: NOT AT ALL
2. NOT BEING ABLE TO STOP OR CONTROL WORRYING: SEVERAL DAYS
5. BEING SO RESTLESS THAT IT IS HARD TO SIT STILL: NOT AT ALL
GAD7 TOTAL SCORE: 8
IF YOU CHECKED OFF ANY PROBLEMS ON THIS QUESTIONNAIRE, HOW DIFFICULT HAVE THESE PROBLEMS MADE IT FOR YOU TO DO YOUR WORK, TAKE CARE OF THINGS AT HOME, OR GET ALONG WITH OTHER PEOPLE: VERY DIFFICULT

## 2020-08-27 ASSESSMENT — PATIENT HEALTH QUESTIONNAIRE - PHQ9
5. POOR APPETITE OR OVEREATING: NOT AT ALL
SUM OF ALL RESPONSES TO PHQ QUESTIONS 1-9: 12

## 2020-08-27 NOTE — PROGRESS NOTES
"  Yvette Garcia is a 39 year old female who is being evaluated via a billable video visit.      The patient has been notified of following:     \"This video visit will be conducted via a call between you and your physician/provider. We have found that certain health care needs can be provided without the need for an in-person physical exam.  This service lets us provide the care you need with a video conversation.  If a prescription is necessary we can send it directly to your pharmacy.  If lab work is needed we can place an order for that and you can then stop by our lab to have the test done at a later time.    Video visits are billed at different rates depending on your insurance coverage.  Please reach out to your insurance provider with any questions.    If during the course of the call the physician/provider feels a video visit is not appropriate, you will not be charged for this service.\"    Patient has given verbal consent for Video visit? Yes  How would you like to obtain your AVS? MyChart  If you are dropped from the video visit, the video invite should be resent to: Text to cell phone: [phone]  Will anyone else be joining your video visit? No    Subjective     Yvette Garcia is a 39 year old female who presents today via video visit for the following health issues:    HPI    Depression and Anxiety Follow-Up    How are you doing with your depression since your last visit? Was worse but a little better since increase in med    How are you doing with your anxiety since your last visit?  Was worse but a little better since increase in med    Are you having other symptoms that might be associated with depression or anxiety? No    Have you had a significant life event? No     Do you have any concerns with your use of alcohol or other drugs? No    Social History     Tobacco Use     Smoking status: Never Smoker     Smokeless tobacco: Never Used   Substance Use Topics     Alcohol use: Yes     " Alcohol/week: 0.0 standard drinks     Comment: 1 drink weekly      Drug use: No     PHQ 8/5/2020 8/6/2020 8/27/2020   PHQ-9 Total Score 15 14 12   Q9: Thoughts of better off dead/self-harm past 2 weeks Several days Several days Several days   F/U: Thoughts of suicide or self-harm Yes Yes -   F/U: Self harm-plan Yes Yes -   F/U: Self-harm action No No -   F/U: Safety concerns No No -     WILFREDO-7 SCORE 8/5/2020 8/6/2020 8/27/2020   Total Score 7 (mild anxiety) 8 (mild anxiety) -   Total Score 7 8 8     Last PHQ-9 8/27/2020   1.  Little interest or pleasure in doing things 2   2.  Feeling down, depressed, or hopeless 2   3.  Trouble falling or staying asleep, or sleeping too much 3   4.  Feeling tired or having little energy 3   5.  Poor appetite or overeating 0   6.  Feeling bad about yourself 1   7.  Trouble concentrating 0   8.  Moving slowly or restless 0   Q9: Thoughts of better off dead/self-harm past 2 weeks 1   PHQ-9 Total Score 12   Difficulty at work, home, or with people Very difficult   In the past two weeks have you had thoughts of suicide or self harm? -   Do you have concerns about your personal safety or the safety of others? -   In the past 2 weeks have you thought about a plan or had intention to harm yourself? -   In the past 2 weeks have you acted on these thoughts in any way? -     WILFREDO-7  8/27/2020   1. Feeling nervous, anxious, or on edge 3   2. Not being able to stop or control worrying 1   3. Worrying too much about different things 1   4. Trouble relaxing 0   5. Being so restless that it is hard to sit still 0   6. Becoming easily annoyed or irritable 3   7. Feeling afraid, as if something awful might happen 0   WILFREDO-7 Total Score 8   If you checked any problems, how difficult have they made it for you to do your work, take care of things at home, or get along with other people? Very difficult       Suicide Assessment Five-step Evaluation and Treatment (SAFE-T)    Video Start Time: 5:06  PM    Suicidal ideation - present but is very adamant she would not act on her thoughts due to her family.  I educated her about the nature of suicidal ideation as part of the disease process and provided resources if symptoms escalate (see PI).    Initiated mental health intake - first appt is middle of September.  Has been on citalopram since last fall - about a year.  This is the only antidepressant she has tried.  Initially noticed a lot of benefit, but feels she keeps needing to increase her dose as the effects wear off.  Decision to start celexa based on it being effective in her dad.    She is reporting mild improvement on 30 mg (increased from 20 mg daily), but still reporting a daily struggle with significant fatigue.  Feeling like she sleeps way too much.  Lots of feelings of hopelessness and dread over the upcoming year, as her kids will be homeschooled due to COVID.      Review of Systems   All other systems on a 10-point review are negative, unless otherwise noted in HPI        Objective           Vitals:  No vitals were obtained today due to virtual visit.    Physical Exam     GENERAL: Healthy, alert and no distress  EYES: Eyes grossly normal to inspection.  No discharge or erythema, or obvious scleral/conjunctival abnormalities.  RESP: No audible wheeze, cough, or visible cyanosis.  No visible retractions or increased work of breathing.    SKIN: Visible skin clear. No significant rash, abnormal pigmentation or lesions.  NEURO: Cranial nerves grossly intact.  Mentation and speech appropriate for age.  PSYCH: mentation appears normal, tearful and judgement and insight intact              Assessment & Plan     Current moderate episode of major depressive disorder without prior episode (H)  WILFREDO (generalized anxiety disorder)  Improved but depressive symptoms still moderate to severe with suicidal ideation (no plan or intent).  First therapy appt not until mid-September.    Plan:  Will connect with [facility] to  see if she can start therapy sooner or at least reach out and discuss suicidal ideation and safety plan.  Will continue current dose of celexa for now.  Will add bupropion 150 mg XR daily, as this may be a good adjunct and help with her fatigue and psychomotor depression.  Will f/up via mychart in 1 week to make sure no side effects (insomnia, worsening anxiety).  Will f/up via video visit in a month and reassess - at that time, will assess whether she might benefit from increasing her dose of wellbutrin, celexa, or possibly switching celexa all together (might opt for prozac, which is also more activating).  - citalopram (CELEXA) 20 MG tablet; Take 1.5 tablets (30 mg) by mouth daily  - buPROPion (WELLBUTRIN XL) 150 MG 24 hr tablet; Take 1 tablet (150 mg) by mouth every morning       See Patient Instructions    Return in about 1 month (around 9/28/2020) for Video Visit (f/up week of 9/28).    Maryuri Jonas MD  [facility]      Video-Visit Details    Type of service:  Video Visit    Video End Time:5:28 PM    Originating Location (pt. Location): Home    Distant Location (provider location):  [facility]     Platform used for Video Visit: [employer]

## 2020-08-27 NOTE — Clinical Note
Hello - I asked my MA to place her directly on your schedule instead of having your call her because I am a little worried about her.  She is quite depressed.  She is a mom of young children and very overwhelmed.  She has a first appt with psychology sept 16, but I was hoping she could start seeing you a little sooner, if nothing else, just to maybe check in with her.  I just added wellbutrin and she is on celexa 30 mg.

## 2020-08-27 NOTE — PATIENT INSTRUCTIONS
"Recognizing Suicide Warning Signs in Yourself  People who are thinking about suicide may not know they are depressed. Certain thoughts, feelings, and actions can be signals that let you know you may need help. The best thing you can do is watch for signs that you may be at risk. Then, ask for help. You can talk to your regular healthcare provider or seek help from a mental health provider.  Depression  Depression is a treatable illness. To know if depression is causing you to feel like ending your life, ask yourself:    Do I feel worthless, guilty, helpless, or hopeless?    Have I been feeling sad, down, or blue on most days?    Have I lost interest in my work or people I used to enjoy?    Do I have trouble sleeping or do I sleep too much?    Do I eat more or less than usual?    Do I feel tired, weak, and low on energy?    Do I feel restless and unable to sit still?    Do I have trouble thinking or making choices?    Do I cry more than usual?    Do I feel life isn't worth living?  Warning signs for suicide    Thinking often about taking your life    Planning how you may attempt it    Talking or writing about committing suicide    Feeling that death is the only solution to your problems    Feeling a pressing need to make out your will or arrange your     Giving away things you own    Participating in risky behaviors, such as sex with someone you don't know or drinking and driving    Buying a lethal weapon, such as a gun, or hoarding medicines that could be used in an over dose  What you can do  If you notice any of these warning signs, ask someone for help and/or...      GO TO THE ER     I recommend [facility] where acute inpatient psychiatric services are available if needed.    CALL THE 24 HOUR CRISIS LINE     [phone] (or text [phone])    GET AN URGENT EVALUATION:      Go to any Diagnostic Evaluation Center (i.e. [facility] ER) and request a \"DEC Assessment.\"  For more " locations and info, visit: [email or web address]    ASK YOUR DOCTOR FOR A REFERRAL     For more information about depression:    National [place] of Mental Gzjriz320-019-3650wcy.[facility].nih.gov    National Suicide Prevention Fogmvehl705-238-9852 (307-160-GSZM)www.suicidepreventionlifeline.org    National [place] on Mental Txdkqrd005-405-2903aqv.rafy.org    Mental Health Zxcryky648-212-6390kut.[facility].org    National Suicide Wdrvslr253-619-6343 (800-SUICIDE)

## 2020-08-28 ASSESSMENT — ANXIETY QUESTIONNAIRES: GAD7 TOTAL SCORE: 8

## 2020-09-10 ENCOUNTER — VIRTUAL VISIT (OUTPATIENT)
Dept: BEHAVIORAL HEALTH | Facility: CLINIC | Age: 40
End: 2020-09-10
Payer: COMMERCIAL

## 2020-09-10 DIAGNOSIS — F41.1 GAD (GENERALIZED ANXIETY DISORDER): ICD-10-CM

## 2020-09-10 DIAGNOSIS — F32.1 CURRENT MODERATE EPISODE OF MAJOR DEPRESSIVE DISORDER WITHOUT PRIOR EPISODE (H): Primary | ICD-10-CM

## 2020-09-10 PROCEDURE — 99207 ZZC NON-BILLABLE SERV PER CHARTING: CPT | Performed by: SOCIAL WORKER

## 2020-09-10 NOTE — PROGRESS NOTES
[facility] met via video from 8:02am to 8:10am briefly. Patient was scheduled with [facility] a few weeks ago due to worsening depression and anxiety. Patient was put on Wellbutrin around that time and since then her symptoms have improved greatly. Patient reported that she has far more energy to spend time with her kids and do the things that she needs to do. Patient expressed that she is not sure she wants to start therapy at this time because she is happy with the progress she has made. [facility] encouraged patient to follow-up with the clinic if her symptoms worsen or she is interested in starting therapy. Patient denied SI or thoughts of self-harm.    Maribel Vargas, [facility], [facility]

## 2020-09-11 DIAGNOSIS — F32.1 CURRENT MODERATE EPISODE OF MAJOR DEPRESSIVE DISORDER WITHOUT PRIOR EPISODE (H): ICD-10-CM

## 2020-09-11 DIAGNOSIS — F41.1 GAD (GENERALIZED ANXIETY DISORDER): ICD-10-CM

## 2020-09-11 RX ORDER — BUPROPION HYDROCHLORIDE 150 MG/1
150 TABLET ORAL EVERY MORNING
Qty: 60 TABLET | Refills: 0 | OUTPATIENT
Start: 2020-09-11

## 2020-09-15 DIAGNOSIS — Z30.41 ENCOUNTER FOR SURVEILLANCE OF CONTRACEPTIVE PILLS: ICD-10-CM

## 2020-09-15 RX ORDER — DESOG-E.ESTRADIOL/E.ESTRADIOL 21-5 (28)
TABLET ORAL
Qty: 84 TABLET | Refills: 0 | Status: SHIPPED | OUTPATIENT
Start: 2020-09-15 | End: 2020-09-29

## 2020-09-15 NOTE — TELEPHONE ENCOUNTER
Prescription approved per [facility] Refill Protocol.    Radha Monsalve RN

## 2020-09-29 ENCOUNTER — VIRTUAL VISIT (OUTPATIENT)
Dept: PEDIATRICS | Facility: CLINIC | Age: 40
End: 2020-09-29
Payer: COMMERCIAL

## 2020-09-29 DIAGNOSIS — F41.1 GAD (GENERALIZED ANXIETY DISORDER): ICD-10-CM

## 2020-09-29 DIAGNOSIS — Z30.41 ENCOUNTER FOR SURVEILLANCE OF CONTRACEPTIVE PILLS: ICD-10-CM

## 2020-09-29 DIAGNOSIS — F32.1 CURRENT MODERATE EPISODE OF MAJOR DEPRESSIVE DISORDER WITHOUT PRIOR EPISODE (H): ICD-10-CM

## 2020-09-29 PROCEDURE — 99213 OFFICE O/P EST LOW 20 MIN: CPT | Mod: 95 | Performed by: INTERNAL MEDICINE

## 2020-09-29 RX ORDER — DESOGESTREL AND ETHINYL ESTRADIOL 21-5 (28)
1 KIT ORAL DAILY
Qty: 84 TABLET | Refills: 0 | Status: SHIPPED | OUTPATIENT
Start: 2020-09-29 | End: 2021-02-04

## 2020-09-29 RX ORDER — BUPROPION HYDROCHLORIDE 150 MG/1
150 TABLET ORAL EVERY MORNING
Qty: 90 TABLET | Refills: 1 | Status: SHIPPED | OUTPATIENT
Start: 2020-09-29 | End: 2021-03-29

## 2020-09-29 RX ORDER — CITALOPRAM HYDROBROMIDE 20 MG/1
30 TABLET ORAL DAILY
Qty: 135 TABLET | Refills: 1 | Status: SHIPPED | OUTPATIENT
Start: 2020-09-29 | End: 2021-08-16

## 2020-09-29 ASSESSMENT — ANXIETY QUESTIONNAIRES
2. NOT BEING ABLE TO STOP OR CONTROL WORRYING: NOT AT ALL
1. FEELING NERVOUS, ANXIOUS, OR ON EDGE: SEVERAL DAYS
GAD7 TOTAL SCORE: 2
3. WORRYING TOO MUCH ABOUT DIFFERENT THINGS: NOT AT ALL
IF YOU CHECKED OFF ANY PROBLEMS ON THIS QUESTIONNAIRE, HOW DIFFICULT HAVE THESE PROBLEMS MADE IT FOR YOU TO DO YOUR WORK, TAKE CARE OF THINGS AT HOME, OR GET ALONG WITH OTHER PEOPLE: SOMEWHAT DIFFICULT
5. BEING SO RESTLESS THAT IT IS HARD TO SIT STILL: NOT AT ALL
6. BECOMING EASILY ANNOYED OR IRRITABLE: SEVERAL DAYS
7. FEELING AFRAID AS IF SOMETHING AWFUL MIGHT HAPPEN: NOT AT ALL

## 2020-09-29 ASSESSMENT — PATIENT HEALTH QUESTIONNAIRE - PHQ9
5. POOR APPETITE OR OVEREATING: NOT AT ALL
SUM OF ALL RESPONSES TO PHQ QUESTIONS 1-9: 2

## 2020-09-29 NOTE — PROGRESS NOTES
"  Yvette Garcia is a 39 year old female who is being evaluated via a billable video visit.      The patient has been notified of following:     \"This video visit will be conducted via a call between you and your physician/provider. We have found that certain health care needs can be provided without the need for an in-person physical exam.  This service lets us provide the care you need with a video conversation.  If a prescription is necessary we can send it directly to your pharmacy.  If lab work is needed we can place an order for that and you can then stop by our lab to have the test done at a later time.    Video visits are billed at different rates depending on your insurance coverage.  Please reach out to your insurance provider with any questions.    If during the course of the call the physician/provider feels a video visit is not appropriate, you will not be charged for this service.\"    Patient has given verbal consent for Video visit? Yes  How would you like to obtain your AVS? MyChart  If you are dropped from the video visit, the video invite should be resent to: Text to cell phone: [phone]  Will anyone else be joining your video visit? No    Subjective     Yvette Garcia is a 39 year old female who presents today via video visit for the following health issues:    HPI    Depression and Anxiety Follow-Up     How are you doing with your depression since your last visit? Improved     How are you doing with your anxiety since your last visit?  Improved     Are you having other symptoms that might be associated with depression or anxiety? No    Have you had a significant life event? No     Do you have any concerns with your use of alcohol or other drugs? No    Social History     Tobacco Use     Smoking status: Never Smoker     Smokeless tobacco: Never Used   Substance Use Topics     Alcohol use: Yes     Alcohol/week: 0.0 standard drinks     Comment: 1 drink weekly      Drug use: No     PHQ " 8/6/2020 8/27/2020 9/29/2020   PHQ-9 Total Score 14 12 2   Q9: Thoughts of better off dead/self-harm past 2 weeks Several days Several days Not at all   F/U: Thoughts of suicide or self-harm Yes - -   F/U: Self harm-plan Yes - -   F/U: Self-harm action No - -   F/U: Safety concerns No - -     WILFREDO-7 SCORE 8/6/2020 8/27/2020 9/29/2020   Total Score 8 (mild anxiety) - -   Total Score 8 8 2     Last PHQ-9 9/29/2020   1.  Little interest or pleasure in doing things 0   2.  Feeling down, depressed, or hopeless 0   3.  Trouble falling or staying asleep, or sleeping too much 1   4.  Feeling tired or having little energy 1   5.  Poor appetite or overeating 0   6.  Feeling bad about yourself 0   7.  Trouble concentrating 0   8.  Moving slowly or restless 0   Q9: Thoughts of better off dead/self-harm past 2 weeks 0   PHQ-9 Total Score 2   Difficulty at work, home, or with people Somewhat difficult   In the past two weeks have you had thoughts of suicide or self harm? -   Do you have concerns about your personal safety or the safety of others? -   In the past 2 weeks have you thought about a plan or had intention to harm yourself? -   In the past 2 weeks have you acted on these thoughts in any way? -     WILFREDO-7  9/29/2020   1. Feeling nervous, anxious, or on edge 1   2. Not being able to stop or control worrying 0   3. Worrying too much about different things 0   4. Trouble relaxing 0   5. Being so restless that it is hard to sit still 0   6. Becoming easily annoyed or irritable 1   7. Feeling afraid, as if something awful might happen 0   WILFREDO-7 Total Score 2   If you checked any problems, how difficult have they made it for you to do your work, take care of things at home, or get along with other people? Somewhat difficult       Suicide Assessment Five-step Evaluation and Treatment (SAFE-T)      Video Start Time: 1:43 PM    Bupropion 150 mg - seems to be working well.  Gets flu shot at work.      Review of Systems   All other  systems on a 10-point review are negative, unless otherwise noted in HPI        Objective           Vitals:  No vitals were obtained today due to virtual visit.    Physical Exam     GENERAL: Healthy, alert and no distress  EYES: Eyes grossly normal to inspection.  No discharge or erythema, or obvious scleral/conjunctival abnormalities.  RESP: No audible wheeze, cough, or visible cyanosis.  No visible retractions or increased work of breathing.    SKIN: Visible skin clear. No significant rash, abnormal pigmentation or lesions.  NEURO: Cranial nerves grossly intact.  Mentation and speech appropriate for age.  PSYCH: Mentation appears normal, affect normal/bright, judgement and insight intact, normal speech and appearance well-groomed.      No results found for this or any previous visit (from the past 24 hour(s)).        Assessment & Plan     Current moderate episode of major depressive disorder without prior episode (H)  WILFREDO (generalized anxiety disorder)  Much improved.  No suicidal ideation.  Met with [facility] a couple of times and doing so well she decided to hold off on referral for long-term therapy.  Has number if she changes her mind.  Can f/up with me in 6 months for at annual check-up or sooner if needed  - buPROPion (WELLBUTRIN XL) 150 MG 24 hr tablet; Take 1 tablet (150 mg) by mouth every morning  - citalopram (CELEXA) 20 MG tablet; Take 1.5 tablets (30 mg) by mouth daily     Encounter for surveillance of contraceptive pills  - Stable, no side effects with medications, refilled meds today  - desogestrel-ethinyl estradiol (KARIVA) 0.15-0.02/0.01 MG (21/5) tablet; Take 1 tablet by mouth daily      FUTURE APPOINTMENTS:       - Follow-up visit in 6 month for annual check-up and f/up on depression meds    Return in about 6 months (around 3/29/2021) for Preventive Visit and depression f/up (call in Jan or Feb 2021 to schedule).    Maryuri Jonas MD  [facility]      Video-Visit Details    Type of  service:  Video Visit    Video End Time:1:50 PM    Originating Location (pt. Location): Home    Distant Location (provider location):  [facility]     Platform used for Video Visit: Jory

## 2020-09-29 NOTE — LETTER
My Depression Action Plan  Name: Yvette Garcia   Date of Birth 1980  Date: 9/29/2020    My doctor: Carley Ramos   My clinic: [address]  [address]  [facility] [address] [phone]          GREEN    ZONE   Good Control    What it looks like:     Things are going generally well. You have normal ups and downs. You may even feel depressed from time to time, but bad moods usually last less than a day.   What you need to do:  1. Continue to care for yourself (see self care plan)  2. Check your depression survival kit and update it as needed  3. Follow your physician s recommendations including any medication.  4. Do not stop taking medication unless you consult with your physician first.           YELLOW         ZONE Getting Worse    What it looks like:     Depression is starting to interfere with your life.     It may be hard to get out of bed; you may be starting to isolate yourself from others.    Symptoms of depression are starting to last most all day and this has happened for several days.     You may have suicidal thoughts but they are not constant.   What you need to do:     1. Call your care team. Your response to treatment will improve if you keep your care team informed of your progress. Yellow periods are signs an adjustment may need to be made.     2. Continue your self-care.  Just get dressed and ready for the day.  Don't give yourself time to talk yourself out of it.    3. Talk to someone in your support network.    4. Open up your Depression Self-Care Plan/Wellness Kit.           RED    ZONE Medical Alert - Get Help    What it looks like:     Depression is seriously interfering with your life.     You may experience these or other symptoms: You can t get out of bed most days, can t work or engage in other necessary activities, you have trouble taking care of basic hygiene, or basic responsibilities, thoughts of suicide or death  that will not go away, self-injurious behavior.     What you need to do:  1. Call your care team and request a same-day appointment. If they are not available (weekends or after hours) call your local crisis line, emergency room or 911.            Depression Self-Care Plan / Wellness Kit    Self-Care for Depression  Here s the deal. Your body and mind are really not as separate as most people think.  What you do and think affects how you feel and how you feel influences what you do and think. This means if you do things that people who feel good do, it will help you feel better.  Sometimes this is all it takes.  There is also a place for medication and therapy depending on how severe your depression is, so be sure to consult with your medical provider and/ or Behavioral Health Consultant if your symptoms are worsening or not improving.     In order to better manage my stress, I will:    Exercise  Get some form of exercise, every day. This will help reduce pain and release endorphins, the  feel good  chemicals in your brain. This is almost as good as taking antidepressants!  This is not the same as joining a gym and then never going! (they count on that by the way ) It can be as simple as just going for a walk or doing some gardening, anything that will get you moving.      Hygiene   Maintain good hygiene (get out of bed in the morning, make your bed, brush your teeth, take a shower, and get dressed like you were going to work, even if you are unemployed).  If your clothes don't fit try to get ones that do.    Diet  Strive to eat foods that are good for me, drink plenty of water, and avoid excessive sugar, caffeine, alcohol, and other mood-altering substances.  Some foods that are helpful in depression are: complex carbohydrates, B vitamins, flaxseed, fish or fish oil, fresh fruits and vegetables.    Psychotherapy  Agree to participate in Individual Therapy (if recommended).    Medication  If prescribed medications,  I agree to take them.  Missing doses can result in serious side effects.  I understand that drinking alcohol, or other illicit drug use, may cause potential side effects.  I will not stop my medication abruptly without first discussing it with my provider.    Staying Connected With Others  Stay in touch with my friends, family members, and my primary care provider/team.    Use your imagination  Be creative.  We all have a creative side; it doesn t matter if it s oil painting, sand castles, or mud pies! This will also kick up the endorphins.    Witness Beauty  (AKA stop and smell the roses) Take a look outside, even in mid-winter. Notice colors, textures. Watch the squirrels and birds.     Service to others  Be of service to others.  There is always someone else in need.  By helping others we can  get out of ourselves  and remember the really important things.  This also provides opportunities for practicing all the other parts of the program.    Humor  Laugh and be silly!  Adjust your TV habits for less news and crime-drama and more comedy.    Control your stress  Try breathing deep, massage therapy, biofeedback, and meditation. Find time to relax each day.     Crisis Text Line  http://www.crisistextline.org    The Crisis Text Line serves anyone, in any type of crisis, providing access to free, 24/7 support and information via the medium people already use and trust:    Here's how it works:  1.  Text 839-941 from anywhere in the [place], anytime, about any type of crisis.  2.  A live, trained Crisis Counselor receives the text and responds quickly.  3.  The volunteer Crisis Counselor will help you move from a 'hot moment to a cool moment'.    My support system    Clinic Contact: [facility] Phone number: [phone]   Contact 1:  Phone number:    Contact 2:  Phone number:    Baptism/:  Phone number:    Therapist:  Phone number:    Local crisis center:    Phone number:    Other community  support:  Phone number:

## 2020-09-30 ASSESSMENT — ANXIETY QUESTIONNAIRES: GAD7 TOTAL SCORE: 2

## 2020-12-14 ENCOUNTER — HEALTH MAINTENANCE LETTER (OUTPATIENT)
Age: 40
End: 2020-12-14

## 2021-02-03 DIAGNOSIS — Z30.41 ENCOUNTER FOR SURVEILLANCE OF CONTRACEPTIVE PILLS: ICD-10-CM

## 2021-02-04 RX ORDER — DESOGESTREL AND ETHINYL ESTRADIOL 21-5 (28)
1 KIT ORAL DAILY
Qty: 84 TABLET | Refills: 1 | Status: SHIPPED | OUTPATIENT
Start: 2021-02-04 | End: 2021-07-13

## 2021-02-04 NOTE — TELEPHONE ENCOUNTER
Prescription approved per [facility] Refill Protocol.    Amanda HERNANDEZ, RN, BSN

## 2021-05-18 ENCOUNTER — VIRTUAL VISIT (OUTPATIENT)
Dept: FAMILY MEDICINE | Facility: CLINIC | Age: 41
End: 2021-05-18
Payer: COMMERCIAL

## 2021-05-18 VITALS — BODY MASS INDEX: 20.89 KG/M2 | HEART RATE: 88 BPM | WEIGHT: 130 LBS | HEIGHT: 66 IN

## 2021-05-18 DIAGNOSIS — J02.9 SORE THROAT: Primary | ICD-10-CM

## 2021-05-18 PROCEDURE — 99213 OFFICE O/P EST LOW 20 MIN: CPT | Mod: 95 | Performed by: PHYSICIAN ASSISTANT

## 2021-05-18 RX ORDER — AMOXICILLIN 500 MG/1
500 CAPSULE ORAL 2 TIMES DAILY
Qty: 20 CAPSULE | Refills: 0 | Status: SHIPPED | OUTPATIENT
Start: 2021-05-18 | End: 2021-05-28

## 2021-05-18 ASSESSMENT — ENCOUNTER SYMPTOMS
RHINORRHEA: 1
EYES NEGATIVE: 1
CARDIOVASCULAR NEGATIVE: 1
GASTROINTESTINAL NEGATIVE: 1
APPETITE CHANGE: 1
SORE THROAT: 1
COUGH: 1

## 2021-05-18 ASSESSMENT — ANXIETY QUESTIONNAIRES
5. BEING SO RESTLESS THAT IT IS HARD TO SIT STILL: NOT AT ALL
IF YOU CHECKED OFF ANY PROBLEMS ON THIS QUESTIONNAIRE, HOW DIFFICULT HAVE THESE PROBLEMS MADE IT FOR YOU TO DO YOUR WORK, TAKE CARE OF THINGS AT HOME, OR GET ALONG WITH OTHER PEOPLE: NOT DIFFICULT AT ALL
GAD7 TOTAL SCORE: 2
1. FEELING NERVOUS, ANXIOUS, OR ON EDGE: SEVERAL DAYS
6. BECOMING EASILY ANNOYED OR IRRITABLE: SEVERAL DAYS
3. WORRYING TOO MUCH ABOUT DIFFERENT THINGS: NOT AT ALL
7. FEELING AFRAID AS IF SOMETHING AWFUL MIGHT HAPPEN: NOT AT ALL
2. NOT BEING ABLE TO STOP OR CONTROL WORRYING: NOT AT ALL

## 2021-05-18 ASSESSMENT — PATIENT HEALTH QUESTIONNAIRE - PHQ9
5. POOR APPETITE OR OVEREATING: NOT AT ALL
SUM OF ALL RESPONSES TO PHQ QUESTIONS 1-9: 1

## 2021-05-18 ASSESSMENT — MIFFLIN-ST. JEOR: SCORE: 1286.43

## 2021-05-18 NOTE — PROGRESS NOTES
Melinda is a 40 year old who is being evaluated via a billable video visit.      How would you like to obtain your AVS? MyChart  If the video visit is dropped, the invitation should be resent by: Text to cell phone: [phone]  Will anyone else be joining your video visit? No    Video Start Time: 11:13 AM        Subjective   Melinda is a 40 year old who presents for the following health issues     Cough  Associated symptoms include rhinorrhea and sore throat.   History of Present Illness       She eats 2-3 servings of fruits and vegetables daily.She consumes 1 sweetened beverage(s) daily.She exercises with enough effort to increase her heart rate 20 to 29 minutes per day.  She exercises with enough effort to increase her heart rate 4 days per week.   She is taking medications regularly.       Acute Illness  Acute illness concerns: cough/sore throat  Onset/Duration: 2 weeks  Symptoms:  Fever: YES  Chills/Sweats: no  Headache (location?): no  Sinus Pressure: no  Conjunctivitis:  no  Ear Pain: no  Rhinorrhea: YES--allergies  Congestion: YES--allergies  Sore Throat: YES  Cough: YES-productive of yellow sputum, productive of green sputum  Wheeze: no  Decreased Appetite: YES  Nausea: no  Vomiting: no  Diarrhea: no  Dysuria/Freq.: no  Dysuria or Hematuria: no  Fatigue/Achiness: no  Sick/Strep Exposure: no  Therapies tried and outcome: None  Negative rapid strep test last week  Fully vaccinated for COVID      Review of Systems   Constitutional: Positive for appetite change.   HENT: Positive for rhinorrhea and sore throat.    Eyes: Negative.    Respiratory: Positive for cough.    Cardiovascular: Negative.    Gastrointestinal: Negative.             Objective           Vitals:  No vitals were obtained today due to virtual visit.    Physical Exam   GENERAL: Healthy, alert and no distress  EYES: Eyes grossly normal to inspection.  No discharge or erythema, or obvious scleral/conjunctival abnormalities.  RESP: No audible wheeze,  cough, or visible cyanosis.  No visible retractions or increased work of breathing.    SKIN: Visible skin clear. No significant rash, abnormal pigmentation or lesions.  NEURO: Cranial nerves grossly intact.  Mentation and speech appropriate for age.  PSYCH: Mentation appears normal, affect normal/bright, judgement and insight intact, normal speech and appearance well-groomed.        (J02.9) Sore throat  (primary encounter diagnosis)  Comment:   Plan: amoxicillin (AMOXIL) 500 MG capsule        Supportive cares            Video-Visit Details    Type of service:  Video Visit    Video End Time:11:20 AM    Originating Location (pt. Location): Home    Distant Location (provider location):  [facility] [place]     Platform used for Video Visit: [employer]

## 2021-05-19 ASSESSMENT — ANXIETY QUESTIONNAIRES: GAD7 TOTAL SCORE: 2

## 2021-05-25 ENCOUNTER — MYC REFILL (OUTPATIENT)
Dept: PEDIATRICS | Facility: CLINIC | Age: 41
End: 2021-05-25

## 2021-05-25 DIAGNOSIS — F41.1 GAD (GENERALIZED ANXIETY DISORDER): ICD-10-CM

## 2021-05-25 DIAGNOSIS — F32.1 CURRENT MODERATE EPISODE OF MAJOR DEPRESSIVE DISORDER WITHOUT PRIOR EPISODE (H): ICD-10-CM

## 2021-05-27 RX ORDER — BUPROPION HYDROCHLORIDE 150 MG/1
150 TABLET ORAL EVERY MORNING
Qty: 90 TABLET | Refills: 0 | Status: SHIPPED | OUTPATIENT
Start: 2021-05-27 | End: 2021-08-16

## 2021-07-11 DIAGNOSIS — Z30.41 ENCOUNTER FOR SURVEILLANCE OF CONTRACEPTIVE PILLS: ICD-10-CM

## 2021-07-11 NOTE — LETTER
[facility]  [address]  [address]  [place] [address]  Phone: [phone]  Fax: [phone]        July 23, 2021      Yvette Garcia                                                                                                                                [address] [address]            Dear Ms. Garcia,    We have attempted to reach you multiple times because we are concerned about your health care.  We recently provided you with a medication refill.  Many medications require routine follow-up with your Doctor.      At this time we ask that: You schedule an appointment for your annual physical.    Your prescription: Has been refilled for 1 time only so you may have time for the above noted follow-up. You will need to schedule an appointment prior to further refills.     Please call us at [phone] to set up an appointment or if you have any questions or concerns.     Thank you,      [facility] Care Team

## 2021-07-13 RX ORDER — DESOGESTREL AND ETHINYL ESTRADIOL 21-5 (28)
1 KIT ORAL DAILY
Qty: 84 TABLET | Refills: 0 | Status: SHIPPED | OUTPATIENT
Start: 2021-07-13 | End: 2021-08-16

## 2021-07-13 NOTE — TELEPHONE ENCOUNTER
Medication is being filled for 1 time refill only due to:  Patient needs to be seen because it has been more than one year since last visit.  For F2F, needs physcial  Prescription approved per [facility] Refill Protocol.  Routed to  for scheduling  Elaine Fraire RN, BSN  Message handled by CLINIC NURSE.

## 2021-07-13 NOTE — TELEPHONE ENCOUNTER
Called patient and notified she needs an appointment for further refills. Offered to assist in scheduling but patient denied stating she will schedule online.    Lashae Sterling MA 1:15 PM 7/13/2021

## 2021-07-20 NOTE — TELEPHONE ENCOUNTER
2nd attempt: sent pt a reminder through [employer] to schedule.    Lashae Sterling MA 1:25 PM 7/20/2021

## 2021-08-16 ENCOUNTER — OFFICE VISIT (OUTPATIENT)
Dept: PEDIATRICS | Facility: CLINIC | Age: 41
End: 2021-08-16
Payer: COMMERCIAL

## 2021-08-16 VITALS
BODY MASS INDEX: 24.46 KG/M2 | RESPIRATION RATE: 16 BRPM | SYSTOLIC BLOOD PRESSURE: 110 MMHG | OXYGEN SATURATION: 97 % | DIASTOLIC BLOOD PRESSURE: 74 MMHG | HEIGHT: 66 IN | WEIGHT: 152.2 LBS | TEMPERATURE: 98.2 F | HEART RATE: 125 BPM

## 2021-08-16 DIAGNOSIS — Z13.220 SCREENING FOR HYPERLIPIDEMIA: ICD-10-CM

## 2021-08-16 DIAGNOSIS — Z30.41 ENCOUNTER FOR SURVEILLANCE OF CONTRACEPTIVE PILLS: ICD-10-CM

## 2021-08-16 DIAGNOSIS — F32.1 CURRENT MODERATE EPISODE OF MAJOR DEPRESSIVE DISORDER WITHOUT PRIOR EPISODE (H): ICD-10-CM

## 2021-08-16 DIAGNOSIS — Z00.00 ROUTINE GENERAL MEDICAL EXAMINATION AT A HEALTH CARE FACILITY: Primary | ICD-10-CM

## 2021-08-16 DIAGNOSIS — Z11.59 NEED FOR HEPATITIS C SCREENING TEST: ICD-10-CM

## 2021-08-16 DIAGNOSIS — F41.1 GAD (GENERALIZED ANXIETY DISORDER): ICD-10-CM

## 2021-08-16 PROCEDURE — 99214 OFFICE O/P EST MOD 30 MIN: CPT | Mod: 25 | Performed by: INTERNAL MEDICINE

## 2021-08-16 PROCEDURE — 99395 PREV VISIT EST AGE 18-39: CPT | Performed by: INTERNAL MEDICINE

## 2021-08-16 RX ORDER — BUPROPION HYDROCHLORIDE 300 MG/1
300 TABLET ORAL EVERY MORNING
Qty: 90 TABLET | Refills: 1 | Status: SHIPPED | OUTPATIENT
Start: 2021-08-16 | End: 2022-02-04

## 2021-08-16 RX ORDER — DESOGESTREL AND ETHINYL ESTRADIOL 21-5 (28)
1 KIT ORAL DAILY
Qty: 84 TABLET | Refills: 3 | Status: SHIPPED | OUTPATIENT
Start: 2021-08-16 | End: 2022-10-25

## 2021-08-16 RX ORDER — CITALOPRAM HYDROBROMIDE 20 MG/1
30 TABLET ORAL DAILY
Qty: 135 TABLET | Refills: 1 | Status: SHIPPED | OUTPATIENT
Start: 2021-08-16 | End: 2022-02-04

## 2021-08-16 ASSESSMENT — MIFFLIN-ST. JEOR: SCORE: 1387.12

## 2021-08-16 NOTE — PROGRESS NOTES
SUBJECTIVE:   CC: Yvette Garcia is an 40 year old woman who presents for preventive health visit.       Patient has been advised of split billing requirements and indicates understanding:   Healthy Habits:    Getting at least 3 servings of Calcium per day:  Yes    Bi-annual eye exam:  NO    Dental care twice a year:  NO    Sleep apnea or symptoms of sleep apnea:  Daytime drowsiness    Diet:  Regular (no restrictions)    Frequency of exercise:  2-3 days/week    Duration of exercise:  30-45 minutes    Taking medications regularly:  Yes    Barriers to taking medications:  None    Medication side effects:  None    PHQ-2 Total Score:    Additional concerns today:  No          Today's PHQ-2 Score:   PHQ-2 ( 1999 Pf[employer]) 5/18/2021   Q1: Little interest or pleasure in doing things 0   Q2: Feeling down, depressed or hopeless 0   PHQ-2 Score 0   Q1: Little interest or pleasure in doing things Not at all   Q2: Feeling down, depressed or hopeless Not at all   PHQ-2 Score 0       Abuse: Current or Past (Physical, Sexual or Emotional) - No  Do you feel safe in your environment? Yes    Have you ever done Advance Care Planning? (For example, a Health Directive, POLST, or a discussion with a medical provider or your loved ones about your wishes): No, advance care planning information given to patient to review.  Patient plans to discuss their wishes with loved ones or provider.      Social History     Tobacco Use     Smoking status: Never Smoker     Smokeless tobacco: Never Used   Substance Use Topics     Alcohol use: Yes     Alcohol/week: 0.0 standard drinks     Comment: 1 drink weekly      If you drink alcohol do you typically have >3 drinks per day or >7 drinks per week? No    No flowsheet data found.    Reviewed orders with patient.  Reviewed health maintenance and updated orders accordingly - Yes  Lab work is in process    Breast Cancer Screening:  Any new diagnosis of family breast, ovarian, or bowel cancer?  "No    FHS-7: No flowsheet data found.  click delete button to remove this line now  Patient under 40 years of age: Routine Mammogram Screening not recommended.   Pertinent mammograms are reviewed under the imaging tab.    History of abnormal Pap smear: NO - age 30-65 PAP every 5 years with negative HPV co-testing recommended  PAP / HPV Latest Ref Rng & Units 10/24/2019   PAP (Historical) - NIL   HPV16 NEG:Negative Negative   HPV18 NEG:Negative Negative   HRHPV NEG:Negative Negative     Reviewed and updated as needed this visit by clinical staff  Tobacco  Allergies  Meds              Reviewed and updated as needed this visit by Provider                    Review of Systems  All other systems on a 10-point review are negative, unless otherwise noted in HPI       OBJECTIVE:   /74 (BP Location: Right arm, Patient Position: Chair, Cuff Size: Adult Regular)   Pulse (!) 125   Temp 98.2  F (36.8  C) (Tympanic)   Resp 16   Ht 1.676 m (5' 6\")   Wt 69 kg (152 lb 3.2 oz)   LMP 08/02/2021   SpO2 97%   BMI 24.57 kg/m    Physical Exam  GENERAL: healthy, alert and no distress  EYES: Eyes grossly normal to inspection, PERRL and conjunctivae and sclerae normal  HENT: ear canals and TM's normal, nose and mouth without ulcers or lesions  NECK: no adenopathy, no asymmetry, masses, or scars and thyroid normal to palpation  RESP: lungs clear to auscultation - no rales, rhonchi or wheezes  CV: regular rate and rhythm, normal S1 S2, no S3 or S4, no murmur, click or rub, no peripheral edema and peripheral pulses strong  ABDOMEN: soft, nontender, no hepatosplenomegaly, no masses and bowel sounds normal  MS: no gross musculoskeletal defects noted, no edema  SKIN: no suspicious lesions or rashes  NEURO: Normal strength and tone, mentation intact and speech normal  PSYCH: mentation appears normal, affect normal/bright    Diagnostic Test Results:  Labs reviewed in Epic    ASSESSMENT/PLAN:       ICD-10-CM    1. Routine general " "medical examination at a health care facility  Z00.00 Lipid panel reflex to direct LDL Fasting   2. WILFREDO (generalized anxiety disorder)  F41.1 buPROPion (WELLBUTRIN XL) 300 MG 24 hr tablet     citalopram (CELEXA) 20 MG tablet   3. Current moderate episode of major depressive disorder without prior episode (H)       For depression & anxiety, is feeling more fatigued and would like to try a higher dose of bupropion.  Will increase and will monitor for side effects.  Pt to contact me if needed. F32.1 buPROPion (WELLBUTRIN XL) 300 MG 24 hr tablet     citalopram (CELEXA) 20 MG tablet   4. Screening for hyperlipidemia  Z13.220    5. Need for hepatitis C screening test  Z11.59 Hepatitis C Screen Reflex to HCV RNA Quant and Genotype   6. Encounter for surveillance of contraceptive pills  Z30.41 desogestrel-ethinyl estradiol (KARIVA) 0.15-0.02/0.01 MG (21/5) tablet       Patient has been advised of split billing requirements and indicates understanding: No  COUNSELING:  Reviewed preventive health counseling, as reflected in patient instructions    Estimated body mass index is 24.57 kg/m  as calculated from the following:    Height as of this encounter: 1.676 m (5' 6\").    Weight as of this encounter: 69 kg (152 lb 3.2 oz).        She reports that she has never smoked. She has never used smokeless tobacco.      Counseling Resources:  ATP IV Guidelines  Pooled Cohorts Equation Calculator  Breast Cancer Risk Calculator  BRCA-Related Cancer Risk Assessment: FHS-7 Tool  FRAX Risk Assessment  ICSI Preventive Guidelines  Dietary Guidelines for Americans, 2010  USDA's MyPlate  ASA Prophylaxis  Lung CA Screening    Maryuri Jonas MD  [facility] [place]  "

## 2021-08-27 ENCOUNTER — LAB (OUTPATIENT)
Dept: LAB | Facility: CLINIC | Age: 41
End: 2021-08-27
Payer: COMMERCIAL

## 2021-08-27 DIAGNOSIS — Z11.59 NEED FOR HEPATITIS C SCREENING TEST: ICD-10-CM

## 2021-08-27 DIAGNOSIS — Z00.00 ROUTINE GENERAL MEDICAL EXAMINATION AT A HEALTH CARE FACILITY: ICD-10-CM

## 2021-08-27 LAB
CHOLEST SERPL-MCNC: 190 MG/DL
FASTING STATUS PATIENT QL REPORTED: YES
HDLC SERPL-MCNC: 53 MG/DL
LDLC SERPL CALC-MCNC: 107 MG/DL
NONHDLC SERPL-MCNC: 137 MG/DL
TRIGL SERPL-MCNC: 148 MG/DL

## 2021-08-27 PROCEDURE — 86803 HEPATITIS C AB TEST: CPT

## 2021-08-27 PROCEDURE — 36415 COLL VENOUS BLD VENIPUNCTURE: CPT

## 2021-08-27 PROCEDURE — 80061 LIPID PANEL: CPT

## 2021-08-27 NOTE — RESULT ENCOUNTER NOTE
"  Dear Melinda,    The results of your recent lipid (cholesterol) profile were abnormal.  Specifically, your LDL (aka \"bad\" cholesterol) is borderline elevated.     At this time, you DO NOT need to start cholesterol-lowering medications.  Instead, you can reduce your risk of heart attack and stroke by controlling the amount and type of fat you eat and by increasing your daily activity level.     Here are some ways to improve your nutrition:  - Eat less fat (especially butter, Crisco and other saturated fats)  - Buy lean cuts of meat, reduce your portions of red meat or substitute poultry or fish  - Eat no more than 4 egg yolks per week  - Avoid fried or fast foods that are high in fat  - Eat more fruits and vegetables  - Reduce the percent of calories from saturated fat and trans fat (to less than 5-10% of total calories consumed)  - Limits intake of sweets, sugar-sweetened beverages, and red meats  - Increase intake of leafy green vegetables, fruits, and whole grains.  - A healthy balanced diet can include low-fat dairy products, poultry, fish, legumes, nontropical vegetable oils, and nuts    Also consider starting or increasing your aerobic activity. Aerobic activity is the best way to improve HDL (good) cholesterol.   - 3 or 4 physical activity sessions/week  - Average duration 40 minutes/session  - Activity should be moderate-to-vigorous intensity (I recommend a mixture of cardiovascular and strength training).        Sincerely,    Maryuri Jonas MD      "

## 2021-08-30 LAB — HCV AB SERPL QL IA: NONREACTIVE

## 2021-10-02 ENCOUNTER — HEALTH MAINTENANCE LETTER (OUTPATIENT)
Age: 41
End: 2021-10-02

## 2021-12-22 ENCOUNTER — VIRTUAL VISIT (OUTPATIENT)
Dept: FAMILY MEDICINE | Facility: CLINIC | Age: 41
End: 2021-12-22
Payer: COMMERCIAL

## 2021-12-22 DIAGNOSIS — K76.9 LIVER LESION: ICD-10-CM

## 2021-12-22 DIAGNOSIS — R10.11 RIGHT UPPER QUADRANT PAIN: Primary | ICD-10-CM

## 2021-12-22 PROCEDURE — 99214 OFFICE O/P EST MOD 30 MIN: CPT | Mod: 95 | Performed by: PHYSICIAN ASSISTANT
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
## 2021-12-23 ENCOUNTER — LAB (OUTPATIENT)
Dept: LAB | Facility: CLINIC | Age: 41
End: 2021-12-23
Payer: COMMERCIAL

## 2021-12-23 DIAGNOSIS — R10.11 RIGHT UPPER QUADRANT PAIN: ICD-10-CM

## 2021-12-23 LAB
ALBUMIN SERPL-MCNC: 3.5 G/DL (ref 3.4–5)
ALP SERPL-CCNC: 84 U/L (ref 40–150)
ALT SERPL W P-5'-P-CCNC: 53 U/L (ref 0–50)
AMYLASE SERPL-CCNC: 33 U/L (ref 30–110)
ANION GAP SERPL CALCULATED.3IONS-SCNC: 5 MMOL/L (ref 3–14)
AST SERPL W P-5'-P-CCNC: 23 U/L (ref 0–45)
BASOPHILS # BLD AUTO: 0 10E3/UL (ref 0–0.2)
BASOPHILS NFR BLD AUTO: 0 %
BILIRUB SERPL-MCNC: 0.3 MG/DL (ref 0.2–1.3)
BUN SERPL-MCNC: 8 MG/DL (ref 7–30)
CALCIUM SERPL-MCNC: 9.1 MG/DL (ref 8.5–10.1)
CHLORIDE BLD-SCNC: 105 MMOL/L (ref 94–109)
CO2 SERPL-SCNC: 27 MMOL/L (ref 20–32)
CREAT SERPL-MCNC: 0.91 MG/DL (ref 0.52–1.04)
EOSINOPHIL # BLD AUTO: 0.1 10E3/UL (ref 0–0.7)
EOSINOPHIL NFR BLD AUTO: 1 %
ERYTHROCYTE [DISTWIDTH] IN BLOOD BY AUTOMATED COUNT: 11.9 % (ref 10–15)
GFR SERPL CREATININE-BSD FRML MDRD: 82 ML/MIN/1.73M2
GLUCOSE BLD-MCNC: 109 MG/DL (ref 70–99)
HCT VFR BLD AUTO: 43.9 % (ref 35–47)
HGB BLD-MCNC: 14.3 G/DL (ref 11.7–15.7)
IMM GRANULOCYTES # BLD: 0 10E3/UL
IMM GRANULOCYTES NFR BLD: 0 %
LIPASE SERPL-CCNC: 122 U/L (ref 73–393)
LYMPHOCYTES # BLD AUTO: 2.7 10E3/UL (ref 0.8–5.3)
LYMPHOCYTES NFR BLD AUTO: 32 %
MCH RBC QN AUTO: 30.6 PG (ref 26.5–33)
MCHC RBC AUTO-ENTMCNC: 32.6 G/DL (ref 31.5–36.5)
MCV RBC AUTO: 94 FL (ref 78–100)
MONOCYTES # BLD AUTO: 0.5 10E3/UL (ref 0–1.3)
MONOCYTES NFR BLD AUTO: 6 %
NEUTROPHILS # BLD AUTO: 5.3 10E3/UL (ref 1.6–8.3)
NEUTROPHILS NFR BLD AUTO: 61 %
NRBC # BLD AUTO: 0 10E3/UL
NRBC BLD AUTO-RTO: 0 /100
PLATELET # BLD AUTO: 343 10E3/UL (ref 150–450)
POTASSIUM BLD-SCNC: 3.8 MMOL/L (ref 3.4–5.3)
PROT SERPL-MCNC: 7.3 G/DL (ref 6.8–8.8)
RBC # BLD AUTO: 4.67 10E6/UL (ref 3.8–5.2)
SODIUM SERPL-SCNC: 137 MMOL/L (ref 133–144)
WBC # BLD AUTO: 8.7 10E3/UL (ref 4–11)

## 2021-12-23 PROCEDURE — 82150 ASSAY OF AMYLASE: CPT

## 2021-12-23 PROCEDURE — 83690 ASSAY OF LIPASE: CPT

## 2021-12-23 PROCEDURE — 80053 COMPREHEN METABOLIC PANEL: CPT

## 2021-12-23 PROCEDURE — 85025 COMPLETE CBC W/AUTO DIFF WBC: CPT

## 2021-12-23 PROCEDURE — 36415 COLL VENOUS BLD VENIPUNCTURE: CPT

## 2021-12-24 ENCOUNTER — HOSPITAL ENCOUNTER (OUTPATIENT)
Dept: ULTRASOUND IMAGING | Facility: CLINIC | Age: 41
Discharge: HOME | End: 2021-12-24
Attending: PHYSICIAN ASSISTANT | Admitting: PHYSICIAN ASSISTANT
Payer: COMMERCIAL

## 2021-12-24 DIAGNOSIS — R10.11 RIGHT UPPER QUADRANT PAIN: ICD-10-CM

## 2021-12-24 PROCEDURE — 76700 US EXAM ABDOM COMPLETE: CPT

## 2021-12-24 NOTE — RESULT ENCOUNTER NOTE
Melinda,     One of your liver enzymes was mildly elevated but nothing specific for gallbladder disease. Your pancreatic enzymes are normal.   We can see what your ultrasound shows today. If your pain worsens over the weekend please be seen in urgent care or the ER.   Cuca Rasheed PA-C

## 2021-12-24 NOTE — RESULT ENCOUNTER NOTE
Melinda,     The gallbladder and pancreas look good on ultrasound. They did incidentally find some lesions in the liver that are likely benign, however they are recommend a repeat ultrasound in 6 months to check that these are stable.   If the abdominal pain is not improving over the weekend please follow up for an in clinic appointment to reassess your pain. If it worsens over the weekend please be seen in urgent care or the ER.   Cuca Rasheed PA-C

## 2022-02-03 ASSESSMENT — ANXIETY QUESTIONNAIRES
GAD7 TOTAL SCORE: 0
5. BEING SO RESTLESS THAT IT IS HARD TO SIT STILL: NOT AT ALL
7. FEELING AFRAID AS IF SOMETHING AWFUL MIGHT HAPPEN: NOT AT ALL
7. FEELING AFRAID AS IF SOMETHING AWFUL MIGHT HAPPEN: NOT AT ALL
GAD7 TOTAL SCORE: 0
GAD7 TOTAL SCORE: 0
4. TROUBLE RELAXING: NOT AT ALL
3. WORRYING TOO MUCH ABOUT DIFFERENT THINGS: NOT AT ALL
6. BECOMING EASILY ANNOYED OR IRRITABLE: NOT AT ALL
1. FEELING NERVOUS, ANXIOUS, OR ON EDGE: NOT AT ALL
2. NOT BEING ABLE TO STOP OR CONTROL WORRYING: NOT AT ALL

## 2022-02-03 ASSESSMENT — PATIENT HEALTH QUESTIONNAIRE - PHQ9
SUM OF ALL RESPONSES TO PHQ QUESTIONS 1-9: 5
10. IF YOU CHECKED OFF ANY PROBLEMS, HOW DIFFICULT HAVE THESE PROBLEMS MADE IT FOR YOU TO DO YOUR WORK, TAKE CARE OF THINGS AT HOME, OR GET ALONG WITH OTHER PEOPLE: SOMEWHAT DIFFICULT
SUM OF ALL RESPONSES TO PHQ QUESTIONS 1-9: 5

## 2022-02-04 ENCOUNTER — VIRTUAL VISIT (OUTPATIENT)
Dept: PEDIATRICS | Facility: CLINIC | Age: 42
End: 2022-02-04
Payer: COMMERCIAL

## 2022-02-04 DIAGNOSIS — F41.1 GAD (GENERALIZED ANXIETY DISORDER): ICD-10-CM

## 2022-02-04 DIAGNOSIS — F32.1 CURRENT MODERATE EPISODE OF MAJOR DEPRESSIVE DISORDER WITHOUT PRIOR EPISODE (H): ICD-10-CM

## 2022-02-04 PROCEDURE — 99213 OFFICE O/P EST LOW 20 MIN: CPT | Mod: 95 | Performed by: INTERNAL MEDICINE

## 2022-02-04 RX ORDER — CITALOPRAM HYDROBROMIDE 20 MG/1
30 TABLET ORAL DAILY
Qty: 135 TABLET | Refills: 1 | Status: SHIPPED | OUTPATIENT
Start: 2022-02-04 | End: 2022-10-25

## 2022-02-04 RX ORDER — BUPROPION HYDROCHLORIDE 300 MG/1
300 TABLET ORAL EVERY MORNING
Qty: 90 TABLET | Refills: 1 | Status: SHIPPED | OUTPATIENT
Start: 2022-02-04 | End: 2022-10-25

## 2022-02-04 ASSESSMENT — PATIENT HEALTH QUESTIONNAIRE - PHQ9: SUM OF ALL RESPONSES TO PHQ QUESTIONS 1-9: 5

## 2022-02-04 ASSESSMENT — ANXIETY QUESTIONNAIRES: GAD7 TOTAL SCORE: 0

## 2022-02-04 NOTE — PROGRESS NOTES
Melinda is a 41 year old who is being evaluated via a billable video visit.      Video Start Time: 3:03 PM    Assessment & Plan     Current moderate episode of major depressive disorder without prior episode (H)  Stable, doing well on current dose (we increased dose at last visit).  No side effects, however minimal benefit.  Pt-centered decision making to continue current higher dose, as she is tolerating this well.  - buPROPion (WELLBUTRIN XL) 300 MG 24 hr tablet; Take 1 tablet (300 mg) by mouth every morning  - citalopram (CELEXA) 20 MG tablet; Take 1.5 tablets (30 mg) by mouth daily    WILFREDO (generalized anxiety disorder)  - Stable, no side effects with medications, refilled meds today  - buPROPion (WELLBUTRIN XL) 300 MG 24 hr tablet; Take 1 tablet (300 mg) by mouth every morning  - citalopram (CELEXA) 20 MG tablet; Take 1.5 tablets (30 mg) by mouth daily         There are no Patient Instructions on file for this visit.    Return in about 6 months (around 8/4/2022) for Preventive Visit.    Maryuri Jonas MD  [facility] [place] is a 41 year old who presents for the following health issues     History of Present Illness       Mental Health Follow-up:  Patient presents to follow-up on Depression & Anxiety.Patient's depression since last visit has been:  Good  The patient is having other symptoms associated with depression.  Patient's anxiety since last visit has been:  Good  The patient is not having other symptoms associated with anxiety.  Any significant life events: No  Patient is not feeling anxious or having panic attacks.  Patient has no concerns about alcohol or drug use.     Social History  Tobacco Use    Smoking status: Never Smoker    Smokeless tobacco: Never Used  Alcohol use: Yes    Alcohol/week: 0.0 standard drinks    Comment: 1 drink weekly   Drug use: No      Today's PHQ-9         PHQ-9 Total Score:     (P) 5   PHQ-9 Q9 Thoughts of better off dead/self-harm past 2  weeks :   (P) Not at all   Thoughts of suicide or self harm:      Self-harm Plan:        Self-harm Action:          Safety concerns for self or others:           She eats 2-3 servings of fruits and vegetables daily.She consumes 0 sweetened beverage(s) daily.She exercises with enough effort to increase her heart rate 30 to 60 minutes per day.  She exercises with enough effort to increase her heart rate 4 days per week.   She is taking medications regularly.     Answers for HPI/ROS submitted by the patient on 2/3/2022  If you checked off any problems, how difficult have these problems made it for you to do your work, take care of things at home, or get along with other people?: Somewhat difficult  PHQ9 TOTAL SCORE: 5  WILFREDO 7 TOTAL SCORE: 0    No side effects on higher dose of bupropion.  No sleep disturbances.  Didn't notice much of a benefit when we increased the dose.      Review of Systems   All other systems on a 10-point review are negative, unless otherwise noted in HPI        Objective           Vitals:  No vitals were obtained today due to virtual visit.    Physical Exam   GENERAL: Healthy, alert and no distress  EYES: Eyes grossly normal to inspection.  No discharge or erythema, or obvious scleral/conjunctival abnormalities.  RESP: No audible wheeze, cough, or visible cyanosis.  No visible retractions or increased work of breathing.    SKIN: Visible skin clear. No significant rash, abnormal pigmentation or lesions.  NEURO: Cranial nerves grossly intact.  Mentation and speech appropriate for age.  PSYCH: Mentation appears normal, affect normal/bright, judgement and insight intact, normal speech and appearance well-groomed.            Video-Visit Details    Type of service:  Video Visit    Video End Time:3:15 PM    Originating Location (pt. Location): Home    Distant Location (provider location):  [facility] [place]     Platform used for Video Visit: [employer]

## 2022-06-29 ENCOUNTER — TRANSFERRED RECORDS (OUTPATIENT)
Dept: PEDIATRICS | Facility: CLINIC | Age: 42
End: 2022-06-29

## 2022-07-11 ENCOUNTER — ANCILLARY PROCEDURE (OUTPATIENT)
Dept: ULTRASOUND IMAGING | Facility: CLINIC | Age: 42
End: 2022-07-11
Attending: PHYSICIAN ASSISTANT
Payer: COMMERCIAL

## 2022-07-11 DIAGNOSIS — K76.9 LIVER LESION: ICD-10-CM

## 2022-07-11 PROCEDURE — 76705 ECHO EXAM OF ABDOMEN: CPT

## 2022-07-11 NOTE — RESULT ENCOUNTER NOTE
Your abdominal ultrasound was normal. If your symptoms continue then please follow up.   Cuca Rasheed PA-C

## 2022-09-03 ENCOUNTER — HEALTH MAINTENANCE LETTER (OUTPATIENT)
Age: 42
End: 2022-09-03

## 2022-10-25 ENCOUNTER — OFFICE VISIT (OUTPATIENT)
Dept: PEDIATRICS | Facility: CLINIC | Age: 42
End: 2022-10-25
Payer: COMMERCIAL

## 2022-10-25 VITALS
BODY MASS INDEX: 23.95 KG/M2 | OXYGEN SATURATION: 100 % | TEMPERATURE: 98.4 F | WEIGHT: 149 LBS | HEIGHT: 66 IN | RESPIRATION RATE: 16 BRPM | HEART RATE: 108 BPM | DIASTOLIC BLOOD PRESSURE: 72 MMHG | SYSTOLIC BLOOD PRESSURE: 104 MMHG

## 2022-10-25 DIAGNOSIS — Z30.41 ENCOUNTER FOR SURVEILLANCE OF CONTRACEPTIVE PILLS: ICD-10-CM

## 2022-10-25 DIAGNOSIS — Z00.00 ROUTINE GENERAL MEDICAL EXAMINATION AT A HEALTH CARE FACILITY: Primary | ICD-10-CM

## 2022-10-25 DIAGNOSIS — F41.1 GAD (GENERALIZED ANXIETY DISORDER): ICD-10-CM

## 2022-10-25 DIAGNOSIS — F32.1 CURRENT MODERATE EPISODE OF MAJOR DEPRESSIVE DISORDER WITHOUT PRIOR EPISODE (H): ICD-10-CM

## 2022-10-25 DIAGNOSIS — R53.82 CHRONIC FATIGUE: ICD-10-CM

## 2022-10-25 PROCEDURE — 90471 IMMUNIZATION ADMIN: CPT | Performed by: INTERNAL MEDICINE

## 2022-10-25 PROCEDURE — 99395 PREV VISIT EST AGE 18-39: CPT | Mod: 25 | Performed by: INTERNAL MEDICINE

## 2022-10-25 PROCEDURE — 91312 COVID-19,PF,PFIZER BOOSTER BIVALENT: CPT | Performed by: INTERNAL MEDICINE

## 2022-10-25 PROCEDURE — 90686 IIV4 VACC NO PRSV 0.5 ML IM: CPT | Performed by: INTERNAL MEDICINE

## 2022-10-25 PROCEDURE — 0124A COVID-19,PF,PFIZER BOOSTER BIVALENT: CPT | Performed by: INTERNAL MEDICINE

## 2022-10-25 PROCEDURE — 99214 OFFICE O/P EST MOD 30 MIN: CPT | Mod: 25 | Performed by: INTERNAL MEDICINE

## 2022-10-25 PROCEDURE — 96127 BRIEF EMOTIONAL/BEHAV ASSMT: CPT | Performed by: INTERNAL MEDICINE

## 2022-10-25 RX ORDER — CITALOPRAM HYDROBROMIDE 20 MG/1
TABLET ORAL
Qty: 135 TABLET | Refills: 1 | Status: SHIPPED | OUTPATIENT
Start: 2022-10-25 | End: 2022-11-01

## 2022-10-25 RX ORDER — BUPROPION HYDROCHLORIDE 300 MG/1
300 TABLET ORAL EVERY MORNING
Qty: 90 TABLET | Refills: 3 | Status: SHIPPED | OUTPATIENT
Start: 2022-10-25 | End: 2023-10-04

## 2022-10-25 RX ORDER — DESOGESTREL AND ETHINYL ESTRADIOL 21-5 (28)
1 KIT ORAL DAILY
Qty: 84 TABLET | Refills: 3 | Status: SHIPPED | OUTPATIENT
Start: 2022-10-25 | End: 2022-12-08

## 2022-10-25 SDOH — ECONOMIC STABILITY: INCOME INSECURITY: HOW HARD IS IT FOR YOU TO PAY FOR THE VERY BASICS LIKE FOOD, HOUSING, MEDICAL CARE, AND HEATING?: NOT HARD AT ALL

## 2022-10-25 SDOH — ECONOMIC STABILITY: TRANSPORTATION INSECURITY
IN THE PAST 12 MONTHS, HAS LACK OF TRANSPORTATION KEPT YOU FROM MEETINGS, WORK, OR FROM GETTING THINGS NEEDED FOR DAILY LIVING?: NO

## 2022-10-25 SDOH — HEALTH STABILITY: PHYSICAL HEALTH: ON AVERAGE, HOW MANY DAYS PER WEEK DO YOU ENGAGE IN MODERATE TO STRENUOUS EXERCISE (LIKE A BRISK WALK)?: 3 DAYS

## 2022-10-25 SDOH — HEALTH STABILITY: PHYSICAL HEALTH: ON AVERAGE, HOW MANY MINUTES DO YOU ENGAGE IN EXERCISE AT THIS LEVEL?: 50 MIN

## 2022-10-25 SDOH — ECONOMIC STABILITY: FOOD INSECURITY: WITHIN THE PAST 12 MONTHS, YOU WORRIED THAT YOUR FOOD WOULD RUN OUT BEFORE YOU GOT MONEY TO BUY MORE.: NEVER TRUE

## 2022-10-25 SDOH — ECONOMIC STABILITY: FOOD INSECURITY: WITHIN THE PAST 12 MONTHS, THE FOOD YOU BOUGHT JUST DIDN'T LAST AND YOU DIDN'T HAVE MONEY TO GET MORE.: NEVER TRUE

## 2022-10-25 SDOH — ECONOMIC STABILITY: TRANSPORTATION INSECURITY
IN THE PAST 12 MONTHS, HAS THE LACK OF TRANSPORTATION KEPT YOU FROM MEDICAL APPOINTMENTS OR FROM GETTING MEDICATIONS?: NO

## 2022-10-25 SDOH — ECONOMIC STABILITY: INCOME INSECURITY: IN THE LAST 12 MONTHS, WAS THERE A TIME WHEN YOU WERE NOT ABLE TO PAY THE MORTGAGE OR RENT ON TIME?: NO

## 2022-10-25 ASSESSMENT — ENCOUNTER SYMPTOMS
FREQUENCY: 0
BREAST MASS: 0
DIZZINESS: 0
HEADACHES: 0
HEMATOCHEZIA: 0
WEAKNESS: 0
HEMATURIA: 0
PARESTHESIAS: 0
ARTHRALGIAS: 0
ABDOMINAL PAIN: 0
CONSTIPATION: 0
MYALGIAS: 0
SHORTNESS OF BREATH: 0
JOINT SWELLING: 0
HEARTBURN: 0
SORE THROAT: 0
CHILLS: 0
FEVER: 0
NERVOUS/ANXIOUS: 0
COUGH: 0
NAUSEA: 0
DYSURIA: 0
PALPITATIONS: 0
EYE PAIN: 0
DIARRHEA: 0

## 2022-10-25 ASSESSMENT — PATIENT HEALTH QUESTIONNAIRE - PHQ9
SUM OF ALL RESPONSES TO PHQ QUESTIONS 1-9: 5
SUM OF ALL RESPONSES TO PHQ QUESTIONS 1-9: 5
10. IF YOU CHECKED OFF ANY PROBLEMS, HOW DIFFICULT HAVE THESE PROBLEMS MADE IT FOR YOU TO DO YOUR WORK, TAKE CARE OF THINGS AT HOME, OR GET ALONG WITH OTHER PEOPLE: VERY DIFFICULT

## 2022-10-25 ASSESSMENT — LIFESTYLE VARIABLES
HOW MANY STANDARD DRINKS CONTAINING ALCOHOL DO YOU HAVE ON A TYPICAL DAY: 1 OR 2
SKIP TO QUESTIONS 9-10: 1
HOW OFTEN DO YOU HAVE A DRINK CONTAINING ALCOHOL: 2-4 TIMES A MONTH
HOW OFTEN DO YOU HAVE SIX OR MORE DRINKS ON ONE OCCASION: NEVER
AUDIT-C TOTAL SCORE: 2

## 2022-10-25 ASSESSMENT — SOCIAL DETERMINANTS OF HEALTH (SDOH)
IN A TYPICAL WEEK, HOW MANY TIMES DO YOU TALK ON THE PHONE WITH FAMILY, FRIENDS, OR NEIGHBORS?: TWICE A WEEK
HOW OFTEN DO YOU GET TOGETHER WITH FRIENDS OR RELATIVES?: TWICE A WEEK
DO YOU BELONG TO ANY CLUBS OR ORGANIZATIONS SUCH AS CHURCH GROUPS UNIONS, FRATERNAL OR ATHLETIC GROUPS, OR SCHOOL GROUPS?: YES
HOW OFTEN DO YOU ATTEND CHURCH OR RELIGIOUS SERVICES?: 1 TO 4 TIMES PER YEAR

## 2022-10-25 ASSESSMENT — PAIN SCALES - GENERAL: PAINLEVEL: NO PAIN (0)

## 2022-10-25 NOTE — PROGRESS NOTES
SUBJECTIVE:   CC: Melinda is an 41 year old who presents for preventive health visit.       Patient has been advised of split billing requirements and indicates understanding: Yes  Healthy Habits:     Getting at least 3 servings of Calcium per day:  Yes    Bi-annual eye exam:  Yes    Dental care twice a year:  Yes    Sleep apnea or symptoms of sleep apnea:  Daytime drowsiness and Excessive snoring    Diet:  Regular (no restrictions)    Frequency of exercise:  2-3 days/week    Duration of exercise:  30-45 minutes    Taking medications regularly:  Yes    Barriers to taking medications:  None    Medication side effects:  None    PHQ-2 Total Score: 0    Additional concerns today:  No    Yes - discuss ongoing chronic fatigue.  We have worked this up with normal labs in the past.  She is concerned - feels this is worse than her peers.  Reports improvement on bupropion, but still feels tired all the time.  Feels she falls asleep easily during daytime, regardless of how many hours of sleep she gets.  Feels she is never fully rested.  Reports snoring.    Today's PHQ-2 Score: 0  PHQ-2 ( 1999 [employer]) 10/25/2022   Q1: Little interest or pleasure in doing things 0   Q2: Feeling down, depressed or hopeless 0   PHQ-2 Score 0   PHQ-2 Total Score (12-17 Years)- Positive if 3 or more points; Administer PHQ-A if positive -   Q1: Little interest or pleasure in doing things Not at all   Q2: Feeling down, depressed or hopeless Not at all   PHQ-2 Score 0       Abuse: Current or Past (Physical, Sexual or Emotional) - No  Do you feel safe in your environment? Yes        Social History     Tobacco Use     Smoking status: Never     Smokeless tobacco: Never   Substance Use Topics     Alcohol use: Yes     Alcohol/week: 0.0 standard drinks     Comment: 1 drink weekly      If you drink alcohol do you typically have >3 drinks per day or >7 drinks per week? No    Alcohol Use 10/25/2022   Prescreen: >3 drinks/day or >7 drinks/week? No   Prescreen:  >3 drinks/day or >7 drinks/week? -   No flowsheet data found.    Reviewed orders with patient.  Reviewed health maintenance and updated orders accordingly - Yes  Labs reviewed in EPIC    Breast Cancer Screening:    FHS-7:   Breast CA Risk Assessment (FHS-7) 10/25/2022   Did any of your first-degree relatives have breast or ovarian cancer? No   Did any of your relatives have bilateral breast cancer? No   Did any man in your family have breast cancer? No   Did any woman in your family have breast and ovarian cancer? No   Did any woman in your family have breast cancer before age 50 y? No   Do you have 2 or more relatives with breast and/or ovarian cancer? Yes   Do you have 2 or more relatives with breast and/or bowel cancer? No       Patient under 40 years of age: Routine Mammogram Screening not recommended.   Pertinent mammograms are reviewed under the imaging tab.    History of abnormal Pap smear: NO - age 30-65 PAP every 5 years with negative HPV co-testing recommended  PAP / HPV Latest Ref Rng & Units 10/24/2019   PAP (Historical) - NIL   HPV16 NEG:Negative Negative   HPV18 NEG:Negative Negative   HRHPV NEG:Negative Negative     Reviewed and updated as needed this visit by clinical staff   Tobacco  Allergies  Meds              Reviewed and updated as needed this visit by Provider                     Review of Systems   Constitutional: Negative for chills and fever.   HENT: Negative for congestion, ear pain, hearing loss and sore throat.    Eyes: Negative for pain and visual disturbance.   Respiratory: Negative for cough and shortness of breath.    Cardiovascular: Negative for chest pain, palpitations and peripheral edema.   Gastrointestinal: Negative for abdominal pain, constipation, diarrhea, heartburn, hematochezia and nausea.   Breasts:  Negative for tenderness, breast mass and discharge.   Genitourinary: Negative for dysuria, frequency, genital sores, hematuria, pelvic pain, urgency, vaginal bleeding and  "vaginal discharge.   Musculoskeletal: Negative for arthralgias, joint swelling and myalgias.   Skin: Negative for rash.   Neurological: Negative for dizziness, weakness, headaches and paresthesias.   Psychiatric/Behavioral: Negative for mood changes. The patient is not nervous/anxious.           OBJECTIVE:   /72   Pulse 108   Temp 98.4  F (36.9  C) (Oral)   Resp 16   Ht 1.676 m (5' 6\")   Wt 67.6 kg (149 lb)   LMP 10/10/2022   SpO2 100%   BMI 24.05 kg/m    Physical Exam  GENERAL: healthy, alert and no distress  EYES: Eyes grossly normal to inspection, PERRL and conjunctivae and sclerae normal  HENT: ear canals and TM's normal, nose and mouth without ulcers or lesions  NECK: no adenopathy, no asymmetry, masses, or scars and thyroid normal to palpation  RESP: lungs clear to auscultation - no rales, rhonchi or wheezes  CV: regular rate and rhythm, normal S1 S2, no S3 or S4, no murmur, click or rub, no peripheral edema and peripheral pulses strong  ABDOMEN: soft, nontender, no hepatosplenomegaly, no masses and bowel sounds normal  MS: no gross musculoskeletal defects noted, no edema  SKIN: no suspicious lesions or rashes  NEURO: Normal strength and tone, mentation intact and speech normal  PSYCH: mentation appears normal, affect normal/bright    Diagnostic Test Results:  Labs reviewed in Epic    ASSESSMENT/PLAN:       ICD-10-CM    1. Routine general medical examination at a health care facility  Z00.00       2. Current moderate episode of major depressive disorder without prior episode (H)   - Although pt reporting stable/well-controlled anxiety/depression, due to chronic fatigue, will trial switch from citalopram to prozac, to see if this helps with fatigue.  - Cross taper per PI  - [employer] message in 3-4 weeks for refills of prozac 40 mg  - f/up via video visit in 8 weeks. F32.1 citalopram (CELEXA) 20 MG tablet     FLUoxetine (PROZAC) 20 MG capsule     buPROPion (WELLBUTRIN XL) 300 MG 24 hr tablet      3. " "WILFREDO (generalized anxiety disorder)   - see #2 F41.1 citalopram (CELEXA) 20 MG tablet     FLUoxetine (PROZAC) 20 MG capsule     buPROPion (WELLBUTRIN XL) 300 MG 24 hr tablet      4. Chronic fatigue   - In addition to trial of prozac, will refer to sleep medicine for excessive daytime sleepiness R53.82 Adult Sleep Eval & Management  Referral      5. Encounter for surveillance of contraceptive pills  Z30.41 desogestrel-ethinyl estradiol (KARIVA) 0.15-0.02/0.01 MG (21/5) tablet                COUNSELING:  Reviewed preventive health counseling, as reflected in patient instructions    Estimated body mass index is 24.05 kg/m  as calculated from the following:    Height as of this encounter: 1.676 m (5' 6\").    Weight as of this encounter: 67.6 kg (149 lb).        She reports that she has never smoked. She has never used smokeless tobacco.      Counseling Resources:  ATP IV Guidelines  Pooled Cohorts Equation Calculator  Breast Cancer Risk Calculator  BRCA-Related Cancer Risk Assessment: FHS-7 Tool  FRAX Risk Assessment  ICSI Preventive Guidelines  Dietary Guidelines for Americans, 2010  RIWI's MyPlate  ASA Prophylaxis  Lung CA Screening    Maryuri Jonas MD  [facility] ANGÉLICA  Answers for HPI/ROS submitted by the patient on 10/25/2022  If you checked off any problems, how difficult have these problems made it for you to do your work, take care of things at home, or get along with other people?: Very difficult  PHQ9 TOTAL SCORE: 5      "

## 2022-10-25 NOTE — PATIENT INSTRUCTIONS
Cross taper off citalopram and start prozac.  Will refer to sleep medicine for excessive daytime fatigue.  Send me a [employer] message in 3-4 weeks with update on your dose and how things are going.  F/up via video visit in 8 weeks    Preventive Health Recommendations  Female Ages 26 - 39  Yearly exam:   See your health care provider every year in order to  Review health changes.   Discuss preventive care.    Review your medicines if you your doctor has prescribed any.    Until age 30: Get a Pap test every three years (more often if you have had an abnormal result).    After age 30: Talk to your doctor about whether you should have a Pap test every 3 years or have a Pap test with HPV screening every 5 years.   You do not need a Pap test if your uterus was removed (hysterectomy) and you have not had cancer.  You should be tested each year for STDs (sexually transmitted diseases), if you're at risk.   Talk to your provider about how often to have your cholesterol checked.  If you are at risk for diabetes, you should have a diabetes test (fasting glucose).  Shots: Get a flu shot each year. Get a tetanus shot every 10 years.   Nutrition:   Eat at least 5 servings of fruits and vegetables each day.  Eat whole-grain bread, whole-wheat pasta and brown rice instead of white grains and rice.  Get adequate Calcium and Vitamin D.     Lifestyle  Exercise at least 150 minutes a week (30 minutes a day, 5 days of the week). This will help you control your weight and prevent disease.  Limit alcohol to one drink per day.  No smoking.   Wear sunscreen to prevent skin cancer.  See your dentist every six months for an exam and cleaning.

## 2022-11-01 ENCOUNTER — TELEPHONE (OUTPATIENT)
Dept: PEDIATRICS | Facility: CLINIC | Age: 42
End: 2022-11-01

## 2022-11-01 DIAGNOSIS — F32.1 CURRENT MODERATE EPISODE OF MAJOR DEPRESSIVE DISORDER WITHOUT PRIOR EPISODE (H): ICD-10-CM

## 2022-11-01 DIAGNOSIS — F41.1 GAD (GENERALIZED ANXIETY DISORDER): ICD-10-CM

## 2022-11-01 NOTE — TELEPHONE ENCOUNTER
Received call from [facility] requesting clarification on citalopram rx. Per instructions:     citalopram (CELEXA) 20 MG tablet 135 tablet 1 10/25/2022 11/8/2022 No   Sig - Route: Take 1 tablet (20 mg) by mouth daily for 7 days, THEN 0.5 tablets (10 mg) daily for 7 days. - Oral     Per OV notes 10/25/22:    Current moderate episode of major depressive disorder without prior episode (H)   - Although pt reporting stable/well-controlled anxiety/depression, due to chronic fatigue, will trial switch from citalopram to prozac, to see if this helps with fatigue.  - Cross taper per PI  - Mychart message in 3-4 weeks for refills of prozac 40 mg     Pharmacy inquiring why qty of 135 tablets is needed and if there should be refill on file. Please review and advise.    Call back to pharmacy for clarification: [phone]    Kal COLEMAN RN 11/1/2022 at 11:03 AM

## 2022-11-02 RX ORDER — CITALOPRAM HYDROBROMIDE 20 MG/1
TABLET ORAL
Qty: 11 TABLET | Refills: 0 | Status: SHIPPED | OUTPATIENT
Start: 2022-11-02 | End: 2023-01-06

## 2022-11-02 NOTE — TELEPHONE ENCOUNTER
New prescription sent.    Quin Alegre MD  Internal Medicine/Pediatrics  [facility]

## 2022-11-03 DIAGNOSIS — F32.1 CURRENT MODERATE EPISODE OF MAJOR DEPRESSIVE DISORDER WITHOUT PRIOR EPISODE (H): ICD-10-CM

## 2022-11-03 DIAGNOSIS — F41.1 GAD (GENERALIZED ANXIETY DISORDER): ICD-10-CM

## 2022-11-23 ENCOUNTER — TELEPHONE (OUTPATIENT)
Dept: PEDIATRICS | Facility: CLINIC | Age: 42
End: 2022-11-23

## 2022-11-23 NOTE — TELEPHONE ENCOUNTER
Reason for Call:  Other:  [facility] calling, callers name is Vidya  Detailed comments: She needs clarification on the Fluoxitine 40 mgs that was prescribed.  Are the 40 mgs for Fluoxetine replacing the Citalapram?    Phone Number Patient can be reached at: [phone] is the pharmacy number you can speak to anyone.    Best Time: 7am-7pm    Can we leave a detailed message on this number? Yes     Call taken on 11/23/2022 at 5:47 PM by Camila Dinero

## 2022-12-02 NOTE — PROGRESS NOTES
Called Pt she was driving she said she would have to check her schedule before scheduling.    Honey Lofton MA

## 2023-01-06 ENCOUNTER — VIRTUAL VISIT (OUTPATIENT)
Dept: SLEEP MEDICINE | Facility: CLINIC | Age: 43
End: 2023-01-06
Attending: INTERNAL MEDICINE
Payer: COMMERCIAL

## 2023-01-06 ENCOUNTER — VIRTUAL VISIT (OUTPATIENT)
Dept: PEDIATRICS | Facility: CLINIC | Age: 43
End: 2023-01-06
Payer: COMMERCIAL

## 2023-01-06 VITALS — WEIGHT: 145 LBS | BODY MASS INDEX: 23.3 KG/M2 | HEIGHT: 66 IN

## 2023-01-06 DIAGNOSIS — F41.1 GAD (GENERALIZED ANXIETY DISORDER): ICD-10-CM

## 2023-01-06 DIAGNOSIS — R06.83 SNORING: ICD-10-CM

## 2023-01-06 DIAGNOSIS — F32.1 CURRENT MODERATE EPISODE OF MAJOR DEPRESSIVE DISORDER WITHOUT PRIOR EPISODE (H): ICD-10-CM

## 2023-01-06 DIAGNOSIS — R53.82 CHRONIC FATIGUE: ICD-10-CM

## 2023-01-06 DIAGNOSIS — G47.19 EXCESSIVE DAYTIME SLEEPINESS: ICD-10-CM

## 2023-01-06 DIAGNOSIS — G47.9 SLEEP DISTURBANCE: Primary | ICD-10-CM

## 2023-01-06 DIAGNOSIS — R06.81 WITNESSED APNEIC SPELLS: ICD-10-CM

## 2023-01-06 PROCEDURE — 99204 OFFICE O/P NEW MOD 45 MIN: CPT | Mod: GT | Performed by: NURSE PRACTITIONER

## 2023-01-06 PROCEDURE — 99213 OFFICE O/P EST LOW 20 MIN: CPT | Mod: 95 | Performed by: INTERNAL MEDICINE

## 2023-01-06 RX ORDER — FLUOXETINE 40 MG/1
40 CAPSULE ORAL DAILY
Qty: 90 CAPSULE | Refills: 3 | Status: SHIPPED | OUTPATIENT
Start: 2023-01-06 | End: 2023-02-08

## 2023-01-06 ASSESSMENT — SLEEP AND FATIGUE QUESTIONNAIRES
HOW LIKELY ARE YOU TO NOD OFF OR FALL ASLEEP WHILE WATCHING TV: MODERATE CHANCE OF DOZING
HOW LIKELY ARE YOU TO NOD OFF OR FALL ASLEEP WHILE LYING DOWN TO REST IN THE AFTERNOON WHEN CIRCUMSTANCES PERMIT: HIGH CHANCE OF DOZING
HOW LIKELY ARE YOU TO NOD OFF OR FALL ASLEEP IN A CAR, WHILE STOPPED FOR A FEW MINUTES IN TRAFFIC: SLIGHT CHANCE OF DOZING
HOW LIKELY ARE YOU TO NOD OFF OR FALL ASLEEP WHEN YOU ARE A PASSENGER IN A CAR FOR AN HOUR WITHOUT A BREAK: MODERATE CHANCE OF DOZING
HOW LIKELY ARE YOU TO NOD OFF OR FALL ASLEEP WHILE SITTING QUIETLY AFTER LUNCH WITHOUT ALCOHOL: MODERATE CHANCE OF DOZING
HOW LIKELY ARE YOU TO NOD OFF OR FALL ASLEEP WHILE SITTING AND TALKING TO SOMEONE: WOULD NEVER DOZE
HOW LIKELY ARE YOU TO NOD OFF OR FALL ASLEEP WHILE SITTING AND READING: MODERATE CHANCE OF DOZING
HOW LIKELY ARE YOU TO NOD OFF OR FALL ASLEEP WHILE SITTING INACTIVE IN A PUBLIC PLACE: MODERATE CHANCE OF DOZING

## 2023-01-06 ASSESSMENT — ANXIETY QUESTIONNAIRES
7. FEELING AFRAID AS IF SOMETHING AWFUL MIGHT HAPPEN: NOT AT ALL
IF YOU CHECKED OFF ANY PROBLEMS ON THIS QUESTIONNAIRE, HOW DIFFICULT HAVE THESE PROBLEMS MADE IT FOR YOU TO DO YOUR WORK, TAKE CARE OF THINGS AT HOME, OR GET ALONG WITH OTHER PEOPLE: SOMEWHAT DIFFICULT
5. BEING SO RESTLESS THAT IT IS HARD TO SIT STILL: NOT AT ALL
7. FEELING AFRAID AS IF SOMETHING AWFUL MIGHT HAPPEN: NOT AT ALL
8. IF YOU CHECKED OFF ANY PROBLEMS, HOW DIFFICULT HAVE THESE MADE IT FOR YOU TO DO YOUR WORK, TAKE CARE OF THINGS AT HOME, OR GET ALONG WITH OTHER PEOPLE?: SOMEWHAT DIFFICULT
4. TROUBLE RELAXING: NOT AT ALL
GAD7 TOTAL SCORE: 1
2. NOT BEING ABLE TO STOP OR CONTROL WORRYING: NOT AT ALL
3. WORRYING TOO MUCH ABOUT DIFFERENT THINGS: NOT AT ALL
1. FEELING NERVOUS, ANXIOUS, OR ON EDGE: NOT AT ALL
GAD7 TOTAL SCORE: 1
GAD7 TOTAL SCORE: 1
6. BECOMING EASILY ANNOYED OR IRRITABLE: SEVERAL DAYS
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
## 2023-01-06 NOTE — PATIENT INSTRUCTIONS
"      MY TREATMENT INFORMATION FOR SLEEP APNEA-  Yvette Garcia    DOCTOR : GOKUL Randolph CNP    Am I having a sleep study at a sleep center?  --->Due to normal delays, you will be contacted within 2-4 weeks to schedule    Am I having a home sleep study?  --->Watch the video for the device you are using:    -/drop off device-   https://www.[employer] Fortressube.com/watch?v=yGGFBdELGhk    -Disposable device sent out require phone/computer application-   https://www.Gamelet.com/watch?v=BCce_vbiwxE      Frequently asked questions:  1. What is Obstructive Sleep Apnea (LEROY)? LEROY is the most common type of sleep apnea. Apnea means, \"without breath.\"  Apnea is most often caused by narrowing or collapse of the upper airway as muscles relax during sleep.   Almost everyone has occasional apneas. Most people with sleep apnea have had brief interruptions at night frequently for many years.  The severity of sleep apnea is related to how frequent and severe the events are.   2. What are the consequences of LEROY? Symptoms include: feeling sleepy during the day, snoring loudly, gasping or stopping of breathing, trouble sleeping, and occasionally morning headaches or heartburn at night.  Sleepiness can be serious and even increase the risk of falling asleep while driving. Other health consequences may include development of high blood pressure and other cardiovascular disease in persons who are susceptible. Untreated LEROY  can contribute to heart disease, stroke and diabetes.   3. What are the treatment options? In most situations, sleep apnea is a lifelong disease that must be managed with daily therapy. Medications are not effective for sleep apnea and surgery is generally not considered until other therapies have been tried. Your treatment is your choice . Continuous Positive Airway (CPAP) works right away and is the therapy that is effective in nearly everyone. An oral device to hold your jaw forward is usually the next most " The patient is a 38y Male complaining of fever and rash. reliable option. Other options include postioning devices (to keep you off your back), weight loss, and surgery including a tongue pacing device. There is more detail about some of these options below.  4. Are my sleep studies covered by insurance? Although we will request verification of coverage, we advise you also check in advance of the study to ensure there is coverage.    Important tips for those choosing CPAP and similar devices   Know your equipment:  CPAP is continuous positive airway pressure that prevents obstructive sleep apnea by keeping the throat from collapsing while you are sleeping. In most cases, the device is  smart  and can slowly self-adjusts if your throat collapses and keeps a record every day of how well you are treated-this information is available to you and your care team.  BPAP is bilevel positive airway pressure that keeps your throat open and also assists each breath with a pressure boost to maintain adequate breathing.  Special kinds of BPAP are used in patients who have inadequate breathing from lung or heart disease. In most cases, the device is  smart  and can slowly self-adjusts to assist breathing. Like CPAP, the device keeps a record of how well you are treated.  Your mask is your connection to the device. You get to choose what feels most comfortable and the staff will help to make sure if fits. Here: are some examples of the different masks that are available:       Key points to remember on your journey with sleep apnea:  Sleep study.  PAP devices often need to be adjusted during a sleep study to show that they are effective and adjusted right.  Good tips to remember: Try wearing just the mask during a quiet time during the day so your body adapts to wearing it. A humidifier is recommended for comfort in most cases to prevent drying of your nose and throat. Allergy medication from your provider may help you if you are having nasal congestion.  Getting settled-in. It takes  more than one night for most of us to get used to wearing a mask. Try wearing just the mask during a quiet time during the day so your body adapts to wearing it. A humidifier is recommended for comfort in most cases. Our team will work with you carefully on the first day and will be in contact within 4 days and again at 2 and 4 weeks for advice and remote device adjustments. Your therapy is evaluated by the device each day.   Use it every night. The more you are able to sleep naturally for 7-8 hours, the more likely you will have good sleep and to prevent health risks or symptoms from sleep apnea. Even if you use it 4 hours it helps. Occasionally all of us are unable to use a medical therapy, in sleep apnea, it is not dangerous to miss one night.   Communicate. Call our skilled team on the number provided on the first day if your visit for problems that make it difficult to wear the device. Over 2 out of 3 patients can learn to wear the device long-term with help from our team. Remember to call our team or your sleep providers if you are unable to wear the device as we may have other solutions for those who cannot adapt to mask CPAP therapy. It is recommended that you sleep your sleep provider within the first 3 months and yearly after that if you are not having problems.   Use it for your health. We encourage use of CPAP masks during daytime quiet periods to allow your face and brain to adapt to the sensation of CPAP so that it will be a more natural sensation to awaken to at night or during naps. This can be very useful during the first few weeks or months of adapting to CPAP though it does not help medically to wear CPAP during wakefulness and  should not be used as a strategy just to meet guidelines.  Take care of your equipment. Make sure you clean your mask and tubing using directions every day and that your filter and mask are replaced as recommended or if they are not working.     BESIDES CPAP, WHAT OTHER  THERAPIES ARE THERE?    Positioning Device  Positioning devices are generally used when sleep apnea is mild and only occurs on your back.This example shows a pillow that straps around the waist. It may be appropriate for those whose sleep study shows milder sleep apnea that occurs primarily when lying flat on one's back. Preliminary studies have shown benefit but effectiveness at home may need to be verified by a home sleep test. These devices are generally not covered by medical insurance.  Examples of devices that maintain sleeping on the back to prevent snoring and mild sleep apnea.    Belt type body positioner  http://[employer]/    Electronic reminder  http://nightshifttherapy.com/            Oral Appliance  What is oral appliance therapy?  An oral appliance device fits on your teeth at night like a retainer used after having braces. The device is made by a specialized dentist and requires several visits over 1-2 months before a manufactured device is made to fit your teeth and is adjusted to prevent your sleep apnea. Once an oral device is working properly, snoring should be improved. A home sleep test may be recommended at that time if to determine whether the sleep apnea is adequately treated.       Some things to remember:  -Oral devices are often, but not always, covered by your medical insurance. Be sure to check with your insurance provider.   -If you are referred for oral therapy, you will be given a list of specialized dentists to consider or you may choose to visit the Web site of the American Academy of Dental Sleep Medicine  -Oral devices are less likely to work if you have severe sleep apnea or are extremely overweight.     More detailed information  An oral appliance is a small acrylic device that fits over the upper and lower teeth  (similar to a retainer or a mouth guard). This device slightly moves jaw forward, which moves the base of the tongue forward, opens the airway, improves breathing  for effective treat snoring and obstructive sleep apnea in perhaps 7 out of 10 people .  The best working devices are custom-made by a dental device  after a mold is made of the teeth 1, 2, 3.  When is an oral appliance indicated?  Oral appliance therapy is recommended as a first-line treatment for patients with primary snoring, mild sleep apnea, and for patients with moderate sleep apnea who prefer appliance therapy to use of CPAP4, 5. Severity of sleep apnea is determined by sleep testing and is based on the number of respiratory events per hour of sleep.   How successful is oral appliance therapy?  The success rate of oral appliance therapy in patients with mild sleep apnea is 75-80% while in patients with moderate sleep apnea it is 50-70%. The chance of success in patients with severe sleep apnea is 40-50%. The research also shows that oral appliances have a beneficial effect on the cardiovascular health of LEROY patients at the same magnitude as CPAP therapy7.  Oral appliances should be a second-line treatment in cases of severe sleep apnea, but if not completely successful then a combination therapy utilizing CPAP plus oral appliance therapy may be effective. Oral appliances tend to be effective in a broad range of patients although studies show that the patients who have the highest success are females, younger patients, those with milder disease, and less severe obesity. 3, 6.   Finding a dentist that practices dental sleep medicine  Specific training is available through the American Academy of Dental Sleep Medicine for dentists interested in working in the field of sleep. To find a dentist who is educated in the field of sleep and the use of oral appliances, near you, visit the Web site of the American Academy of Dental Sleep Medicine.    References  1. Hernandez et al. Objectively measured vs self-reported compliance during oral appliance therapy for sleep-disordered breathing. Chest 2013;  144(5): 7671-7782.  2. Rhianna et al. Objective measurement of compliance during oral appliance therapy for sleep-disordered breathing. Thorax 2013; 68(1): 91-96.  3. Jose et al. Mandibular advancement devices in 620 men and women with LEROY and snoring: tolerability and predictors of treatment success. Chest 2004; 125: 4380-5423.  4. Sis, et al. Oral appliances for snoring and LEROY: a review. Sleep 2006; 29: 244-262.  5. Obdulia et al. Oral appliance treatment for LEROY: an update. J Clin Sleep Med 2014; 10(2): 215-227.  6. Stacey et al. Predictors of OSAH treatment outcome. J Dent Res 2007; 86: 7599-3131.      Weight Loss:    Weight loss is a long-term strategy that may improve sleep apnea in some patients.    Weight management is a personal decision and the decision should be based on your interest and the potential benefits.  If you are interested in exploring weight loss strategies, the following discussion covers the impact on weight loss on sleep apnea and the approaches that may be successful.    Being overweight does not necessarily mean you will have health consequences.  Those who have BMI over 35 or over 27 with existing medical conditions carries greater risk.   Weight loss decreases severity of sleep apnea in most people with obesity. For those with mild obesity who have developed snoring with weight gain, even 15-30 pound weight loss can improve and occasionally eliminate sleep apnea.  Structured and life-long dietary and health habits are necessary to lose weight and keep healthier weight levels.     Though there may be significant health benefits from weight loss, long-term weight loss is very difficult to achieve- studies show success with dietary management in less than 10% of people. In addition, substantial weight loss may require years of dietary control and may be difficult if patients have severe obesity. In these cases, surgical management may be considered.  Finally, older  individuals who have tolerated obesity without health complications may be less likely to benefit from weight loss strategies.      Your BMI is Body mass index is 23.4 kg/m .    What is BMI?  Body mass index (BMI) is one way to tell whether you are at a healthy weight, overweight, or obese. It measures your weight in relation to your height.  A BMI of 18.5 to 24.9 is in the healthy range. A person with a BMI of 25 to 29.9 is considered overweight, and someone with a BMI of 30 or greater is considered obese.  Another way to find out if you are at risk for health problems caused by overweight and obesity is to measure your waist. If you are a woman and your waist is more than 35 inches, or if you are a man and your waist is more than 40 inches, your risk of disease may be higher.  More than two-thirds of American adults are considered overweight or obese. Being overweight or obese increases the risk for further weight gain.  Excess weight may lead to heart disease and diabetes. Creating and following plans for healthy eating and physical activity may help you improve your health.    Methods for maintaining or losing weight.  Weight control is part of healthy lifestyle and includes exercise, emotional health, and healthy eating habits.  Careful eating habits lifelong is the mainstay of weight control.  Though there are significant health benefits from weight loss, long-term weight loss with diet alone may be very difficult to achieve- studies show long-term success with dietary management in less than 10% of people. Attaining a healthy weight may be especially difficult to achieve in those with severe obesity. In some cases, medications, devices and surgical management might be considered.    What can you do?  If you are overweight or obese and are interested in methods for weight loss, you should discuss this with your provider. In addition, we recommend that you review healthy life styles and methods for weight loss  available through the National Institutes of Health patient information sites:   http://win.niddk.nih.gov/publications/index.htm    Surgery:    Surgery for obstructive sleep apnea is considered generally only when other therapies fail to work. Surgery may be discussed with you if you are having a difficult time tolerating CPAP and or when there is an abnormal structure that requires surgical correction.  Nose and throat surgeries often enlarge the airway to prevent collapse.  Most of these surgeries create pain for 1-2 weeks and up to half of the most common surgeries are not effective throughout life.  You should carefully discuss the benefits and drawbacks to surgery with your sleep provider and surgeon to determine if it is the best solution for you.   More information  Surgery for LEROY is directed at areas that are responsible for narrowing or complete obstruction of the airway during sleep.  There are a wide range of procedures available to enlarge and/or stabilize the airway to prevent blockage of breathing in the three major areas where it can occur: the palate, tongue, and nasal regions.  Successful surgical treatment depends on the accurate identification of the factors responsible for obstructive sleep apnea in each person.  A personalized approach is required because there is no single treatment that works well for everyone.  Because of anatomic variation, consultation with an examination by a sleep surgeon is a critical first step in determining what surgical options are best for each patient.  In some cases, examination during sedation may be recommended in order to guide the selection of procedures.  Patients will be counseled about risks and benefits as well as the typical recovery course after surgery. Surgery is typically not a cure for a person s LEROY.  However, surgery will often significantly improve one s LEROY severity (termed  success rate ).  Even in the absence of a cure, surgery will decrease  the cardiovascular risk associated with OSA7; improve overall quality of life8 (sleepiness, functionality, sleep quality, etc).      Palate Procedures:  Patients with LEROY often have narrowing of their airway in the region of their tonsils and uvula.  The goals of palate procedures are to widen the airway in this region as well as to help the tissues resist collapse.  Modern palate procedure techniques focus on tissue conservation and soft tissue rearrangement, rather than tissue removal.  Often the uvula is preserved in this procedure. Residual sleep apnea is common in patient after pharyngoplasty with an average reduction in sleep apnea events of 33%2.      Tongue Procedures:  ExamWhile patients are awake, the muscles that surround the throat are active and keep this region open for breathing. These muscles relax during sleep, allowing the tongue and other structures to collapse and block breathing.  There are several different tongue procedures available.  Selection of a tongue base procedure depends on characteristics seen on physical exam.  Generally, procedures are aimed at removing bulky tissues in this area or preventing the back of the tongue from falling back during sleep.  Success rates for tongue surgery range from 50-62%3.    Hypoglossal Nerve Stimulation:  Hypoglossal nerve stimulation has recently received approval from the [place] Food and Drug Administration for the treatment of obstructive sleep apnea.  This is based on research showing that the system was safe and effective in treating sleep apnea6.  Results showed that the median AHI score decreased 68%, from 29.3 to 9.0. This therapy uses an implant system that senses breathing patterns and delivers mild stimulation to airway muscles, which keeps the airway open during sleep.  The system consists of three fully implanted components: a small generator (similar in size to a pacemaker), a breathing sensor, and a stimulation lead.  Using a  small handheld remote, a patient turns the therapy on before bed and off upon awakening.    Candidates for this device must be greater than 18 years of age, have moderate to severe LEROY (AHI between 15-65), BMI less than 35, have tried CPAP/oral appliance for at least 8 weeks without success, and have appropriate upper airway anatomy (determined by a sleep endoscopy performed by Dr. Calvin Romero).    Hypoglossal Nerve Stimulation Pathway:    The sleep surgeon s office will work with the patient through the insurance prior-authorization process (including communications and appeals).    Nasal Procedures:  Nasal obstruction can interfere with nasal breathing during the day and night.  Studies have shown that relief of nasal obstruction can improve the ability of some patients to tolerate positive airway pressure therapy for obstructive sleep apnea1.  Treatment options include medications such as nasal saline, topical corticosteroid and antihistamine sprays, and oral medications such as antihistamines or decongestants. Non-surgical treatments can include external nasal dilators for selected patients. If these are not successful by themselves, surgery can improve the nasal airway either alone or in combination with these other options.      Combination Procedures:  Combination of surgical procedures and other treatments may be recommended, particularly if patients have more than one area of narrowing or persistent positional disease.  The success rate of combination surgery ranges from 66-80%2,3.    References  Una BERNSTEIN. The Role of the Nose in Snoring and Obstructive Sleep Apnoea: An Update.  Eur Arch Otorhinolaryngol. 2011; 268: 1365-73.   David SM; Alfonso JA; Lorenzo JR; Pallanch JF; Asad MB; Ward SG; Emeka POST. Surgical modifications of the upper airway for obstructive sleep apnea in adults: a systematic review and meta-analysis. SLEEP 2010;33(10):3504-8394. Sarita ROMERO. Hypopharyngeal surgery in obstructive  sleep apnea: an evidence-based medicine review.  Arch Otolaryngol Head Neck Surg. 2006 Feb;132(2):206-13.  Nilton YH1, Catrachito Y, Celio ALYCE. The efficacy of anatomically based multilevel surgery for obstructive sleep apnea. Otolaryngol Head Neck Surg. 2003 Oct;129(4):327-35.  Sarita ROMERO, Goldberg A. Hypopharyngeal Surgery in Obstructive Sleep Apnea: An Evidence-Based Medicine Review. Arch Otolaryngol Head Neck Surg. 2006 Feb;132(2):206-13.  Mary CHU et al. Upper-Airway Stimulation for Obstructive Sleep Apnea.  N Engl J Med. 2014 Jan 9;370(2):139-49.  Doug Y et al. Increased Incidence of Cardiovascular Disease in Middle-aged Men with Obstructive Sleep Apnea. Am J Respir Crit Care Med; 2002 166: 159-165  Madridjean pierre CESAR et al. Studying Life Effects and Effectiveness of Palatopharyngoplasty (SLEEP) study: Subjective Outcomes of Isolated Uvulopalatopharyngoplasty. Otolaryngol Head Neck Surg. 2011; 144: 623-631.        WHAT IF I ONLY HAVE SNORING?    Mandibular advancement devices, lateral sleep positioning, long-term weight loss and treatment of nasal allergies have been shown to improve snoring.  Exercising tongue muscles with a game (Buzzillattps://apps.Walmoo/[employer] [place]/dashawn/soundly-reduce-snoring/lw8102870985) or stimulating the tongue during the day with a device (https://doi.org/10.3390/lkd13577293) have improved snoring in some individuals.    Remember to Drive Safe... Drive Alive     Sleep health profoundly affects your health, mood, and your safety.  Thirty three percent of the population (one in three of us) is not getting enough sleep and many have a sleep disorder. Not getting enough sleep or having an untreated / undertreated sleep condition may make us sleepy without even knowing it. In fact, our driving could be dramatically impaired due to our sleep health. As your provider, here are some things I would like you to know about driving:     Here are some warning signs for impairment and dangerous drowsy driving:               -Having been awake more than 16 hours               -Looking tired               -Eyelid drooping              -Head nodding (it could be too late at this point)              -Driving for more than 30 minutes     Some things you could do to make the driving safer if you are experiencing some drowsiness:              -Stop driving and rest              -Call for transportation              -Make sure your sleep disorder is adequately treated     Some things that have been shown NOT to work when experiencing drowsiness while driving:              -Turning on the radio              -Opening windows              -Eating any  distracting  /  entertaining  foods (e.g., sunflower seeds, candy, or any other)              -Talking on the phone      Your decision may not only impact your life, but also the life of others. Please, remember to drive safe for yourself and all of us.

## 2023-01-06 NOTE — PROGRESS NOTES
Melinda is a 42 year old who is being evaluated via a billable video visit.      Assessment & Plan     Current moderate episode of major depressive disorder without prior episode (H)  - FLUoxetine (PROZAC) 40 MG capsule; Take 1 capsule (40 mg) by mouth daily    WILFREDO (generalized anxiety disorder)  - FLUoxetine (PROZAC) 40 MG capsule; Take 1 capsule (40 mg) by mouth daily      Doing well on current regimen.  Still suffers from chronic fatigue - is meeting with sleep psychologist today.  Will f/up notes and recs.    Refilled prozac x 1 year for now - follow-up at next yearly physical or sooner if needed.         No follow-ups on file.    Maryuri Jonas MD  [facility]    Ryne Lawrence is a 42 year old, presenting for the following health issues:  No chief complaint on file.      History of Present Illness       Mental Health Follow-up:  Patient presents to follow-up on Depression & Anxiety.Patient's depression since last visit has been:  Good  The patient is having other symptoms associated with depression.  Patient's anxiety since last visit has been:  Good  The patient is not having other symptoms associated with anxiety.  Any significant life events: No  Patient is not feeling anxious or having panic attacks.  Patient has no concerns about alcohol or drug use.    She eats 2-3 servings of fruits and vegetables daily.She consumes 1 sweetened beverage(s) daily.She exercises with enough effort to increase her heart rate 20 to 29 minutes per day.  She exercises with enough effort to increase her heart rate 4 days per week.   She is taking medications regularly.    Today's PHQ-9         PHQ-9 Total Score: 5    PHQ-9 Q9 Thoughts of better off dead/self-harm past 2 weeks :   Not at all    How difficult have these problems made it for you to do your work, take care of things at home, or get along with other people: Somewhat difficult  Today's WILFREDO-7 Score: 1         Review of Systems   All other  systems on a 10-point review are negative, unless otherwise noted in HPI        Objective           Vitals:  No vitals were obtained today due to virtual visit.    Physical Exam   GENERAL: Healthy, alert and no distress  EYES: Eyes grossly normal to inspection.  No discharge or erythema, or obvious scleral/conjunctival abnormalities.  RESP: No audible wheeze, cough, or visible cyanosis.  No visible retractions or increased work of breathing.    SKIN: Visible skin clear. No significant rash, abnormal pigmentation or lesions.  NEURO: Cranial nerves grossly intact.  Mentation and speech appropriate for age.  PSYCH: Mentation appears normal, affect normal/bright, judgement and insight intact, normal speech and appearance well-groomed.                Video-Visit Details    Type of service:  Video Visit     Originating Location (pt. Location): Home  Distant Location (provider location):  Off-site  Platform used for Video Visit: Kristine

## 2023-02-07 ENCOUNTER — NURSE TRIAGE (OUTPATIENT)
Dept: NURSING | Facility: CLINIC | Age: 43
End: 2023-02-07
Payer: COMMERCIAL

## 2023-02-07 DIAGNOSIS — F41.1 GAD (GENERALIZED ANXIETY DISORDER): ICD-10-CM

## 2023-02-07 DIAGNOSIS — F32.1 CURRENT MODERATE EPISODE OF MAJOR DEPRESSIVE DISORDER WITHOUT PRIOR EPISODE (H): ICD-10-CM

## 2023-02-07 RX ORDER — FLUOXETINE 40 MG/1
40 CAPSULE ORAL DAILY
Qty: 90 CAPSULE | Refills: 3 | OUTPATIENT
Start: 2023-02-07

## 2023-02-07 NOTE — TELEPHONE ENCOUNTER
[employer] needing re send of Prozac. Drug to drug interaction with Ibuprofen, override reason needed by PCP.   Joanie Chen, RN on 2/7/2023 at 5:25 PM    Reason for Disposition    [1] Prescription not at pharmacy AND [2] was prescribed by PCP recently  (Exception: triager has access to EMR and prescription is recorded there. Go to Home Care and confirm for pharmacy.)    Additional Information    Negative: New-onset or worsening symptoms, see that guideline (e.g., diarrhea, runny nose, sore throat)    Negative: Medicine question not related to refill or renewal    Negative: Caller (e.g., patient or pharmacist) requesting information about a new medicine    Negative: Caller requesting information unrelated to medicine    Negative: [1] Prescription refill request for ESSENTIAL medicine (i.e., likelihood of harm to patient if not taken) AND [2] triager unable to refill per department policy    Protocols used: MEDICATION REFILL AND RENEWAL CALL-A-

## 2023-02-08 RX ORDER — FLUOXETINE 40 MG/1
40 CAPSULE ORAL DAILY
Qty: 90 CAPSULE | Refills: 3 | Status: SHIPPED | OUTPATIENT
Start: 2023-02-08 | End: 2023-12-12

## 2023-02-09 ENCOUNTER — NURSE TRIAGE (OUTPATIENT)
Dept: NURSING | Facility: CLINIC | Age: 43
End: 2023-02-09
Payer: COMMERCIAL

## 2023-02-09 NOTE — TELEPHONE ENCOUNTER
"Nurse Triage SBAR    Situation: Medication question    Background: Pharmacy Elías Branch, from [facility] is calling fro prescription clarification    Assessment: Pharmacy wants to clarify if the Prozac is replacing the citalopram.    Recommendation:   Note from 11/1/2022 states from Dr Jonas    \"- Although pt reporting stable/well-controlled anxiety/depression, due to chronic fatigue, will trial switch from citalopram to prozac, to see if this helps with fatigue.\"    Elías stated understanding.       Bryanna Davis, RN Nursing Advisor 2/9/2023 4:45 PM     Reason for Disposition    Pharmacy calling with prescription question and triager answers question    Additional Information    Negative: [1] Intentional drug overdose AND [2] suicidal thoughts or ideas    Negative: Drug overdose and triager unable to answer question    Negative: Caller requesting a renewal or refill of a medicine patient is currently taking    Negative: Caller requesting information unrelated to medicine    Negative: Caller requesting information about COVID-19 Vaccine    Negative: Caller requesting information about Emergency Contraception    Negative: Caller requesting information about Combined Birth Control Pills    Negative: Caller requesting information about Progestin Birth Control Pills    Negative: Caller requesting information about Post-Op pain or medicines    Negative: Caller requesting a prescription antibiotic (such as Penicillin) for Strep throat and has a positive culture result    Negative: Caller requesting a prescription anti-viral med (such as Tamiflu) and has influenza (flu) symptoms    Negative: Immunization reaction suspected    Negative: Rash while taking a medicine or within 3 days of stopping it    Negative: [1] Asthma and [2] having symptoms of asthma (cough, wheezing, etc.)    Negative: [1] Symptom of illness (e.g., headache, abdominal pain, earache, vomiting) AND [2] more than mild    Negative: Breastfeeding " questions about mother's medicines and diet    Negative: MORE THAN A DOUBLE DOSE of a prescription or over-the-counter (OTC) drug    Negative: [1] DOUBLE DOSE (an extra dose or lesser amount) of prescription drug AND [2] any symptoms (e.g., dizziness, nausea, pain, sleepiness)    Negative: [1] DOUBLE DOSE (an extra dose or lesser amount) of over-the-counter (OTC) drug AND [2] any symptoms (e.g., dizziness, nausea, pain, sleepiness)    Negative: Took another person's prescription drug    Negative: Medicine patch causing local rash or itching    Negative: [1] DOUBLE DOSE (an extra dose or lesser amount) of prescription drug AND [2] NO symptoms (Exception: a double dose of antibiotics)    Negative: Diabetes drug error or overdose (e.g., took wrong type of insulin or took extra dose)    Negative: [1] Prescription not at pharmacy AND [2] was prescribed by PCP recently (Exception: triager has access to EMR and prescription is recorded there. Go to Home Care and confirm for pharmacy.)    Negative: [1] Pharmacy calling with prescription question AND [2] triager unable to answer question    Negative: [1] Caller has URGENT medicine question about med that PCP or specialist prescribed AND [2] triager unable to answer question    Negative: [1] Caller has medicine question about med NOT prescribed by PCP AND [2] triager unable to answer question (e.g., compatibility with other med, storage)    Negative: Prescription request for new medicine (not a refill)    Negative: [1] Caller has NON-URGENT medicine question about med that PCP prescribed AND [2] triager unable to answer question    Negative: Caller wants to use a complementary or alternative medicine    Negative: [1] Prescription prescribed recently is not at pharmacy AND [2] triager has access to patient's EMR AND [3] prescription is recorded in the EMR    Negative: [1] DOUBLE DOSE (an extra dose or lesser amount) of over-the-counter (OTC) drug AND [2] NO symptoms     Negative: [1] DOUBLE DOSE (an extra dose or lesser amount) of antibiotic drug AND [2] NO symptoms    Negative: Caller requesting information about medicine use with breastfeeding; neither adult nor infant is ill, triager answers question    Negative: Caller has medicine question, adult has minor symptoms, caller declines triage, AND triager answers question    Negative: Caller has medicine question only, adult not sick, AND triager answers question    Negative: Caller requesting information about medicine during pregnancy; adult is not ill AND triager answers question    Protocols used: MEDICATION QUESTION CALL-A-

## 2023-05-15 ASSESSMENT — SLEEP AND FATIGUE QUESTIONNAIRES
HOW LIKELY ARE YOU TO NOD OFF OR FALL ASLEEP WHILE SITTING AND READING: HIGH CHANCE OF DOZING
HOW LIKELY ARE YOU TO NOD OFF OR FALL ASLEEP WHILE SITTING INACTIVE IN A PUBLIC PLACE: MODERATE CHANCE OF DOZING
HOW LIKELY ARE YOU TO NOD OFF OR FALL ASLEEP IN A CAR, WHILE STOPPED FOR A FEW MINUTES IN TRAFFIC: SLIGHT CHANCE OF DOZING
HOW LIKELY ARE YOU TO NOD OFF OR FALL ASLEEP WHILE WATCHING TV: HIGH CHANCE OF DOZING
HOW LIKELY ARE YOU TO NOD OFF OR FALL ASLEEP WHILE SITTING QUIETLY AFTER LUNCH WITHOUT ALCOHOL: MODERATE CHANCE OF DOZING
HOW LIKELY ARE YOU TO NOD OFF OR FALL ASLEEP WHEN YOU ARE A PASSENGER IN A CAR FOR AN HOUR WITHOUT A BREAK: HIGH CHANCE OF DOZING
HOW LIKELY ARE YOU TO NOD OFF OR FALL ASLEEP WHILE LYING DOWN TO REST IN THE AFTERNOON WHEN CIRCUMSTANCES PERMIT: HIGH CHANCE OF DOZING
HOW LIKELY ARE YOU TO NOD OFF OR FALL ASLEEP WHILE SITTING AND TALKING TO SOMEONE: WOULD NEVER DOZE

## 2023-05-17 ENCOUNTER — THERAPY VISIT (OUTPATIENT)
Dept: SLEEP MEDICINE | Facility: CLINIC | Age: 43
End: 2023-05-17
Payer: COMMERCIAL

## 2023-05-17 DIAGNOSIS — F32.1 CURRENT MODERATE EPISODE OF MAJOR DEPRESSIVE DISORDER WITHOUT PRIOR EPISODE (H): ICD-10-CM

## 2023-05-17 DIAGNOSIS — F41.1 GAD (GENERALIZED ANXIETY DISORDER): ICD-10-CM

## 2023-05-17 DIAGNOSIS — R06.83 SNORING: ICD-10-CM

## 2023-05-17 DIAGNOSIS — G47.9 SLEEP DISTURBANCE: ICD-10-CM

## 2023-05-17 DIAGNOSIS — R06.81 WITNESSED APNEIC SPELLS: ICD-10-CM

## 2023-05-17 DIAGNOSIS — R53.82 CHRONIC FATIGUE: ICD-10-CM

## 2023-05-17 DIAGNOSIS — G47.19 EXCESSIVE DAYTIME SLEEPINESS: ICD-10-CM

## 2023-05-17 PROCEDURE — 95810 POLYSOM 6/> YRS 4/> PARAM: CPT | Performed by: INTERNAL MEDICINE

## 2023-05-19 LAB — SLPCOMP: NORMAL

## 2023-05-19 NOTE — PROCEDURES
" SLEEP STUDY INTERPRETATION  DIAGNOSTIC POLYSOMNOGRAPHY REPORT      Patient: NIC DENG  YOB: 1980  Study Date: 5/17/2023  MRN: [id number]  Referring Provider: Salud Jonas  Ordering Provider: Edil Parker    Indications for Polysomnography: The patient is a 42 year old Female who is 5' 6\" and weighs 145.0 lbs. Her BMI is 23.6, Hoskins sleepiness scale 14 and neck circumference is 38 cm. A diagnostic polysomnogram was performed to evaluate for sleep apnea.    Polysomnogram Data: A full night polysomnogram recorded the standard physiologic parameters including EEG, EOG, EMG, ECG, nasal and oral airflow. Respiratory parameters of chest and abdominal movements were recorded with respiratory inductance plethysmography. Oxygen saturation was recorded by pulse oximetry. Hypopnea scoring rule used: 1B 4%.    Sleep Architecture: Fragmented sleep with reduced sleep efficiency.   The total recording time of the polysomnogram was 450.1 minutes. The total sleep time was 250.0 minutes. Sleep latency was increased at 39.6 minutes without the use of a sleep aid. REM latency was 139.0 minutes. Arousal index was increased at 22.3 arousals per hour. Sleep efficiency was decreased at 55.5%. Wake after sleep onset was 160.0 minutes. The patient spent 16.2% of total sleep time in Stage N1, 35.6% in Stage N2, 15.4% in Stage N3, and 32.8% in REM. Time in REM supine was - minutes.    Respiration: Mild obstructive sleep apnea.     Events ? The polysomnogram revealed a presence of 4 obstructive, 2 central, and 1 mixed apneas resulting in an apnea index of 1.7 events per hour. There were 15 obstructive hypopneas and - central hypopneas resulting in an obstructive hypopnea index of 3.6 and central hypopnea index of - events per hour. The combined apnea/hypopnea index was 5.3 events per hour (central apnea/hypopnea index was 0.5 events per hour). The REM AHI was 0.7 events per hour. The supine AHI was " 15.0 events per hour. The RERA index was 1.2 events per hour.  The RDI was 6.5 events per hour.    Snoring - was reported as mild.    Respiratory rate and pattern - was notable for normal respiratory rate and pattern.    Sustained Sleep Associated Hypoventilation - Transcutaneous carbon dioxide monitoring was not used, however significant hypoventilation was not suggested by oximetry.    Sleep Associated Hypoxemia - (Greater than 5 minutes O2 sat at or below 88%) was/was not present. Baseline oxygen saturation was 94.6%. Lowest oxygen saturation was 86.0%. Time spent less than or equal to 88% was 0 minutes. Time spent less than or equal to 89% was 0.2 minutes.    Movement Activity: Increased periodic limb movements of sleep without significant degree of associated arousals.     Periodic Limb Activity - There were 138 PLMs during the entire study. The PLM index was 33.1 movements per hour. The PLM Arousal Index was 2.2 per hour.    REM EMG Activity - Excessive transient/sustained muscle activity was not present.    Nocturnal Behavior - Abnormal sleep related behaviors were not noted during/arising out of NREM / REM sleep.     Bruxism - None apparent.    Cardiac Summary: Sinus rhythm.   The average pulse rate was 79.5 bpm. The minimum pulse rate was 70.0 bpm while the maximum pulse rate was 107.0 bpm.  Arrhythmias were not noted.    Assessment:     This sleep study shows a mild degree of obstructive sleep apnea.  Of note, there was minimal supine sleep during this test. If patient's normal sleep pattern includes substantial supine sleep, there could be underestimation of sleep disordered breathing.    There was an increased frequency of periodic limb movements of sleep.  However, majority of the movements were not associated with arousals.    Recommendations:    Treatment is optional for mild obstructive sleep apnea.  If treatment is clinically indicated, following therapy options can be considered.     Patient may be  a candidate for dental appliance through referral to Sleep Dentistry for the treatment of obstructive sleep apnea.    If there is excessive daytime sleepiness, treatment could be empirically initiated with Auto?titrating PAP therapy with a range of 5 to 15 cmH2O. Recommend clinical follow up with sleep management team.    Suggest optimizing sleep schedule and avoiding sleep deprivation.    Pharmacologic therapy should be used for management of restless legs syndrome only if present and clinically indicated and not based on the presence of periodic limb movements alone.    Diagnostic Codes:   Obstructive Sleep Apnea G47.33  Repetitive Intrusions Into Sleep F51.8    5/17/2023 [facility] Sleep Study (145.0 lbs) - AHI 5.3, RDI 6.5, Supine AHI 15.0, REM AHI 0.7, Low O2 86.0%, Time Spent ?88% 0 minutes / Time Spent ?89% 0.2 minutes.     _____________________________________   Electronically Signed By: Nick Mittal MD 5/19/2023

## 2023-05-25 ASSESSMENT — SLEEP AND FATIGUE QUESTIONNAIRES
HOW LIKELY ARE YOU TO NOD OFF OR FALL ASLEEP WHILE LYING DOWN TO REST IN THE AFTERNOON WHEN CIRCUMSTANCES PERMIT: HIGH CHANCE OF DOZING
HOW LIKELY ARE YOU TO NOD OFF OR FALL ASLEEP WHILE SITTING AND READING: HIGH CHANCE OF DOZING
HOW LIKELY ARE YOU TO NOD OFF OR FALL ASLEEP WHILE WATCHING TV: HIGH CHANCE OF DOZING
HOW LIKELY ARE YOU TO NOD OFF OR FALL ASLEEP WHEN YOU ARE A PASSENGER IN A CAR FOR AN HOUR WITHOUT A BREAK: HIGH CHANCE OF DOZING
HOW LIKELY ARE YOU TO NOD OFF OR FALL ASLEEP IN A CAR, WHILE STOPPED FOR A FEW MINUTES IN TRAFFIC: SLIGHT CHANCE OF DOZING
HOW LIKELY ARE YOU TO NOD OFF OR FALL ASLEEP WHILE SITTING QUIETLY AFTER LUNCH WITHOUT ALCOHOL: MODERATE CHANCE OF DOZING
HOW LIKELY ARE YOU TO NOD OFF OR FALL ASLEEP WHILE SITTING AND TALKING TO SOMEONE: WOULD NEVER DOZE
HOW LIKELY ARE YOU TO NOD OFF OR FALL ASLEEP WHILE SITTING INACTIVE IN A PUBLIC PLACE: MODERATE CHANCE OF DOZING

## 2023-05-30 ENCOUNTER — VIRTUAL VISIT (OUTPATIENT)
Dept: SLEEP MEDICINE | Facility: CLINIC | Age: 43
End: 2023-05-30
Payer: COMMERCIAL

## 2023-05-30 VITALS
DIASTOLIC BLOOD PRESSURE: 60 MMHG | BODY MASS INDEX: 22.5 KG/M2 | WEIGHT: 140 LBS | SYSTOLIC BLOOD PRESSURE: 110 MMHG | HEIGHT: 66 IN

## 2023-05-30 DIAGNOSIS — G47.33 OSA (OBSTRUCTIVE SLEEP APNEA): Primary | ICD-10-CM

## 2023-05-30 PROCEDURE — 99213 OFFICE O/P EST LOW 20 MIN: CPT | Mod: VID | Performed by: NURSE PRACTITIONER

## 2023-05-30 ASSESSMENT — PAIN SCALES - GENERAL: PAINLEVEL: NO PAIN (0)

## 2023-05-30 NOTE — PATIENT INSTRUCTIONS
SLEEP DENTAL PROVIDERS LIST:    [facility] [place] (Medicare)  Provider: Michael Morris DDS   [address] [place] [address]  Phone: [phone]  Fax: [phone]    [facility]  Providers: Gaston Salgado DDS, Cate Reynoso DDS, Vinh Kolb DDS  Fax [phone]  Locations:   [phone]  -[place]  [address] beds at the [facility], [address]  [facility]  [address]  -[place]  [address]   [facility]  [address]   [phone]  [facility]  [address]  [address] [facility] [address]  -[place]  [address] [facility] (Medicare)  Providers: Duran Cobb DDS, FAAYDEE, Darcy Castle DDS, MS, Yelitza De La Fuente DDS, Gracie Barahona DDS  [address], [address], [place] [address]  Phone: [phone]  Fax: [phone]    [facility]  Provider: Lisandra Miranda DDS, PhD    [address] [place] [address]  Phone: [phone]  Fax: [phone]    [address] [place] [address]  Phone: [phone]  Fax: [phone]      [facility]  Provider: Allen Holley DDS  Address: [address], [place] [address]  Phone: [phone]      [facility]   Provider: Claudio Dueñas DDS  [facility]  [address] [place] [address]  Appointments: [phone]    [facility]   Provider: Nate Welch DDS   [facility]   [address], [address]   [place] [address]   Appointments: [phone]   Fax: [phone]     [facility]   Provider: Ponce Zambrano DDS, MS - [DME Medicare]  [address]   [place] [address]   Appointments: [phone]   Fax: [phone]     [facility]  Provider: Colby De Jesus DMD, MSD - [DME  Medicare]  [address] [address], [address]  [place] [address]  Appointments [phone]  Fax: [phone]    [facility]   [facility]   Provider: Torito Mittal DDS, [facility]  [address] [place] [address] [address]  [place] [address]   Appointments: [phone]  Fax: [phone]     [facility]   [facility]   Providers: Yayo Peres, KENNY, Nate Welch DDS   [address] [facility]   [place] [address]   Appointments: [phone]     [facility]   Providers: Kristopher Bolanos DDS, Vinh Bruce DDS  [address]   [place] [address]   Appointments: [phone]   Fax: [phone]     Provider: Lencho Crespo DDS  [address]  [address]  [phone]

## 2023-05-30 NOTE — NURSING NOTE
Is the patient currently in the state of [place]? YES    Visit mode:VIDEO    If the visit is dropped, the patient can be reconnected by: VIDEO VISIT: Send to e-mail at: [email or web address]    Will anyone else be joining the visit? NO      How would you like to obtain your AVS? MyChart    Are changes needed to the allergy or medication list? NO    Reason for visit: RECHECK      Has patient had flu shot for current/most recent flu season? If so, when? Yes: 10/2022     Kamilla BEADREN

## 2023-05-30 NOTE — PROGRESS NOTES
"Virtual Visit Details    Type of service:  Video Visit     Originating Location (pt. Location): Home    Distant Location (provider location):  Off-site  Platform used for Video Visit: [facility]       Sleep Study Follow-Up Visit:    Date on this visit: 5/30/2023    Yvette Garcia is a 42-year-old female with a PMH pertinent for major depression, anxiety, and asthma who presents  today for follow-up of her sleep study done on 5/17/2023 at the [facility] [place] for possible sleep apnea.            These findings were reviewed with patient.     Past medical/surgical history, family history, social history, medications and allergies were reviewed.      Problem List:  Patient Active Problem List    Diagnosis Date Noted     Current moderate episode of major depressive disorder without prior episode (H) 09/11/2019     Priority: Medium     WILFREDO (generalized anxiety disorder) 09/11/2019     Priority: Medium      Current Outpatient Medications   Medication     acetaminophen (TYLENOL) 325 MG tablet     buPROPion (WELLBUTRIN XL) 300 MG 24 hr tablet     cetirizine (ZYRTEC) 10 MG tablet     desogestrel-ethinyl estradiol (KARIVA) 0.15-0.02/0.01 MG (21/5) tablet     FLUoxetine (PROZAC) 40 MG capsule     ibuprofen (ADVIL,MOTRIN) 400 MG tablet     No current facility-administered medications for this visit.     /60   Ht 1.676 m (5' 6\")   Wt 63.5 kg (140 lb)   BMI 22.60 kg/m      Impression/Plan:  Mild Obstructive Sleep Apnea.   - Sleep associated hypoxemia was not present.  - Increased periodic limb movements of sleep without significant degree of associated arousals.   - Sleep Dental Referral; Future    Treatment options discussed today including  auto-CPAP at 5-15 cmH2O, oral appliance therapy or positional therapy.    Elected treatment with oral appliance therapy.  A sleep dental referral order was placed for mandibular advancement device (MAD) to treat mild LEROY and snoring.  We discussed usual risks " associated with MAD such as orthodontic changes like changes in bite, shifting of teeth, and TMJ syndrome.    She will follow up with me as needed.    Twenty-five minutes spent with patient, all of which were spent face-to-face counseling, consulting, chart review/documentation, and coordinating plan of care on the date of the encounter.      GOKUL Randolph CNP  Sleep Medicine      CC: Salud Jonas Mai

## 2023-08-17 ASSESSMENT — ANXIETY QUESTIONNAIRES
5. BEING SO RESTLESS THAT IT IS HARD TO SIT STILL: SEVERAL DAYS
IF YOU CHECKED OFF ANY PROBLEMS ON THIS QUESTIONNAIRE, HOW DIFFICULT HAVE THESE PROBLEMS MADE IT FOR YOU TO DO YOUR WORK, TAKE CARE OF THINGS AT HOME, OR GET ALONG WITH OTHER PEOPLE: VERY DIFFICULT
2. NOT BEING ABLE TO STOP OR CONTROL WORRYING: SEVERAL DAYS
4. TROUBLE RELAXING: SEVERAL DAYS
GAD7 TOTAL SCORE: 10
1. FEELING NERVOUS, ANXIOUS, OR ON EDGE: MORE THAN HALF THE DAYS
7. FEELING AFRAID AS IF SOMETHING AWFUL MIGHT HAPPEN: SEVERAL DAYS
3. WORRYING TOO MUCH ABOUT DIFFERENT THINGS: SEVERAL DAYS
GAD7 TOTAL SCORE: 10
6. BECOMING EASILY ANNOYED OR IRRITABLE: NEARLY EVERY DAY

## 2023-08-17 ASSESSMENT — PATIENT HEALTH QUESTIONNAIRE - PHQ9
10. IF YOU CHECKED OFF ANY PROBLEMS, HOW DIFFICULT HAVE THESE PROBLEMS MADE IT FOR YOU TO DO YOUR WORK, TAKE CARE OF THINGS AT HOME, OR GET ALONG WITH OTHER PEOPLE: VERY DIFFICULT
SUM OF ALL RESPONSES TO PHQ QUESTIONS 1-9: 13
SUM OF ALL RESPONSES TO PHQ QUESTIONS 1-9: 13

## 2023-08-18 ENCOUNTER — VIRTUAL VISIT (OUTPATIENT)
Dept: PEDIATRICS | Facility: CLINIC | Age: 43
End: 2023-08-18
Payer: COMMERCIAL

## 2023-08-18 DIAGNOSIS — F41.1 GAD (GENERALIZED ANXIETY DISORDER): Primary | ICD-10-CM

## 2023-08-18 DIAGNOSIS — R53.82 CHRONIC FATIGUE: ICD-10-CM

## 2023-08-18 PROCEDURE — 99214 OFFICE O/P EST MOD 30 MIN: CPT | Mod: VID | Performed by: INTERNAL MEDICINE

## 2023-08-18 RX ORDER — PROPRANOLOL HYDROCHLORIDE 10 MG/1
10 TABLET ORAL 4 TIMES DAILY PRN
Qty: 30 TABLET | Refills: 3 | Status: SHIPPED | OUTPATIENT
Start: 2023-08-18 | End: 2024-04-02

## 2023-08-18 RX ORDER — BUSPIRONE HYDROCHLORIDE 5 MG/1
TABLET ORAL
Qty: 90 TABLET | Refills: 0 | Status: SHIPPED | OUTPATIENT
Start: 2023-08-18 | End: 2023-09-15

## 2023-08-18 ASSESSMENT — COLUMBIA-SUICIDE SEVERITY RATING SCALE - C-SSRS
2. IN THE PAST MONTH, HAVE YOU ACTUALLY HAD ANY THOUGHTS OF KILLING YOURSELF?: NO
1. WITHIN THE PAST MONTH, HAVE YOU WISHED YOU WERE DEAD OR WISHED YOU COULD GO TO SLEEP AND NOT WAKE UP?: NO
6. HAVE YOU EVER DONE ANYTHING, STARTED TO DO ANYTHING, OR PREPARED TO DO ANYTHING TO END YOUR LIFE?: NO

## 2023-08-18 NOTE — PROGRESS NOTES
Melinda is a 42 year old who is being evaluated via a billable video visit.          Assessment & Plan     Pt continues to struggle with crippling anxiety and chronic fatigue.  She feels she is unable to do the things she needs/wants to and needs to nap almost every day.  She is teary eyed today.  We have already tried prozac and bupropion in the past.  We have completed work-ups in the past for fatigue as well.    Will trial prn propranolol for severe anxiety attacks.  Will start buspar and f/up in 4 weeks.    At this point, I believe a neuropsych evaluation may be warranted to evaluate underlying causes (is fatigue due to poorly controlled anxiety or chronic fatigue syndrome, for example?)    Will initiate [facility] [place] short-term therapy due to severity of symptoms and some suicidal ideation (no intention, plan).    Finally, will refer to psychiatry to assist in medication management.    WILFREDO (generalized anxiety disorder)  - Adult Mental Health  Referral; Future  - Adult Mental Health  Referral; Future  - Adult Mental Health  Referral; Future  - busPIRone (BUSPAR) 5 MG tablet; Take 5 mg twice daily.  Increase every 2-3 days in increments of 5 mg per day to a max dose of 15 mg twice daily.  - propranolol (INDERAL) 10 MG tablet; Take 1 tablet (10 mg) by mouth 4 times daily as needed (anxiety)    Chronic fatigue  - Adult Mental Health  Referral; Future  - Adult Mental Health  Referral; Future  - Adult Mental Health  Referral; Future           Depression Screening Follow Up        8/17/2023     1:56 PM   PHQ   PHQ-9 Total Score 13   Q9: Thoughts of better off dead/self-harm past 2 weeks Several days   F/U: Thoughts of suicide or self-harm No   F/U: Safety concerns No         8/17/2023     1:56 PM   Last PHQ-9   1.  Little interest or pleasure in doing things 2   2.  Feeling down, depressed, or hopeless 2   3.  Trouble falling or staying asleep, or sleeping too much 2   4.   Feeling tired or having little energy 3   5.  Poor appetite or overeating 1   6.  Feeling bad about yourself 1   7.  Trouble concentrating 1   8.  Moving slowly or restless 0   Q9: Thoughts of better off dead/self-harm past 2 weeks 1   PHQ-9 Total Score 13   In the past two weeks have you had thoughts of suicide or self harm? No   Do you have concerns about your personal safety or the safety of others? No             8/18/2023    10:43 AM   C-SSRS (Brief [place])   Within the last month, have you wished you were dead or wished you could go to sleep and not wake up? No   Within the last month, have you had any actual thoughts of killing yourself? No   Within the last month, have you ever done anything, started to do anything, or prepared to do anything to end your life? No       Follow Up        Follow Up Actions Taken  Consult with [facility] [place], monitored patient safety and notified provider.    Discussed the following ways the patient can remain in a safe environment:  be around others  See Patient Instructions    Maryuri Jonas MD  [facility] ANGÉLICA Lawrence is a 42 year old, presenting for the following health issues:  No chief complaint on file.         No data to display                History of Present Illness       Mental Health Follow-up:  Patient presents to follow-up on Depression & Anxiety.Patient's depression since last visit has been:  Worse  The patient is having other symptoms associated with depression.  Patient's anxiety since last visit has been:  Worse  The patient is having other symptoms associated with anxiety.  Any significant life events: relationship concerns  Patient is feeling anxious or having panic attacks.  Patient has concerns about alcohol or drug use.    Reason for visit:  Persistent fatigue    She eats 2-3 servings of fruits and vegetables daily.She consumes 2 sweetened beverage(s) daily.She exercises with enough effort to increase her heart rate 30 to 60  minutes per day.  She exercises with enough effort to increase her heart rate 4 days per week.   She is taking medications regularly.               Review of Systems   Constitutional, HEENT, cardiovascular, pulmonary, gi and gu systems are negative, except as otherwise noted.      Objective           Vitals:  No vitals were obtained today due to virtual visit.    Physical Exam   GENERAL: Healthy, alert and no distress  EYES: Eyes grossly normal to inspection.  No discharge or erythema, or obvious scleral/conjunctival abnormalities.  RESP: No audible wheeze, cough, or visible cyanosis.  No visible retractions or increased work of breathing.    SKIN: Visible skin clear. No significant rash, abnormal pigmentation or lesions.  NEURO: Cranial nerves grossly intact.  Mentation and speech appropriate for age.  PSYCH: Mentation appears normal, affect normal/bright, judgement and insight intact, normal speech and appearance well-groomed.                Video-Visit Details    Type of service:  Video Visit     Originating Location (pt. Location): Home    Distant Location (provider location):  Off-site  Platform used for Video Visit: [employer]

## 2023-08-21 ENCOUNTER — TELEPHONE (OUTPATIENT)
Dept: NEUROPSYCHOLOGY | Facility: CLINIC | Age: 43
End: 2023-08-21
Payer: COMMERCIAL

## 2023-08-21 ENCOUNTER — TELEPHONE (OUTPATIENT)
Dept: PEDIATRICS | Facility: CLINIC | Age: 43
End: 2023-08-21
Payer: COMMERCIAL

## 2023-08-21 NOTE — CONFIDENTIAL NOTE
Neuropsych referral declined- referring provider notified. At this time our clinic does not see patients to provide differential diagnoses of (mood disorders, psychiatric disorders) such as (anxiety, depression, bipolar, OCD). Questions of this nature are more appropriately assessed through psychological testing. Please refer to psychiatry for this type of assessment. I've included their phone number below.     [facility] - with locations throughout the [facility] area: [phone].      Lauren Soto   Psychometrist

## 2023-08-21 NOTE — TELEPHONE ENCOUNTER
Please call - I was contacted by scheduling and advised to switch the order from neuropsych testing to psychological testing, as neuropsych providers do not assess for chronic fatigue syndrome.    Maryuri Jonas MD

## 2023-08-21 NOTE — TELEPHONE ENCOUNTER
----- Message from Lauren Soto sent at 8/21/2023  2:49 PM CDT -----  Regarding: Neuropsych Referral  Dear Dr. Jonas,    Thank you for your referral to the [facility]. At this time our clinic does not see patients to provide differential diagnoses of (mood disorders, psychiatric disorders) such as (anxiety, depression, bipolar, OCD). Questions of this nature are more appropriately assessed through psychological testing. Please refer to psychiatry for this type of assessment. I've included their phone number below.     [facility] - with locations throughout the [facility] area: [phone].    Sincerely,  Lauren Soto   Psychometrist

## 2023-08-23 NOTE — TELEPHONE ENCOUNTER
Called and spoke with patient, informed of change in order. No further questions at this time.     Kal COLEMAN RN 8/23/2023 at 2:30 PM

## 2023-08-25 DIAGNOSIS — F41.1 GAD (GENERALIZED ANXIETY DISORDER): ICD-10-CM

## 2023-08-25 DIAGNOSIS — F32.1 CURRENT MODERATE EPISODE OF MAJOR DEPRESSIVE DISORDER WITHOUT PRIOR EPISODE (H): ICD-10-CM

## 2023-08-25 NOTE — TELEPHONE ENCOUNTER
Prior Authorization Retail Medication Request    Medication/Dose: busPIRone (BUSPAR) 5 MG tablet  ICD code (if different than what is on RX):  [F41.1]    Previously Tried and Failed:  see chart  Rationale:  see chart    Insurance Name:  [employer]   Insurance ID:  [id number]       Pharmacy Information (if different than what is on RX)  Name:    [facility] [id number] [place], [place] - [address]     Phone:  [phone]      20

## 2023-08-28 RX ORDER — BUSPIRONE HYDROCHLORIDE 5 MG/1
TABLET ORAL
Qty: 90 TABLET | Refills: 0 | Status: CANCELLED | OUTPATIENT
Start: 2023-08-28

## 2023-08-29 ENCOUNTER — TELEPHONE (OUTPATIENT)
Dept: PEDIATRICS | Facility: CLINIC | Age: 43
End: 2023-08-29
Payer: COMMERCIAL

## 2023-08-31 NOTE — TELEPHONE ENCOUNTER
Prior Authorization Not Needed per Insurance    Medication: busPIRone (BUSPAR) 5 MG tablet   Insurance Company: [employer] - Phone [phone] Fax [phone]  Expected CoPay:      Pharmacy Filling the Rx: [id number] [place] [facility] [address]   Pharmacy Notified: Yes  Patient Notified: Yes  **Instructed pharmacy to notify patient when script is ready to /ship.**

## 2023-08-31 NOTE — TELEPHONE ENCOUNTER
Central Prior Authorization Team   Phone: [phone]    PA Initiation    Medication: busPIRone (BUSPAR) 5 MG tablet   Insurance Company: [employer] - Phone [phone] Fax [phone]  Pharmacy Filling the Rx: [id number] [place], [place] - [address] [facility]  Filling Pharmacy Phone: [phone]  Filling Pharmacy Fax:    Start Date: 8/31/2023

## 2023-09-15 ENCOUNTER — VIRTUAL VISIT (OUTPATIENT)
Dept: PEDIATRICS | Facility: CLINIC | Age: 43
End: 2023-09-15
Payer: COMMERCIAL

## 2023-09-15 DIAGNOSIS — F41.1 GAD (GENERALIZED ANXIETY DISORDER): ICD-10-CM

## 2023-09-15 PROCEDURE — 99213 OFFICE O/P EST LOW 20 MIN: CPT | Mod: VID | Performed by: INTERNAL MEDICINE

## 2023-09-15 RX ORDER — BUSPIRONE HYDROCHLORIDE 30 MG/1
30 TABLET ORAL 2 TIMES DAILY
Qty: 60 TABLET | Refills: 0 | Status: SHIPPED | OUTPATIENT
Start: 2023-09-15 | End: 2023-10-09

## 2023-09-15 ASSESSMENT — ANXIETY QUESTIONNAIRES
2. NOT BEING ABLE TO STOP OR CONTROL WORRYING: NOT AT ALL
1. FEELING NERVOUS, ANXIOUS, OR ON EDGE: SEVERAL DAYS
IF YOU CHECKED OFF ANY PROBLEMS ON THIS QUESTIONNAIRE, HOW DIFFICULT HAVE THESE PROBLEMS MADE IT FOR YOU TO DO YOUR WORK, TAKE CARE OF THINGS AT HOME, OR GET ALONG WITH OTHER PEOPLE: SOMEWHAT DIFFICULT
6. BECOMING EASILY ANNOYED OR IRRITABLE: SEVERAL DAYS
5. BEING SO RESTLESS THAT IT IS HARD TO SIT STILL: NOT AT ALL
GAD7 TOTAL SCORE: 2
4. TROUBLE RELAXING: NOT AT ALL
GAD7 TOTAL SCORE: 2
7. FEELING AFRAID AS IF SOMETHING AWFUL MIGHT HAPPEN: NOT AT ALL
3. WORRYING TOO MUCH ABOUT DIFFERENT THINGS: NOT AT ALL

## 2023-09-15 ASSESSMENT — PATIENT HEALTH QUESTIONNAIRE - PHQ9
10. IF YOU CHECKED OFF ANY PROBLEMS, HOW DIFFICULT HAVE THESE PROBLEMS MADE IT FOR YOU TO DO YOUR WORK, TAKE CARE OF THINGS AT HOME, OR GET ALONG WITH OTHER PEOPLE: VERY DIFFICULT
SUM OF ALL RESPONSES TO PHQ QUESTIONS 1-9: 9
SUM OF ALL RESPONSES TO PHQ QUESTIONS 1-9: 9

## 2023-09-15 NOTE — PROGRESS NOTES
Melinda is a 42 year old who is being evaluated via a billable video visit.        Assessment & Plan     WILFREDO (generalized anxiety disorder)  Didn't feel like the 15 mg BID was helpful, but also didn't have any side effects.  Will increase to 30 mg BID and she will contact me via [employer] if helpful.  Otherwise, will f/up with psych appt next month.    Has psychological testing scheduled for June 2024.    F/up with me in Dec for annual.    - busPIRone (BUSPAR) 30 MG tablet; Take 1 tablet (30 mg) by mouth 2 times daily         FUTURE APPOINTMENTS:       - Follow-up for annual visit or as needed    Maryuri Jonas MD  [facility]    Ryne Lawrence is a 42 year old, presenting for the following health issues:  No chief complaint on file.      History of Present Illness       Mental Health Follow-up:  Patient presents to follow-up on Depression & Anxiety.Patient's depression since last visit has been:  Better  The patient is not having other symptoms associated with depression.  Patient's anxiety since last visit has been:  No change  The patient is not having other symptoms associated with anxiety.  Any significant life events: No  Patient is not feeling anxious or having panic attacks.  Patient has no concerns about alcohol or drug use.    She eats 2-3 servings of fruits and vegetables daily.She consumes 1 sweetened beverage(s) daily.She exercises with enough effort to increase her heart rate 20 to 29 minutes per day.  She exercises with enough effort to increase her heart rate 4 days per week.   She is taking medications regularly.               Review of Systems   Constitutional, HEENT, cardiovascular, pulmonary, gi and gu systems are negative, except as otherwise noted.      Objective           Vitals:  No vitals were obtained today due to virtual visit.    Physical Exam   GENERAL: Healthy, alert and no distress  EYES: Eyes grossly normal to inspection.  No discharge or erythema, or obvious  scleral/conjunctival abnormalities.  RESP: No audible wheeze, cough, or visible cyanosis.  No visible retractions or increased work of breathing.    SKIN: Visible skin clear. No significant rash, abnormal pigmentation or lesions.  NEURO: Cranial nerves grossly intact.  Mentation and speech appropriate for age.  PSYCH: Mentation appears normal, affect normal/bright, judgement and insight intact, normal speech and appearance well-groomed.                Video-Visit Details    Type of service:  Video Visit     Originating Location (pt. Location): Home    Distant Location (provider location):  Off-site  Platform used for Video Visit: [employer]

## 2023-10-04 DIAGNOSIS — F32.1 CURRENT MODERATE EPISODE OF MAJOR DEPRESSIVE DISORDER WITHOUT PRIOR EPISODE (H): ICD-10-CM

## 2023-10-04 DIAGNOSIS — F41.1 GAD (GENERALIZED ANXIETY DISORDER): ICD-10-CM

## 2023-10-08 ASSESSMENT — PATIENT HEALTH QUESTIONNAIRE - PHQ9
SUM OF ALL RESPONSES TO PHQ QUESTIONS 1-9: 10
SUM OF ALL RESPONSES TO PHQ QUESTIONS 1-9: 10
10. IF YOU CHECKED OFF ANY PROBLEMS, HOW DIFFICULT HAVE THESE PROBLEMS MADE IT FOR YOU TO DO YOUR WORK, TAKE CARE OF THINGS AT HOME, OR GET ALONG WITH OTHER PEOPLE: VERY DIFFICULT

## 2023-10-09 ENCOUNTER — VIRTUAL VISIT (OUTPATIENT)
Dept: PSYCHIATRY | Facility: CLINIC | Age: 43
End: 2023-10-09
Payer: COMMERCIAL

## 2023-10-09 DIAGNOSIS — R53.82 CHRONIC FATIGUE: ICD-10-CM

## 2023-10-09 DIAGNOSIS — F32.1 CURRENT MODERATE EPISODE OF MAJOR DEPRESSIVE DISORDER WITHOUT PRIOR EPISODE (H): Primary | ICD-10-CM

## 2023-10-09 DIAGNOSIS — F41.1 GAD (GENERALIZED ANXIETY DISORDER): ICD-10-CM

## 2023-10-09 DIAGNOSIS — F40.10 SOCIAL ANXIETY DISORDER: ICD-10-CM

## 2023-10-09 PROCEDURE — 99204 OFFICE O/P NEW MOD 45 MIN: CPT | Mod: VID | Performed by: NURSE PRACTITIONER

## 2023-10-09 RX ORDER — BUPROPION HYDROCHLORIDE 150 MG/1
150 TABLET ORAL EVERY MORNING
Qty: 30 TABLET | Refills: 1 | Status: SHIPPED | OUTPATIENT
Start: 2023-10-09 | End: 2023-11-20

## 2023-10-09 RX ORDER — BUSPIRONE HYDROCHLORIDE 30 MG/1
TABLET ORAL
Qty: 60 TABLET | Refills: 0
Start: 2023-10-09 | End: 2023-11-20

## 2023-10-09 RX ORDER — BUPROPION HYDROCHLORIDE 300 MG/1
300 TABLET ORAL EVERY MORNING
Qty: 90 TABLET | Refills: 3 | Status: SHIPPED | OUTPATIENT
Start: 2023-10-09 | End: 2024-04-02

## 2023-10-09 ASSESSMENT — PAIN SCALES - GENERAL: PAINLEVEL: NO PAIN (0)

## 2023-10-09 NOTE — Clinical Note
Thank you for the Psychiatry referral to the [facility] ([facility]). I saw Melinda for medication management today. Our psychiatry providers act as a specialty service for Primary Care Providers in the [facility] who seek to optimize medications for unstable patients.  Once medications have been optimized, our providers discharge the patient back to the referring Primary Care Provider for ongoing medication management. This type of system allows our providers to serve a high volume of patients.   Please see my Impression and Plan. I will continue to work with them in stabilizing their mood. If you have any questions or concerns, please let me know. Thank you again!  GOKUL Clay, CNP, PMHNP Collaborative Care Psychiatry Service ([facility])  [facility]

## 2023-10-09 NOTE — PATIENT INSTRUCTIONS
Swapnil Lawrence,    Thank you for our time together today in Collaborative Care Psychiatry Service (CCPS). CCPS provides brief psychiatric medication stabilization to patients referred by their Primary Care Providers. Patients are typically seen in CCPS for a few appointments and then referred back to their PCP for ongoing refills unless longer term medication management by a specialist is indicated. If I believe you will benefit from long-term psychiatric care I will discuss this with you. If you are interested in seeing a psychiatrist or psychiatric nurse practitioner long-term, please send me a message in [place] so we can refer you appropriately.     TREATMENT PLAN TODAY   Follow up in 6 weeks or 2 months (or sooner as needed)  Medication changes   INCREASE Bupropion XL to 450mg a day (300mg +150mg tab)  TAPER Buspirone 30mg tabs, Take 2/3 tab (20mg) TWICE DAILY for 1 week, then take 1/3 of tab 10mg TWICE DAILY for 1 week then stop  Continue propranolol 10mg QID as needed anxiety, okay to take in evening, okay to try 2 tabs  Additional information:   [email or web address]  [email or web address]    Communication  Call the psychiatric nurse line with medication questions or concerns at [phone] or [phone].  [place] may be used to communicate with your care team, but this is not intended to be used for emergencies.  You can call the above number to make appointments, leave a message with our nursing team, and inquire about any mental health referrals I have placed.  Please call your pharmacy to request a refill of your medications listed above if needed between appointments.   Safety Plan - see below for crisis resources   Call or text [phone] for mental health crisis.   Call 911 or use ER for potentially life-threatening situations.            RESOURCES     Crisis Resources  For emergency help, please call 911 or go to the nearest Emergency Department.     Emergency Walk-In  Options:   [facility] Unit @ [facility] ([place]): [phone] - Specialized mental health emergency area designed to be calming  [facility] ([place]): [phone]  [facility] ([place]): [phone]  [facility] ([place]): [phone]    County Crisis Information:   [place]: [phone]  [place]: [phone]  [place] ([place]) - Adult: [phone]     Child: [phone]  [place] - Adult: [phone]     Child: [phone]  [place]: [phone]  List of all [facility] resources:   https://[place].gov/dhs/people-we-serve/adults/health-care/mental-health/resources/crisis-contacts.jsp    National Crisis Information:   National Suicide & Crisis Lifeline: Call 308   For online chat options, visit https://suicidepreventionlifeline.org/chat/  Poison Control Center: 5-320-979-7994  Poison Control Center: 0-696-643-9217  Trans Lifeline: 1-689.896.2935 - Hotline for transgender people of all ages  [employer]: 1-543.210.7409 - Hotline for LGBT youth     For Non-Emergency Support:   Fast Tracker: Mental Health & Substance Use Disorder Resources -   https://www.[employer]n.org/    Additional Resources  Financial Assistance [phone]  [facility] Billing [phone]  Central Billing Office, [facility]: [phone]  [facility] Billing [phone]  Medical Records [phone]  [facility] Patient Bill of Rights [email or web address]        Again thank you for choosing [facility]  [address]  [address]  [place] [address]  Phone: [phone]  Fax: [phone] and please let us know how we can best partner with you to improve you and your family's health.    You may be receiving a survey regarding this appointment. We would love to have your feedback, both positive and negative. The survey is done by an external company, so your answers are  "anonymous. Patient Education   Collaborative Care Psychiatry Service  What to Expect  Here's what to expect from your Collaborative Care Psychiatry Service (CCPS).   About CCPS  CCPS means 2 people work together to help you get better. You'll meet with a behavioral health clinician and a psychiatric doctor. A behavioral health clinician helps people with mental health problems by talking with them. A psychiatric doctor helps people by giving them medicine.  How it works  At every visit, you'll see the behavioral health clinician (BHC) first. They'll talk with you about how you're doing and teach you how to feel better.   Then you'll see the psychiatric doctor. This doctor can help you deal with troubling thoughts and feelings by giving you medicine. They'll make sure you know the plan for your care.   CCPS usually takes 3 to 6 visits. If you need more visits, we may have you start seeing a different psychiatric doctor for ongoing care.  If you have any questions or concerns, we'll be glad to talk with you.  About visits  Be open  At your visits, please talk openly about your problems. It may feel hard, but it's the best way for us to help you.  Cancelling visits  If you can't come to your visit, please call us right away at [phone]. If you don't cancel at least 24 hours (1 full day) before your visit, that's \"late cancellation.\"  Being late to visits  Being very late is the same as not showing up. You will be a \"no show\" if:  Your appointment starts with a BHC, and you're more than 15 minutes late for a 30-minute (half hour) visit. This will also cancel your appointment with the psychiatric doctor.  Your appointment is with a psychiatric doctor only, and you're more than 15 minutes late for a 30-minute (half hour) visit.  Your appointment is with a psychiatric doctor only, and you're more than 30 minutes late for a 60-minute (full hour) visit.  If you cancel late or don't show up 2 times within 6 months, we " may end your care.   Getting help between visits  If you need help between visits, you can call us Monday to Friday from 8 a.m. to 4:30 p.m. at [phone].  Emergency care  Call 911 or go to the nearest emergency department if your life or someone else's life is in danger.  Call 988 anytime to reach the national Suicide and Crisis hotline.  Medicine refills  To refill your medicine, call your pharmacy. You can also call [facility]'s Behavioral Access at [phone], Monday to Friday, 8 a.m. to 4:30 p.m. It can take 1 to 3 business days to get a refill.   Forms, letters, and tests  You may have papers to fill out, like FMLA, short-term disability, and workability. We can help you with these forms at your visits, but you must have an appointment. You may need more than 1 visit for this, to be in an intensive therapy program, or both.  Before we can give you medicine for ADHD, we may refer you to get tested for it or confirm it another way.  We may not be able to give you an emotional support animal letter.  We don't do mental health checks ordered by the court.   We don't do mental health testing, but we can refer you to get tested.   Thank you for choosing us for your care.  For informational purposes only. Not to replace the advice of your health care provider. Copyright   2022 [facility]. All rights reserved. [place] 303178 - 12/22.     Serotonin syndrome Information   Overview  Serotonin syndrome is a serious drug reaction. It is caused by medications that build up high levels of serotonin in the body.    Serotonin is a chemical that the body produces naturally. It's needed for the nerve cells and brain to function. But too much serotonin causes signs and symptoms that can range from mild (shivering and diarrhea) to severe (muscle rigidity, fever and seizures). Severe serotonin syndrome can cause death if not treated.    Serotonin syndrome can occur when you increase the dose of  certain medications or start taking a new drug. It's most often caused by combining medications that contain serotonin, such as a migraine medication and an antidepressant. Some illicit drugs and dietary supplements are associated with serotonin syndrome.    Milder forms of serotonin syndrome may go away within a day or two of stopping the medications that cause symptoms and, sometimes, after taking drugs that block serotonin.    Symptoms  Serotonin syndrome symptoms usually occur within several hours of taking a new drug or increasing the dose of a drug you're already taking.    Signs and symptoms include:  Agitation or restlessness  Insomnia  Confusion  Rapid heart rate and high blood pressure  Dilated pupils  Loss of muscle coordination or twitching muscles  High blood pressure  Muscle rigidity  Heavy sweating  Diarrhea  Headache  Shivering  Goose bumps  Severe serotonin syndrome can be life-threatening. Signs include:  High fever  Tremor  Seizures  Irregular heartbeat  Unconsciousness    When to see a doctor  If you suspect you might have serotonin syndrome after starting a new drug or increasing the dose of a drug you're already taking, call your health care provider right away or go to the emergency room. If you have severe or rapidly worsening symptoms, seek emergency treatment immediately.    Causes  Excessive accumulation of serotonin in the body creates the symptoms of serotonin syndrome.    Typically, nerve cells in the brain and spinal cord produce serotonin that helps regulate attention, behavior and body temperature.  Other nerve cells in the body, primarily in the intestines, also produce serotonin. Serotonin plays a role in regulating the digestive process, blood flow and breathing.  Although it's possible that taking just one drug that increases serotonin levels can cause serotonin syndrome in some people, this condition occurs most often when people combine certain medications.  For example,  serotonin syndrome may occur if you take an antidepressant with a migraine medication. It may also occur if you take an antidepressant with an opioid pain medication.  Another cause of serotonin syndrome is intentional overdose of antidepressant medications.    A number of over-the-counter and prescription drugs may be associated with serotonin syndrome, especially antidepressants. Illicit drugs and dietary supplements also may be associated with the condition.    The drugs and supplements that could potentially cause serotonin syndrome include:    Selective serotonin reuptake inhibitors (SSRIs), antidepressants such as citalopram (Celexa), fluoxetine (Prozac), fluvoxamine (Luvox), escitalopram (Lexapro), paroxetine (Paxil, Pexeva, Brisdelle) and sertraline (Zoloft)  Serotonin and norepinephrine reuptake inhibitors (SNRIs), antidepressants such as desvenlafaxine (Pristiq), levomilnacipran (Fetzima), milnacipran (Savella), duloxetine (Cymbalta, Drizalma Sprinkle) and venlafaxine (Effexor XR)  Bupropion (Zyban, Wellbutrin SR, Wellbutrin XL), an antidepressant and tobacco-addiction medication  Tricyclic antidepressants, such as amitriptyline and nortriptyline (Pamelor)  Monoamine oxidase inhibitors (MAOIs), antidepressants such as isocarboxazid (Marplan) and phenelzine (Nardil)  Anti-migraine medications, such as carbamazepine (Tegretol, Carbatrol, others), valproic acid and triptans, which include almotriptan, naratriptan (Amerge) and sumatriptan (Imitrex, Tosymra, others)  Pain medications, such as opioid pain medications including codeine, fentanyl (Duragesic, Abstral, others), hydrocodone (Hysingla ER), meperidine (Demerol), oxycodone (Oxycontin, Roxicodone, others) and tramadol (Ultram, ConZip)  Lithium (Lithobid), a mood stabilizer  Illicit drugs, including LSD, ecstasy, cocaine and amphetamines  Herbal supplements, including [place]'s wort, ginseng and nutmeg  Over-the-counter cough and cold medications  containing dextromethorphan (Delsym)  Anti-nausea medications such as granisetron (Sancuso, Sustol), metoclopramide (Reglan), droperidol (Inapsine) and ondansetron (Zofran)  Linezolid (Zyvox), an antibiotic  Ritonavir (Norvir), an anti-retroviral medication used to treat human immunodeficiency virus (HIV)  Risk factors  Some people are more likely to be affected by the drugs and supplements that cause serotonin syndrome than are others, but the condition can occur in anyone.    You're at increased risk of serotonin syndrome if:  You recently started taking or increased the dose of a medication known to increase serotonin levels  You take more than one drug known to increase serotonin levels  You take herbal supplements known to increase serotonin levels  You use an illicit drug known to increase serotonin levels  Complications  Serotonin syndrome generally doesn't cause any problems once serotonin levels are back to their original levels.  If left untreated, severe serotonin syndrome can lead to unconsciousness and death.    Prevention  Taking more than one serotonin-related medication or increasing your dose of a serotonin-related medication increases your risk of serotonin syndrome. Know what medications you take and share a complete list of your medications with your doctor or pharmacist.  Be sure to talk to your doctor if you or a family member has experienced symptoms after taking a medication.  Also talk to your doctor about possible risks. Don't stop taking any medications on your own. If your doctor prescribes a new medication, make sure he or she knows about all the other medications you're taking, especially if you receive prescriptions from more than one doctor.  If you and your doctor decide the benefits of combining certain serotonin-level-affecting drugs outweigh the risks, be alert to the possibility of serotonin syndrome.    By [facility] Staff  Reference:  [email or web address]/diseases-conditions/serotonin-syndrome/symptoms-causes/[email or web address]?[email or web address]

## 2023-10-09 NOTE — PROGRESS NOTES
"Virtual Visit Details    Type of service:  Video Visit     Originating Location (pt. Location): Home    Distant Location (provider location):  Off-site  Platform used for Video Visit: [facility] [place] [address], [place], [place], [id number] [place] [address]  [phone]  [phone]   10/9/2023  Provider: GOKUL Torres CNP    Mode of Visit: Telemedicine Visit: The patient's condition can be safely assessed and treated via synchronous audio and visual telemedicine encounter. Reason for Telemedicine Visit: Patient convenience (e.g. access to timely appointments / distance to available provider)  Consent:  The patient/guardian has verbally consented to: the potential risks and benefits of telemedicine (video visit or phone) versus in person care; bill my insurance or make self-payment for services provided; and responsibility for payment of non-covered services.  As the provider I attest to compliance with applicable laws and regulations related to telemedicine.    Collaborative Care Psychiatry Service (CCPS)  Psychiatric Evaluation    IDENTIFYING DATA   Name:  Yvette Garcia Preferred name: Melinda  : 1980 MRN: [id number]    Melinda is a 42 year old, , White, [facility] Melinda currently lives in [place] [address] with Spouse/Partner and Children. 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 Collaborative Care Psychiatry Service (CCPS) model of care reviewed with patient/guardian who verbalized understanding. (CCPS is a short term psychiatric stabilization model of care in coordination with primary care). The patient attended the session alone.     PCP: Maryuri Jonas MD   Psychotherapist: none currently   CHIEF COMPLAINT    \" Anxiety/ depression which doesnt seem well controlled on current medications  \"   HISTORY OF PRESENT ILLNESS   She reports she has always " "struggled with being tired and exhausted. College 3-4hr naps. Since having kids she can't nap and has been working with PCP to figure out things. Ruled out most medical causes, sleep study, labs etc. Referred to psychological testing for dx clarification, scheduled in 7/2024.      Snowball effect, feels like she can't parent, short with kids and , can't keep up with household tasks. Feeling tired makes her feel irritable. Would rather lay in bed. She used to be into hobbies and lately would rather be sleeping. Winter does not help.   She reports a significant amount of anxiety especially with social situations, feeling irritable a lot. Sometimes struggles with \"why am I here\". She feels like the social anxiety. She feels she has to be drinking in social situations otherwise she is too anxious. Can handle 1-2 people. School functions is uncomfortable and on edge. Recently she has been drinking more as a crutch. In social situations needs to have a drink, if irritated with kids will have a glass of wine to manage. Drinking one glass about once or twice a week. She would like to not have to use it at much.     She was started on Celexa about 5 years ago for the first time, it did seem to help. Was feeling very down in August and saw PA. Later she started fluoxetine about 1 year ago, was on Celexa before. She didn't notice much of a change from this medication. Bupropion (wellbutrin XL) was added with Celexa with attempts for it to be more stimulating for energy. Seemed helpful in the beginning. She started on buspirone within the last 3 months. No noted benefits with buspirone. She does take propranolol as needed, notes it helps with how her body responds.       Current stressors include: Symptoms, Interactions with children and parent. Oldest daughter relationship strains  Sleeps: Tried all the time, could sleep. Sleeping 10 hours at night on average, nap for 3-4 hours a day.    Working 48 hours a month "   Appetite:no concerns  Suicidal Ideation: Denies Plan or intent, passive SI, struggling with symptoms.   Medication side effects: Denies  Medication adherence: Reports good med adherence.    PHQ9      10/8/2023     5:40 PM 9/15/2023     8:59 AM 8/17/2023     1:56 PM   PHQ   PHQ-9 Total Score 10 9 13   Q9: Thoughts of better off dead/self-harm past 2 weeks Not at all Not at all Several days   F/U: Thoughts of suicide or self-harm   No   F/U: Safety concerns   No     GAD7      9/15/2023     8:59 AM 8/17/2023     2:02 PM 1/6/2023     8:05 AM   WILFREDO-7 SCORE   Total Score 2 (minimal anxiety) 10 (moderate anxiety) 1 (minimal anxiety)   Total Score 2 10 1     PSYCHIATRIC REVIEW OF SYMPTOMS   Comprehensive review of symptoms completed, pertinent positives noted below  Depression: Change in sleep, Lack of interest, Change in energy level, Change in appetite, Feelings of hopelessness, Low self-worth, Ruminations, Irritability, Feeling sad, down, or depressed, and Withdrawn  Radha: No Symptoms  Psychosis:No Symptoms  Anxiety: Excessive worry, Nervousness, Social anxiety, and Irritability   Panic: no symptoms  OCD: No Symptoms   Trauma: No Symptoms  Eating D/O: No Symptoms  Disruptive/Impulse/Conduct: No symptoms  ADHD: Poor task completion  MEDICATIONS   Medications Prior to Appointment  Current Outpatient Medications   Medication    acetaminophen (TYLENOL) 325 MG tablet    buPROPion (WELLBUTRIN XL) 300 MG 24 hr tablet    busPIRone (BUSPAR) 30 MG tablet    cetirizine (ZYRTEC) 10 MG tablet    desogestrel-ethinyl estradiol (KARIVA) 0.15-0.02/0.01 MG (21/5) tablet    FLUoxetine (PROZAC) 40 MG capsule    ibuprofen (ADVIL,MOTRIN) 400 MG tablet    propranolol (INDERAL) 10 MG tablet     No current facility-administered medications for this visit.        Psychotropic medications at evaluation 10/9/2023   Wellbutrin XL 150mg a day   Buspirone 30mg TWICE DAILY   Prozac 40mg a day   Propranolol 10mg QID AS NEEDED anxiety   Past  Psychotropic Medication Trials  Citalopram (Celexa) stopped 1/2023 up to 30mg a day       reviewed - no record of controlled substances prescribed  REVIEW OF SYMPTOMS   10 systems (general, cardiovascular, respiratory, eyes, ENT, endocrine, GI, , M/S, neurological) were reviewed.   Review of Systems   Constitutional:  Positive for malaise/fatigue.   All other systems reviewed and are negative.     The remaining systems are all unremarkable.  Contraception:oral contraceptives (combined) No LMP recorded.  Pregnancy status: Not pregnant   PAST MEDICAL HISTORY    Seizures: No  Head Injuries: No  Cardiac events: No    Primary Care Provider: Salud Jonas Mai  Past Medical History:   Diagnosis Date    Depressive disorder     History of asthma     as a child    Uncomplicated asthma 1992    outgrown     Past Surgical History:   Procedure Laterality Date    CENTRAL LINE      with hemorrhage after delivery of baby    LAPAROSCOPY DIAGNOSTIC (GYN) Left 8/27/2017    Procedure: LAPAROSCOPY DIAGNOSTIC (GYN);  DIAGNOSTIC LAPAROSCOPY, LEFT SALPINGECTOMY;  Surgeon: Yvette Tong MD;  Location: [facility] OR    wisdom teeth  2003    [facility] ORAL SURGERY PROCEDURE  1998     Allergies:   Allergies   Allergen Reactions    Seasonal Allergies      FAMILY HISTORY     Family History   Problem Relation Age of Onset    Breast Cancer Maternal Aunt         post-menopausal    Breast Cancer Paternal Grandmother         post-menopausal    Hyperlipidemia Mother     Asthma Mother     Sleep Apnea Mother     Hyperlipidemia Father     Depression Father     Myocardial Infarction Paternal Grandfather         60's     Sister: Sudden onset mental health issues - depression and anxiety [facility], Outpatient  Father: Depression  Paternal: Uncle had ECT treatment   Substance use: Extended family substance family issues  Suicide: denies  VITALS / LABS     BP Readings from Last 1 Encounters:   05/30/23 110/60       Pulse Readings from Last 1 Encounters:   10/25/22  "108     Wt Readings from Last 1 Encounters:   05/30/23 63.5 kg (140 lb)       Ht Readings from Last 1 Encounters:   05/30/23 1.676 m (5' 6\")     Estimated body mass index is 22.6 kg/m  as calculated from the following:    Height as of 5/30/23: 1.676 m (5' 6\").    Weight as of 5/30/23: 63.5 kg (140 lb).    Most recent laboratory results reviewed and pertinent results include: Reviewed  Recent Labs   Lab Test 12/23/21  0832 05/15/18  1046 08/27/17  0240   WBC 8.7   < > 12.0*   HGB 14.3   < > 11.3*   HCT 43.9   < > 32.8*   MCV 94   < > 92      < > 274   ANEU  --   --  9.6*    < > = values in this interval not displayed.     Recent Labs   Lab Test 12/23/21  0832      POTASSIUM 3.8   CHLORIDE 105   CO2 27   *   BRANDIE 9.1   BUN 8   CR 0.91   GFRESTIMATED 82   ALBUMIN 3.5   PROTTOTAL 7.3   AST 23   ALT 53*   ALKPHOS 84   BILITOTAL 0.3     Recent Labs   Lab Test 08/27/21  0746 05/15/18  1011   CHOL 190  --    *  --    HDL 53  --    TRIG 148  --    A1C  --  5.0     Recent Labs   Lab Test 10/24/19  1008   TSH 1.27     Recent Labs   Lab Test 10/24/19  1008   VITDT 27       EKG: Most recent EKG from 9/11/2019 reviewed. QTc interval 401.  Sinus  Rhythm   -RSR(V1) -nondiagnostic.   SOCIAL HISTORY   Pt was raised in [place] by biological parents. Parents were . Pt has 2 siblings.   Cultural/Spiritual/ethnic background:    \"Grew up very active Buddhist, now have many issues with Religious Lutheran \"  Highest level of education completed: College Graduate  Trauma/Abuse history: Denies    Relationship status:  . Pt has 3 children. Ages 12,10 and 7  Supports: partner/spouse, friends, parent/s, and siblings     history: No  Legal History: No: Patient denies any legal history  Firearms: No  PAST PSYCHIATRIC HISTORY   Past Dx: Anxiety and Depression  Past Treatment: No past services noted    No therapy in the past  Psychiatric hospitalizations: No  Suicidal attempts: NO  Self injurious " "behaviors: Denies   History of Violence/Aggression: No   Homicidal Ideation: Denies  SUBSTANCE USE HISTORY   Concerns with current alcohol use  Alcohol: Currently using, started age 18  CAGE AID: 2 10/8/23   Nicotine: denies    THC: Last used 3/2023, started age 37  Recreational substances: denies  Caffeine: Currently using  MENTAL STATUS EXAMINATION   Appearance: Awake, alert, adequately groomed, appeared stated age  Behavior: cooperative and pleasant  Eye Contact:  intact  Gait and motor coordination: normal   Psychomotor Behavior:  no evidence of tardive dyskinesia, dystonia, or tics  Attention and Concentration: Good  Speech: Clear, coherent, regular rate, rhythm and volume  Mood:  \"okay\"  Affect:  blunted  Associations:  no loose associations  Orientation: oriented to time, place and person  Thought process:  logical, linear, and goal-directed  Thought content: no evidence of suicidal ideation or homicidal ideation  Does not appear to be responding to internal stimuli.    Memory: Grossly intact as assessed by interview. Not formally assessed  Fund of knowledge: Average  Insight: Good  Judgement: Good    DIAGNOSIS   DSM   WILFREDO (generalized anxiety disorder)  Chronic fatigue       Medical Comorbidities: has Current moderate episode of major depressive disorder without prior episode (H) and WILFREDO (generalized anxiety disorder) on their problem list.    ASSESSMENT   Formulation/MDM: Today Melinda presents to CCPS with social anxiety, depression and fatigue symptoms. family history of psychiatric disorders.  No noted precipitating factors other than parenting stressors . Perpetuating factors that maintain disabling symptoms include: severity of symptoms, ongoing transitions and stressors, and maladaptive coping mechanisms. healthy diet, family support, has outpatient healthcare team, and good overall health . Their prognosis is good due to protective factors as mentioned. Patient Status: Patient will continue to be seen " for ongoing consultation and stabilization. Ongoing fatigue, depression and untreated social anxiety. Recommend therapy. Discussed integrative/functional medicine due to recent start of symptoms 5 years ago. Discussed and recommended pharmacogenomic testing. Plan to taper off of buspirone, increase Bupropion XL and reji tapering off of fluoxetine to Vilazodone (Viibryd)/trintellix for next medication change.     Safety: In addition, Melinda has notable risk factors for self-harm, including age and anxiety. However, risk is mitigated by Having people in his/her life that would prevent the patient from considering committing suicide (i.e. young children, spouse, parents, etc.), commitment to family. Therefore, based on all available evidence including the factors cited above, Melinda does not appear to be at imminent risk for self-harm, does not meet criteria for a 72-hr hold, and therefore remains appropriate for ongoing outpatient level of care. There was no deceit detected, and the patient presented in a manner that was believable. Local community safety resources reviewed for patient to use if needed. Recommended that patient/guardian call 911 or go to the local ED for worsening thoughts or actions of harm towards self or others.     Psychoeducation:   Medication side effects and alternatives reviewed.   Medication Education: SEROTONIN SYNDROME:  Discussed risks of Serotonin syndrome (ie, serotonin toxicity) which is a potentially life-threatening condition associated with increased serotonergic activity in the central nervous system (CNS). It is seen with therapeutic medication use, inadvertent interactions between drugs, and intentional self-poisoning. Serotonin syndrome often presents with flu like symptoms, vomiting, diarrhea, eye movements, muscle spasms, sweating, confusion, fever and tremors. Seek emergent care if showing signs.   Reviewed recommended treatment, risks, benefits, and alternatives,  coordination of care with other clinicians, and medication education. Cutting down alcohol use  Health promotion activities recommended and reviewed today, abstaining from substance use.   Call the psychiatric nurse line with medication questions or concerns at [phone] or [phone]. [employer] may be used to communicate with your care team, but this is not intended to be used for emergencies.   All questions addressed.   Assent for medications was provided by patient/guardian today.   Safety Plan reviewed as needed: Call or text 988 for mental health crisis - Call 911 or use ER for potentially life-threatening situations - [place] Crisis Text Line Text [place] to 271908 or Suicide LifeLine Chat: suicidepreventionHelishopterline.org/chat  PLAN   Follow up in 6 weeks or 2 months  Medication Changes  INCREASE Bupropion XL to 450mg a day (300mg +150mg tab)  TAPER Buspirone 30mg tabs, Take 2/3 tab (20mg) TWICE DAILY for 1 week, then take 1/3 of tab 10mg TWICE DAILY for 1 week then stop  Continue propranolol 10mg QID as needed anxiety, okay to take in evening, okay to try 2 tabs  Referrals: Recommend therapy  Pharmacogenomic testing referral submitted ([facility])  Therapy clinics - [place] anxiety/ocd clinics   Labs/Orders: Depression differentials ordered: CMP, CBC/diff, TSH, T4, Vitamin D, Folate, B12, iron  Continue all other treatments (including medications) per primary care provider and/or specialists, follow up with primary care provider as planned or for acute medical concerns.    SIGNED  GOKUL Clay, CNP, PMHNP  Collaborative Care Psychiatry Service (CCPS)  ADMINISTRATIVE BILLING   50 minutes were spent performing chart review, patient assessment, documentation, and case management on the date of service.    Video/Phone Start Time: 1102   Video/Phone End Time: 1143     Disclaimer: This note consists of symbols derived from keyboarding, dictation and/or voice recognition software. As a result, there  may be errors in the script that have gone undetected. Please consider this when interpreting information found in this chart.

## 2023-10-09 NOTE — PROGRESS NOTES
"Virtual Visit Details    Type of service:  Video Visit     Originating Location (pt. Location): {video visit patient location:693845::\"Home\"}  {PROVIDER LOCATION On-site should be selected for visits conducted from your clinic location or adjoining [facility] hospital, academic office, or other nearby [facility] building. Off-site should be selected for all other provider locations, including home:[address]}  Distant Location (provider location):  {virtual location provider:482715}  Platform used for Video Visit: {Virtual Visit Platforms:535113::\"[employer]\"}    "

## 2023-10-09 NOTE — NURSING NOTE
Is the patient currently in the state of [place]? YES    Visit mode:VIDEO    If the visit is dropped, the patient can be reconnected by: VIDEO VISIT: Text to cell phone:   Telephone Information:   Mobile [phone]       Will anyone else be joining the visit? No  (If patient encounters technical issues they should call [phone])    How would you like to obtain your AVS? MyChart    Are changes needed to the allergy or medication list? No    Rooming Documentation: Assigned questionnaire(s) completed .    Reason for visit: Consult     Kamilla Pradhan IRIS    Care team has reviewed attendance agreement with patient. Patient advised that two failed appointments within 6 months may lead to termination of current episode of care.

## 2023-11-15 ENCOUNTER — LAB (OUTPATIENT)
Dept: LAB | Facility: CLINIC | Age: 43
End: 2023-11-15
Payer: COMMERCIAL

## 2023-11-15 DIAGNOSIS — F32.1 CURRENT MODERATE EPISODE OF MAJOR DEPRESSIVE DISORDER WITHOUT PRIOR EPISODE (H): ICD-10-CM

## 2023-11-15 DIAGNOSIS — R53.82 CHRONIC FATIGUE: ICD-10-CM

## 2023-11-15 LAB
ALBUMIN SERPL BCG-MCNC: 4.2 G/DL (ref 3.5–5.2)
ALP SERPL-CCNC: 69 U/L (ref 40–150)
ALT SERPL W P-5'-P-CCNC: 27 U/L (ref 0–50)
ANION GAP SERPL CALCULATED.3IONS-SCNC: 10 MMOL/L (ref 7–15)
AST SERPL W P-5'-P-CCNC: 27 U/L (ref 0–45)
BILIRUB SERPL-MCNC: <0.2 MG/DL
BUN SERPL-MCNC: 10.7 MG/DL (ref 6–20)
CALCIUM SERPL-MCNC: 9.4 MG/DL (ref 8.6–10)
CHLORIDE SERPL-SCNC: 107 MMOL/L (ref 98–107)
CREAT SERPL-MCNC: 0.91 MG/DL (ref 0.51–0.95)
DEPRECATED HCO3 PLAS-SCNC: 25 MMOL/L (ref 22–29)
EGFRCR SERPLBLD CKD-EPI 2021: 81 ML/MIN/1.73M2
FOLATE SERPL-MCNC: 17 NG/ML (ref 4.6–34.8)
GLUCOSE SERPL-MCNC: 102 MG/DL (ref 70–99)
IRON BINDING CAPACITY (ROCHE): 301 UG/DL (ref 240–430)
IRON SATN MFR SERPL: 28 % (ref 15–46)
IRON SERPL-MCNC: 85 UG/DL (ref 37–145)
POTASSIUM SERPL-SCNC: 4.4 MMOL/L (ref 3.4–5.3)
PROT SERPL-MCNC: 6.9 G/DL (ref 6.4–8.3)
SODIUM SERPL-SCNC: 142 MMOL/L (ref 135–145)
T4 FREE SERPL-MCNC: 0.88 NG/DL (ref 0.9–1.7)
TSH SERPL DL<=0.005 MIU/L-ACNC: 3.22 UIU/ML (ref 0.3–4.2)
VIT B12 SERPL-MCNC: 276 PG/ML (ref 232–1245)
VIT D+METAB SERPL-MCNC: 34 NG/ML (ref 20–50)

## 2023-11-15 PROCEDURE — 83550 IRON BINDING TEST: CPT

## 2023-11-15 PROCEDURE — 80053 COMPREHEN METABOLIC PANEL: CPT

## 2023-11-15 PROCEDURE — 83540 ASSAY OF IRON: CPT

## 2023-11-15 PROCEDURE — 82746 ASSAY OF FOLIC ACID SERUM: CPT

## 2023-11-15 PROCEDURE — 82306 VITAMIN D 25 HYDROXY: CPT

## 2023-11-15 PROCEDURE — 82607 VITAMIN B-12: CPT

## 2023-11-15 PROCEDURE — 84439 ASSAY OF FREE THYROXINE: CPT

## 2023-11-15 PROCEDURE — 84443 ASSAY THYROID STIM HORMONE: CPT

## 2023-11-15 PROCEDURE — 36415 COLL VENOUS BLD VENIPUNCTURE: CPT

## 2023-11-20 ENCOUNTER — VIRTUAL VISIT (OUTPATIENT)
Dept: PSYCHIATRY | Facility: CLINIC | Age: 43
End: 2023-11-20
Payer: COMMERCIAL

## 2023-11-20 DIAGNOSIS — F41.1 GAD (GENERALIZED ANXIETY DISORDER): Primary | ICD-10-CM

## 2023-11-20 PROCEDURE — 99214 OFFICE O/P EST MOD 30 MIN: CPT | Mod: VID | Performed by: NURSE PRACTITIONER

## 2023-11-20 RX ORDER — VILAZODONE HYDROCHLORIDE 20 MG/1
20 TABLET ORAL DAILY
Qty: 30 TABLET | Refills: 0 | Status: SHIPPED | OUTPATIENT
Start: 2023-11-20 | End: 2023-12-26

## 2023-11-20 RX ORDER — VILAZODONE HYDROCHLORIDE 10 MG/1
10 TABLET ORAL DAILY
Qty: 7 TABLET | Refills: 0 | Status: SHIPPED | OUTPATIENT
Start: 2023-11-20 | End: 2023-12-12

## 2023-11-20 RX ORDER — FLUOXETINE 10 MG/1
CAPSULE ORAL
Qty: 21 CAPSULE | Refills: 0 | Status: SHIPPED | OUTPATIENT
Start: 2023-11-20 | End: 2023-12-12

## 2023-11-20 NOTE — PATIENT INSTRUCTIONS
Treatment plan today   Follow up in 6 weeks or 2 months (or sooner as needed)  Medication changes   DECREASE Bupropion XL to 300mg a day   Continue propranolol 10mg QID as needed anxiety, okay to take in evening, okay to try 2 tabs  START B complex with L methylfolate  Consider starting multivitamin   START Vitamin D3 2021-5397 international units once a day   START Cross taper  Week 1:   Fluoxetine 10mg cap take 2 caps once a day   Start viibryd 10mg once a day   Week 2  Fluoxetine 10mg once a day   Vilazodone (Viibryd) 20mg once a day   Week 3:   STOP Fluoxetine  Continue viibryd 20mg a day   Communication  Call the psychiatric nurse line with medication questions or concerns at [phone] or [phone].  PlayLab may be used to communicate with your care team, but this is not intended to be used for emergencies.  You can call the above number to make appointments, leave a message with our nursing team, and inquire about any mental health referrals I have placed.  Please call your pharmacy to request a refill of your medications listed above if needed between appointments.   Safety Plan - see below for crisis resources   Call or text 988 for mental health crisis.   Call 911 or use ER for potentially life-threatening situations.       GOKUL Clay, CNP, PMHNP  Collaborative Care Psychiatry Service (CCPS)    [facility]         RESOURCES     Crisis Resources  For emergency help, please call 911 or go to the nearest Emergency Department.     Emergency Walk-In Options:   [facility] ([place]): [phone] - Specialized mental health emergency area designed to be calming  [facility] ([place]): [phone]  [facility] ([place]): [phone]  [facility]): [phone]    [facility] Crisis Information:   [place]: [phone]  [place]: [phone]  [place] (JUAN) - Adult: [phone]     Child: [phone]  [place] -  Adult: [phone]     Child: [phone]  [place]: [phone]  List of all [facility] resources:   https://[place].gov/dhs/people-we-serve/adults/health-care/mental-health/resources/crisis-contacts.jsp    National Crisis Information:   National Suicide & Crisis Lifeline: Call 988   For online chat options, visit https://suicidepreventionlifeline.org/chat/  Poison Control Center: 7-665-642-3732  Poison Control Center: 0-354-853-2973  Trans Lifeline: 9-258-710-9453 - Hotline for transgender people of all ages  The [employer] Project: 1-264.171.6715 - Hotline for LGBT youth     For Non-Emergency Support:   Fast Tracker: Mental Health & Substance Use Disorder Resources -   https://www.Wurln.org/    Additional Resources  Financial Assistance [phone]  [facility] Billing [phone]  Central Billing Office, [facility]: [phone]  [facility] Billing [phone]  Medical Records [phone]  [facility] Patient Bill of Rights [email or web address]         Patient Education   Collaborative Care Psychiatry Service  What to Expect  Here's what to expect from your Collaborative Care Psychiatry Service (CCPS).   About CCPS  CCPS means 2 people work together to help you get better. You'll meet with a behavioral health clinician and a psychiatric doctor. A behavioral health clinician helps people with mental health problems by talking with them. A psychiatric doctor helps people by giving them medicine.  How it works  At every visit, you'll see the behavioral health clinician ([facility]HC) first. They'll talk with you about how you're doing and teach you how to feel better.   Then you'll see the psychiatric doctor. This doctor can help you deal with troubling thoughts and feelings by giving you medicine. They'll make sure you know the plan for your care.   CCPS usually takes 3 to 6 visits. If you need more visits, we may have you start seeing a different psychiatric  "doctor for ongoing care.  If you have any questions or concerns, we'll be glad to talk with you.  About visits  Be open  At your visits, please talk openly about your problems. It may feel hard, but it's the best way for us to help you.  Cancelling visits  If you can't come to your visit, please call us right away at [phone]. If you don't cancel at least 24 hours (1 full day) before your visit, that's \"late cancellation.\"  Being late to visits  Being very late is the same as not showing up. You will be a \"no show\" if:  Your appointment starts with a [facility], and you're more than 15 minutes late for a 30-minute (half hour) visit. This will also cancel your appointment with the psychiatric doctor.  Your appointment is with a psychiatric doctor only, and you're more than 15 minutes late for a 30-minute (half hour) visit.  Your appointment is with a psychiatric doctor only, and you're more than 30 minutes late for a 60-minute (full hour) visit.  If you cancel late or don't show up 2 times within 6 months, we may end your care.   Getting help between visits  If you need help between visits, you can call us Monday to Friday from 8 a.m. to 4:30 p.m. at [phone].  Emergency care  Call 911 or go to the nearest emergency department if your life or someone else's life is in danger.  Call 988 anytime to reach the national Suicide and Crisis hotline.  Medicine refills  To refill your medicine, call your pharmacy. You can also call [facility]'s Behavioral Access at [phone], Monday to Friday, 8 a.m. to 4:30 p.m. It can take 1 to 3 business days to get a refill.   Forms, letters, and tests  You may have papers to fill out, like FMLA, short-term disability, and workability. We can help you with these forms at your visits, but you must have an appointment. You may need more than 1 visit for this, to be in an intensive therapy program, or both.  Before we can give you medicine for ADHD, we may refer you to get " tested for it or confirm it another way.  We may not be able to give you an emotional support animal letter.  We don't do mental health checks ordered by the court.   We don't do mental health testing, but we can refer you to get tested.   Thank you for choosing us for your care.  For informational purposes only. Not to replace the advice of your health care provider. Copyright   2022 [facility]. All rights reserved. [employer] [id number] - 12/22.

## 2023-11-20 NOTE — NURSING NOTE
Is the patient currently in the state of [place]? YES    Visit mode:VIDEO    If the visit is dropped, the patient can be reconnected by: VIDEO VISIT: Text to cell phone:   Telephone Information:   Mobile [phone]       Will anyone else be joining the visit? NO  (If patient encounters technical issues they should call [phone]    How would you like to obtain your AVS? MyChart    Are changes needed to the allergy or medication list? No    Reason for visit: RECHECK    Andria MACIAS

## 2023-11-20 NOTE — PROGRESS NOTES
Virtual Visit Details    Type of service:  Video Visit     Originating Location (pt. Location): Home    Distant Location (provider location):  Off-site  Platform used for Video Visit: [facility] [place] [address], [place], [place], [id number] [place] [address]  [phone]     Mode of Visit: Telemedicine Visit: The patient's condition can be safely assessed and treated via synchronous audio and visual telemedicine encounter.      Collaborative Care Psychiatry Service ([facility]PS)  Outpatient Psychiatric Progress note    IDENTIFYING DATA   Name:  Yvette Garcia   Preferred name: Melinda    : 1980/ 42 year old      Melinda currently lives in [place] [address] with Spouse/Partner and Children. 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     PCP: Maryuri Jonas MD   Psychotherapist: None currently, interested  INTERIM HISTORY   CC: CCPS follow-up appointment  The patient was last seen on 10/09 Consultation, at last visit ordered labs and increased Bupropion XL to 450mg a day   SUBJECTIVE    Melinda presents for return visit with Collaborative Care Psychiatry Service ([facility]PS) for medication management.   She isn't sure if Bupropion XL is helping. Isn't sleeping as well at night, is frequently waking up and shaky more during the day.   Mood has been about the same. Struggling with depression day to day, feeling tired. Can't wait for bedtime every day. Can't wait to get into bed most days. Moments of anxiety, not constant anxiety.     Hair has been thinning, always cold tolerant, fatigue.     No changes in alcohol.   Appetite: normal, the same.   PHQ9      10/8/2023     5:40 PM 9/15/2023     8:59 AM 2023     1:56 PM   PHQ   PHQ-9 Total Score 10 9 13   Q9: Thoughts of better off dead/self-harm past 2 weeks Not at all Not at all Several days   F/U:  Thoughts of suicide or self-harm   No   F/U: Safety concerns   No     GAD7      9/15/2023     8:59 AM 8/17/2023     2:02 PM 1/6/2023     8:05 AM   WILFREDO-7 SCORE   Total Score 2 (minimal anxiety) 10 (moderate anxiety) 1 (minimal anxiety)   Total Score 2 10 1     OBJECTIVE     Current Outpatient Medications   Medication    acetaminophen (TYLENOL) 325 MG tablet    buPROPion (WELLBUTRIN XL) 150 MG 24 hr tablet    buPROPion (WELLBUTRIN XL) 300 MG 24 hr tablet    busPIRone HCl (BUSPAR) 30 MG tablet    cetirizine (ZYRTEC) 10 MG tablet    desogestrel-ethinyl estradiol (KARIVA) 0.15-0.02/0.01 MG (21/5) tablet    FLUoxetine (PROZAC) 40 MG capsule    ibuprofen (ADVIL,MOTRIN) 400 MG tablet    propranolol (INDERAL) 10 MG tablet     No current facility-administered medications for this visit.        PDMP Review       None             Vitals There were no vitals taken for this visit.        No data to display                Labs: reviewed in office  Lab on 11/15/2023   Component Date Value Ref Range Status    Vitamin D, Total (25-Hydroxy) 11/15/2023 34  20 - 50 ng/mL Final    optimum levels    Vitamin B12 11/15/2023 276  232 - 1,245 pg/mL Final    Sodium 11/15/2023 142  135 - 145 mmol/L Final    Reference intervals for this test were updated on 09/26/2023 to more accurately reflect our healthy population. There may be differences in the flagging of prior results with similar values performed with this method. Interpretation of those prior results can be made in the context of the updated reference intervals.     Potassium 11/15/2023 4.4  3.4 - 5.3 mmol/L Final    Carbon Dioxide (CO2) 11/15/2023 25  22 - 29 mmol/L Final    Anion Gap 11/15/2023 10  7 - 15 mmol/L Final    Urea Nitrogen 11/15/2023 10.7  6.0 - 20.0 mg/dL Final    Creatinine 11/15/2023 0.91  0.51 - 0.95 mg/dL Final    GFR Estimate 11/15/2023 81  >60 mL/min/1.73m2 Final    Calcium 11/15/2023 9.4  8.6 - 10.0 mg/dL Final    Chloride 11/15/2023 107  98 - 107 mmol/L Final     Glucose 11/15/2023 102 (H)  70 - 99 mg/dL Final    Alkaline Phosphatase 11/15/2023 69  40 - 150 U/L Final    Reference intervals for this test were updated on 11/14/2023 to more accurately reflect our healthy population. There may be differences in the flagging of prior results with similar values performed with this method. Interpretation of those prior results can be made in the context of the updated reference intervals.    AST 11/15/2023 27  0 - 45 U/L Final    Reference intervals for this test were updated on 6/12/2023 to more accurately reflect our healthy population. There may be differences in the flagging of prior results with similar values performed with this method. Interpretation of those prior results can be made in the context of the updated reference intervals.    ALT 11/15/2023 27  0 - 50 U/L Final    Reference intervals for this test were updated on 6/12/2023 to more accurately reflect our healthy population. There may be differences in the flagging of prior results with similar values performed with this method. Interpretation of those prior results can be made in the context of the updated reference intervals.      Protein Total 11/15/2023 6.9  6.4 - 8.3 g/dL Final    Albumin 11/15/2023 4.2  3.5 - 5.2 g/dL Final    Bilirubin Total 11/15/2023 <0.2  <=1.2 mg/dL Final    Folic Acid 11/15/2023 17.0  4.6 - 34.8 ng/mL Final    TSH 11/15/2023 3.22  0.30 - 4.20 uIU/mL Final    Free T4 11/15/2023 0.88 (L)  0.90 - 1.70 ng/dL Final    Iron 11/15/2023 85  37 - 145 ug/dL Final    Iron Binding Capacity 11/15/2023 301  240 - 430 ug/dL Final    Iron Sat Index 11/15/2023 28  15 - 46 % Final       EKG: Most recent EKG from 9/11/2019 reviewed. QTc interval 401.  Sinus  Rhythm   -RSR(V1) -nondiagnostic.     MSE  Appearance: Awake, alert, adequately groomed, appeared stated age  Behavior: cooperative and pleasant  Eye Contact:  intact  Gait and motor coordination: normal   Psychomotor Behavior:  no evidence of  "tardive dyskinesia, dystonia, or tics  Attention and Concentration: Good  Speech: Clear, coherent, regular rate, rhythm and volume  Mood:  \"okay\"  Affect:  blunted  Associations:  no loose associations  Orientation: oriented to time, place and person  Thought process:  logical, linear, and goal-directed  Thought content: no evidence of suicidal ideation or homicidal ideation  Does not appear to be responding to internal stimuli.    Memory: Grossly intact as assessed by interview. Not formally assessed  Fund of knowledge: Average  Insight: Good  Judgement: Good  HISTORY   Psychotropic medications at evaluation 10/9/2023   Wellbutrin XL 150mg a day   Buspirone 30mg TWICE DAILY   Prozac 40mg a day   Propranolol 10mg QID AS NEEDED anxiety   Past Psychotropic Medication Trials  Citalopram (Celexa) stopped 1/2023 up to 30mg a day     Allergies   Allergen Reactions    Seasonal Allergies       Active Ambulatory Problems     Diagnosis Date Noted    Current moderate episode of major depressive disorder without prior episode (H) 09/11/2019    WILFREDO (generalized anxiety disorder) 09/11/2019     Resolved Ambulatory Problems     Diagnosis Date Noted    NO ACTIVE PROBLEMS 11/30/2012    Labor and delivery indication for care or intervention 09/06/2013    Active labor 09/06/2013    Normal delivery 09/06/2013    Indication for care in labor or delivery 05/18/2016    Liveborn infant 05/18/2016    Labor, precipitous, delivered 05/18/2016    Back pain 06/19/2018     Past Medical History:   Diagnosis Date    Depressive disorder     History of asthma     Uncomplicated asthma 1992      DIAGNOSIS   DSM   WILFREDO (generalized anxiety disorder)  Chronic fatigue     Medical Comorbidities: See above    ASSESSMENT   Formulation/MDM  For a more comprehensive formulation, refer to initial CCPS assessment. No benefit with incrased Bupropion XL, more side effects. Genetic testing follow up. Lab result follow up with PCP, consider integrative medicine. " Start cross taper from fluoxetine.     Safety:  Low risk for harm towards self or others. Safety plan reviewed as indicated. Local community safety resources provided to patient to use if needed and reviewed for patient to use if needed.    Psychoeducation:   Medication side effects, treatment recommendations, risks/benefits and alternatives reviewed. Reviewed recommended treatment, risks, benefits, and alternatives, coordination of care with other clinicians, and medication education.   All questions addressed  PLAN   Follow up: 6 weeks or 2 months  STATUS: Patient Status: Patient will continue to be seen for ongoing consultation and stabilization.   Medication Changes  DECREASE Bupropion XL to 300mg a day   Continue propranolol 10mg QID as needed anxiety, okay to take in evening, okay to try 2 tabs  START B complex with L methylfolate  Consider starting multivitamin   START Vitamin D3 1231-1824 international units once a day   START Cross taper  Week 1:   Fluoxetine 10mg cap take 2 caps once a day   Start viibryd 10mg once a day   Week 2  Fluoxetine 10mg once a day   Vilazodone (Viibryd) 20mg once a day   Week 3:   STOP Fluoxetine  Continue viibryd 20mg a day     Labs/Orders: Follow up with PCP on thyroid results  Referrals: Therapy Referral submitted ([facility]). Call [facility] at [phone] if you do not hear from them soon  Continue all other treatments (including medications) per primary care provider and/or specialists, follow up with primary care provider as planned or for acute medical concerns.    GOKUL Clay, CNP, PMHNP  Collaborative Care Psychiatry Service (CCPS)  [facility]  ADMINISTRATIVE BILLING   Video/Phone Start Time: 1001   Video/Phone End Time: 1015   Level of Medical Decision Making:   - At least 1 chronic problem that is not stable  - Engaged in prescription drug management during visit (discussed any medication benefits, side effects, alternatives,  etc.)  {Complexity / amount of data reviewed / analyzed (Optional):754236     EPISODE [x]   Disclaimer: This note consists of symbols derived from keyboarding, dictation and/or voice recognition software. As a result, there may be errors in the script that have gone undetected. Please consider this when interpreting information found in this chart.

## 2023-11-24 ENCOUNTER — VIRTUAL VISIT (OUTPATIENT)
Dept: PEDIATRICS | Facility: CLINIC | Age: 43
End: 2023-11-24
Payer: COMMERCIAL

## 2023-11-24 DIAGNOSIS — R94.6 ABNORMAL THYROID FUNCTION TEST: Primary | ICD-10-CM

## 2023-11-24 PROCEDURE — 99213 OFFICE O/P EST LOW 20 MIN: CPT | Mod: VID | Performed by: INTERNAL MEDICINE

## 2023-11-24 NOTE — PROGRESS NOTES
Melinda is a 42 year old who is being evaluated via a billable video visit.        Assessment & Plan     Abnormal thyroid function test  Pt had low T4 with normal TSH - will recheck to confirm.  I have verbal consent to e-consult endocrinology if needed.  - TSH; Future  - T4, free; Future         See Patient Instructions    Maryuri Jonas MD  [facility] [place]    Subjective   Melinda is a 42 year old, presenting for the following health issues:  No chief complaint on file.      HPI     Ongoing symptoms of low thyroid that overlap with symptoms of depression/anxiety.  The results are unusual.      Review of Systems   All other systems on a 10-point review are negative, unless otherwise noted in HPI        Objective           Vitals:  No vitals were obtained today due to virtual visit.    Physical Exam   GENERAL: Healthy, alert and no distress  EYES: Eyes grossly normal to inspection.  No discharge or erythema, or obvious scleral/conjunctival abnormalities.  RESP: No audible wheeze, cough, or visible cyanosis.  No visible retractions or increased work of breathing.    SKIN: Visible skin clear. No significant rash, abnormal pigmentation or lesions.  NEURO: Cranial nerves grossly intact.  Mentation and speech appropriate for age.  PSYCH: Mentation appears normal, affect normal/bright, judgement and insight intact, normal speech and appearance well-groomed.    Lab on 11/15/2023   Component Date Value Ref Range Status    Vitamin D, Total (25-Hydroxy) 11/15/2023 34  20 - 50 ng/mL Final    optimum levels    Vitamin B12 11/15/2023 276  232 - 1,245 pg/mL Final    Sodium 11/15/2023 142  135 - 145 mmol/L Final    Reference intervals for this test were updated on 09/26/2023 to more accurately reflect our healthy population. There may be differences in the flagging of prior results with similar values performed with this method. Interpretation of those prior results can be made in the context of the updated  reference intervals.     Potassium 11/15/2023 4.4  3.4 - 5.3 mmol/L Final    Carbon Dioxide (CO2) 11/15/2023 25  22 - 29 mmol/L Final    Anion Gap 11/15/2023 10  7 - 15 mmol/L Final    Urea Nitrogen 11/15/2023 10.7  6.0 - 20.0 mg/dL Final    Creatinine 11/15/2023 0.91  0.51 - 0.95 mg/dL Final    GFR Estimate 11/15/2023 81  >60 mL/min/1.73m2 Final    Calcium 11/15/2023 9.4  8.6 - 10.0 mg/dL Final    Chloride 11/15/2023 107  98 - 107 mmol/L Final    Glucose 11/15/2023 102 (H)  70 - 99 mg/dL Final    Alkaline Phosphatase 11/15/2023 69  40 - 150 U/L Final    Reference intervals for this test were updated on 11/14/2023 to more accurately reflect our healthy population. There may be differences in the flagging of prior results with similar values performed with this method. Interpretation of those prior results can be made in the context of the updated reference intervals.    AST 11/15/2023 27  0 - 45 U/L Final    Reference intervals for this test were updated on 6/12/2023 to more accurately reflect our healthy population. There may be differences in the flagging of prior results with similar values performed with this method. Interpretation of those prior results can be made in the context of the updated reference intervals.    ALT 11/15/2023 27  0 - 50 U/L Final    Reference intervals for this test were updated on 6/12/2023 to more accurately reflect our healthy population. There may be differences in the flagging of prior results with similar values performed with this method. Interpretation of those prior results can be made in the context of the updated reference intervals.      Protein Total 11/15/2023 6.9  6.4 - 8.3 g/dL Final    Albumin 11/15/2023 4.2  3.5 - 5.2 g/dL Final    Bilirubin Total 11/15/2023 <0.2  <=1.2 mg/dL Final    Folic Acid 11/15/2023 17.0  4.6 - 34.8 ng/mL Final    TSH 11/15/2023 3.22  0.30 - 4.20 uIU/mL Final    Free T4 11/15/2023 0.88 (L)  0.90 - 1.70 ng/dL Final    Iron 11/15/2023 85  37 - 145  ug/dL Final    Iron Binding Capacity 11/15/2023 301  240 - 430 ug/dL Final    Iron Sat Index 11/15/2023 28  15 - 46 % Final               Video-Visit Details    Type of service:  Video Visit     Originating Location (pt. Location): Home    Distant Location (provider location):  Off-site  Platform used for Video Visit: Kristine

## 2023-11-29 ENCOUNTER — LAB (OUTPATIENT)
Dept: LAB | Facility: CLINIC | Age: 43
End: 2023-11-29
Payer: COMMERCIAL

## 2023-11-29 DIAGNOSIS — R94.6 ABNORMAL THYROID FUNCTION TEST: ICD-10-CM

## 2023-11-29 LAB
T4 FREE SERPL-MCNC: 0.99 NG/DL (ref 0.9–1.7)
TSH SERPL DL<=0.005 MIU/L-ACNC: 2.14 UIU/ML (ref 0.3–4.2)

## 2023-11-29 PROCEDURE — 84443 ASSAY THYROID STIM HORMONE: CPT

## 2023-11-29 PROCEDURE — 84439 ASSAY OF FREE THYROXINE: CPT

## 2023-11-29 PROCEDURE — 36415 COLL VENOUS BLD VENIPUNCTURE: CPT

## 2023-11-30 NOTE — RESULT ENCOUNTER NOTE
Dear Melinda,    Your repeat thyroid tests came back normal.  I suspect the first test was a false positive - I recommend rechecking this after 2-3 more months to ensure stability.  I will place the order.    Please feel free to call with any questions.  Otherwise, we can discuss further at your next appointment.    Sincerely,    Maryuri Jonas MD

## 2023-12-09 ENCOUNTER — HEALTH MAINTENANCE LETTER (OUTPATIENT)
Age: 43
End: 2023-12-09

## 2023-12-11 ASSESSMENT — ENCOUNTER SYMPTOMS
JOINT SWELLING: 0
ARTHRALGIAS: 0
PALPITATIONS: 0
HEADACHES: 0
NAUSEA: 0
NERVOUS/ANXIOUS: 0
SORE THROAT: 1
BREAST MASS: 0
MYALGIAS: 0
FREQUENCY: 0
DYSURIA: 0
DIARRHEA: 0
FEVER: 0
ABDOMINAL PAIN: 0
SHORTNESS OF BREATH: 0
EYE PAIN: 0
PARESTHESIAS: 0
HEARTBURN: 0
DIZZINESS: 0
COUGH: 0
HEMATOCHEZIA: 0
WEAKNESS: 0
CONSTIPATION: 0
HEMATURIA: 0
CHILLS: 0

## 2023-12-12 ENCOUNTER — OFFICE VISIT (OUTPATIENT)
Dept: PEDIATRICS | Facility: CLINIC | Age: 43
End: 2023-12-12
Attending: INTERNAL MEDICINE
Payer: COMMERCIAL

## 2023-12-12 VITALS
HEIGHT: 65 IN | BODY MASS INDEX: 25.74 KG/M2 | RESPIRATION RATE: 16 BRPM | HEART RATE: 94 BPM | OXYGEN SATURATION: 99 % | TEMPERATURE: 98.3 F | DIASTOLIC BLOOD PRESSURE: 76 MMHG | WEIGHT: 154.5 LBS | SYSTOLIC BLOOD PRESSURE: 118 MMHG

## 2023-12-12 DIAGNOSIS — Z30.41 ENCOUNTER FOR SURVEILLANCE OF CONTRACEPTIVE PILLS: ICD-10-CM

## 2023-12-12 DIAGNOSIS — Z13.220 LIPID SCREENING: ICD-10-CM

## 2023-12-12 DIAGNOSIS — F32.1 CURRENT MODERATE EPISODE OF MAJOR DEPRESSIVE DISORDER WITHOUT PRIOR EPISODE (H): ICD-10-CM

## 2023-12-12 DIAGNOSIS — F41.1 GAD (GENERALIZED ANXIETY DISORDER): ICD-10-CM

## 2023-12-12 DIAGNOSIS — Z00.00 ROUTINE GENERAL MEDICAL EXAMINATION AT A HEALTH CARE FACILITY: Primary | ICD-10-CM

## 2023-12-12 DIAGNOSIS — Z12.31 VISIT FOR SCREENING MAMMOGRAM: ICD-10-CM

## 2023-12-12 PROCEDURE — 99396 PREV VISIT EST AGE 40-64: CPT | Performed by: INTERNAL MEDICINE

## 2023-12-12 RX ORDER — DESOGESTREL AND ETHINYL ESTRADIOL 21-5 (28)
1 KIT ORAL DAILY
Qty: 84 TABLET | Refills: 3 | Status: SHIPPED | OUTPATIENT
Start: 2023-12-12

## 2023-12-12 RX ORDER — AMOXICILLIN 500 MG/1
CAPSULE ORAL
COMMUNITY
Start: 2023-12-09 | End: 2024-01-02

## 2023-12-12 ASSESSMENT — ENCOUNTER SYMPTOMS
HEARTBURN: 0
FREQUENCY: 0
NAUSEA: 0
DYSURIA: 0
FEVER: 0
SHORTNESS OF BREATH: 0
MYALGIAS: 0
WEAKNESS: 0
HEMATOCHEZIA: 0
NERVOUS/ANXIOUS: 0
PALPITATIONS: 0
DIZZINESS: 0
DIARRHEA: 0
ABDOMINAL PAIN: 0
BREAST MASS: 0
SORE THROAT: 1
COUGH: 0
JOINT SWELLING: 0
HEMATURIA: 0
EYE PAIN: 0
CHILLS: 0
HEADACHES: 0
ARTHRALGIAS: 0
PARESTHESIAS: 0
CONSTIPATION: 0

## 2023-12-12 ASSESSMENT — PAIN SCALES - GENERAL: PAINLEVEL: NO PAIN (0)

## 2023-12-12 ASSESSMENT — PATIENT HEALTH QUESTIONNAIRE - PHQ9: SUM OF ALL RESPONSES TO PHQ QUESTIONS 1-9: 6

## 2023-12-12 NOTE — PROGRESS NOTES
SUBJECTIVE:   Melinda is a 43 year old, presenting for the following:  Physical        12/12/2023     1:13 PM   Additional Questions   Roomed by MONISHA Goldberg   Accompanied by n/a         12/12/2023     1:13 PM   Patient Reported Additional Medications   Patient reports taking the following new medications n/a     CONCERNS: None    Healthy Habits:     Getting at least 3 servings of Calcium per day:  Yes    Bi-annual eye exam:  Yes    Dental care twice a year:  Yes    Sleep apnea or symptoms of sleep apnea:  Daytime drowsiness    Diet:  Regular (no restrictions)    Frequency of exercise:  2-3 days/week    Duration of exercise:  30-45 minutes    Taking medications regularly:  Yes    Medication side effects:  None    Additional concerns today:  No              Social History     Tobacco Use    Smoking status: Never    Smokeless tobacco: Never   Substance Use Topics    Alcohol use: Yes     Comment: 1 drink weekly              12/11/2023     8:32 AM   Alcohol Use   Prescreen: >3 drinks/day or >7 drinks/week? No     Reviewed orders with patient.  Reviewed health maintenance and updated orders accordingly -       Breast Cancer Screening:    FHS-7:       10/25/2022     7:50 AM 12/11/2023     8:34 AM   Breast CA Risk Assessment (FHS-7)   Did any of your first-degree relatives have breast or ovarian cancer? No No   Did any of your relatives have bilateral breast cancer? No No   Did any man in your family have breast cancer? No No   Did any woman in your family have breast and ovarian cancer? No No   Did any woman in your family have breast cancer before age 50 y? No No   Do you have 2 or more relatives with breast and/or ovarian cancer? Yes No   Do you have 2 or more relatives with breast and/or bowel cancer? No No         Pertinent mammograms are reviewed under the imaging tab.    History of abnormal Pap smear: NO - age 30-65 PAP every 5 years with negative HPV co-testing recommended      Latest Ref Rng & Units  "10/24/2019    10:18 AM 10/24/2019     9:49 AM   PAP / HPV   PAP (Historical)   NIL    HPV 16 DNA NEG^Negative Negative     HPV 18 DNA NEG^Negative Negative     Other HR HPV NEG^Negative Negative       Reviewed and updated as needed this visit by clinical staff   Tobacco  Allergies  Meds              Reviewed and updated as needed this visit by Provider                     Review of Systems   Constitutional:  Negative for chills and fever.   HENT:  Positive for sore throat. Negative for congestion, ear pain and hearing loss.    Eyes:  Negative for pain and visual disturbance.   Respiratory:  Negative for cough and shortness of breath.    Cardiovascular:  Negative for chest pain, palpitations and peripheral edema.   Gastrointestinal:  Negative for abdominal pain, constipation, diarrhea, heartburn, hematochezia and nausea.   Breasts:  Negative for tenderness, breast mass and discharge.   Genitourinary:  Negative for dysuria, frequency, genital sores, hematuria, pelvic pain, urgency, vaginal bleeding and vaginal discharge.   Musculoskeletal:  Negative for arthralgias, joint swelling and myalgias.   Skin:  Negative for rash.   Neurological:  Negative for dizziness, weakness, headaches and paresthesias.   Psychiatric/Behavioral:  Negative for mood changes. The patient is not nervous/anxious.           OBJECTIVE:   /76 (BP Location: Right arm, Patient Position: Sitting, Cuff Size: Adult Regular)   Pulse 94   Temp 98.3  F (36.8  C) (Oral)   Resp 16   Ht 1.657 m (5' 5.24\")   Wt 70.1 kg (154 lb 8 oz)   LMP 12/02/2023   SpO2 99%   Breastfeeding No   BMI 25.52 kg/m    Physical Exam  GENERAL: healthy, alert and no distress  EYES: Eyes grossly normal to inspection, PERRL and conjunctivae and sclerae normal  HENT: ear canals and TM's normal, nose and mouth without ulcers or lesions  NECK: no adenopathy, no asymmetry, masses, or scars and thyroid normal to palpation  RESP: lungs clear to auscultation - no rales, " rhonchi or wheezes  CV: regular rate and rhythm, normal S1 S2, no S3 or S4, no murmur, click or rub, no peripheral edema and peripheral pulses strong  ABDOMEN: soft, nontender, no hepatosplenomegaly, no masses and bowel sounds normal  MS: no gross musculoskeletal defects noted, no edema  SKIN: no suspicious lesions or rashes  NEURO: Normal strength and tone, mentation intact and speech normal  PSYCH: mentation appears normal, affect normal/bright    Diagnostic Test Results:  Labs reviewed in Epic    ASSESSMENT/PLAN:       ICD-10-CM    1. Routine general medical examination at a health care facility  Z00.00 Lipid panel reflex to direct LDL Fasting      2. Current moderate episode of major depressive disorder without prior episode (H)  F32.1     Seeing psychiatry and is currently doing better - now on bupropion, viibrid, and propranolol prn      3. WILFREDO (generalized anxiety disorder)  F41.1     see above - per psych      4. Encounter for surveillance of contraceptive pills  Z30.41 desogestrel-ethinyl estradiol (KARIVA) 0.15-0.02/0.01 MG (21/5) tablet      5. Visit for screening mammogram  Z12.31 MA Screen Bilateral w/Mitchell      6. Lipid screening  Z13.220 Lipid panel reflex to direct LDL Fasting     CANCELED: Lipid panel reflex to direct LDL Fasting                COUNSELING:  Reviewed preventive health counseling, as reflected in patient instructions        She reports that she has never smoked. She has never used smokeless tobacco.          Maryuri Jonas MD  [facility]

## 2023-12-26 ENCOUNTER — MYC REFILL (OUTPATIENT)
Dept: PSYCHIATRY | Facility: CLINIC | Age: 43
End: 2023-12-26
Payer: COMMERCIAL

## 2023-12-26 DIAGNOSIS — F41.1 GAD (GENERALIZED ANXIETY DISORDER): ICD-10-CM

## 2023-12-26 RX ORDER — VILAZODONE HYDROCHLORIDE 20 MG/1
20 TABLET ORAL DAILY
Qty: 30 TABLET | Refills: 0 | Status: SHIPPED | OUTPATIENT
Start: 2023-12-26 | End: 2024-01-02

## 2023-12-26 NOTE — TELEPHONE ENCOUNTER
Date of Last Office Visit: 11/2023  Date of Next Office Visit: 1/2/2024  No shows since last visit: none  Cancellations since last visit: none    Medication requested: vilazodone (VIIBRYD) 20 MG TABS tablet  Date last ordered: 11/20/23 Qty: 30 Refills: 0     Review of [place] ?: No, this medication [place] not [place] on the [place]-    Lapse in medication adherence greater than 5 days?: NO, according to patient MyC message she has 4 tablets left  If yes, call patient and gather details: n/a  Medication refill request verified as identical to current order?: Yes  Result of Last DAM, VPA, Li+ Level, CBC, or Carbamazepine Level (at or since last visit):  T4 Free and TSH on 11/29/2023 with PCP     Last visit treatment plan:     ASSESSMENT   Formulation/MDM  For a more comprehensive formulation, refer to initial CCPS assessment. No benefit with incrased Bupropion XL, more side effects. Genetic testing follow up. Lab result follow up with PCP, consider integrative medicine. Start cross taper from fluoxetine.      Safety:  Low risk for harm towards self or others. Safety plan reviewed as indicated. Local community safety resources provided to patient to use if needed and reviewed for patient to use if needed.     Psychoeducation:   Medication side effects, treatment recommendations, risks/benefits and alternatives reviewed. Reviewed recommended treatment, risks, benefits, and alternatives, coordination of care with other clinicians, and medication education.   All questions addressed  PLAN   Follow up: 6 weeks or 2 months  STATUS: Patient Status: Patient will continue to be seen for ongoing consultation and stabilization.   Medication Changes  DECREASE Bupropion XL to 300mg a day   Continue propranolol 10mg QID as needed anxiety, okay to take in evening, okay to try 2 tabs  START B complex with L methylfolate  Consider starting multivitamin   START Vitamin D3 1974-6573 international units once a day   START Cross taper  Week 1:    Fluoxetine 10mg cap take 2 caps once a day   Start viibryd 10mg once a day   Week 2  Fluoxetine 10mg once a day   Vilazodone (Viibryd) 20mg once a day   Week 3:   STOP Fluoxetine  Continue viibryd 20mg a day      Labs/Orders: Follow up with PCP on thyroid results  Referrals: Therapy Referral submitted ([facility]). Call [facility] at [phone] if you do not hear from them soon  Continue all other treatments (including medications) per primary care provider and/or specialists, follow up with primary care provider as planned or for acute medical concerns.     Stefania White APRN, CNP, PMHNP  Collaborative Care Psychiatry Service ([facility]PS)  [facility]    [x]Medication refilled per  Medication Refill in Ambulatory Care  policy.  []Medication unable to be refilled by RN due to criteria not met as indicated below:    []Eligibility - not seen in the last year   []Supervision - no future appointment   []Compliance - no shows, cancellations or lapse in therapy   []Verification - order discrepancy   []Controlled medication   []Medication not included in policy   []90-day supply request   []Other    Sandra Frank, RN on 12/26/2023 at 11:47 AM

## 2024-01-02 ENCOUNTER — VIRTUAL VISIT (OUTPATIENT)
Dept: PSYCHIATRY | Facility: CLINIC | Age: 44
End: 2024-01-02
Payer: COMMERCIAL

## 2024-01-02 DIAGNOSIS — F41.1 GAD (GENERALIZED ANXIETY DISORDER): Primary | ICD-10-CM

## 2024-01-02 DIAGNOSIS — F32.1 CURRENT MODERATE EPISODE OF MAJOR DEPRESSIVE DISORDER WITHOUT PRIOR EPISODE (H): ICD-10-CM

## 2024-01-02 PROCEDURE — 99214 OFFICE O/P EST MOD 30 MIN: CPT | Mod: 95 | Performed by: NURSE PRACTITIONER

## 2024-01-02 RX ORDER — VILAZODONE HYDROCHLORIDE 20 MG/1
20 TABLET ORAL DAILY
Qty: 90 TABLET | Refills: 0 | Status: SHIPPED | OUTPATIENT
Start: 2024-01-02 | End: 2024-04-02

## 2024-01-02 NOTE — PATIENT INSTRUCTIONS
Treatment plan today   Follow up in 3 months (or sooner as needed)  Medication changes   Wellbutrin XL 300mg a day   propranolol 10mg QID as needed anxiety, okay to take in evening, okay to try 2 tabs  Multivitamin   Start L methylfolate once a day   Vilazodone (Viibryd) 20mg a day    Vitamin D3 6607-9902 international units once a day   Communication  Call the psychiatric nurse line with medication questions or concerns at [phone] or [phone].  FamilyLeafhart may be used to communicate with your care team, but this is not intended to be used for emergencies.  You can call the above number to make appointments, leave a message with our nursing team, and inquire about any mental health referrals I have placed.  Please call your pharmacy to request a refill of your medications listed above if needed between appointments.   Safety Plan - see below for crisis resources   Call or text [phone] for mental health crisis.   Call 911 or use ER for potentially life-threatening situations.     GOKUL Clay, CNP, PMHNP  Collaborative Care Psychiatry Service (CCPS)    [facility]         RESOURCES     Crisis Resources  For emergency help, please call 911 or go to the nearest Emergency Department.     Emergency Walk-In Options:   EmPATH Unit @ [facility] ([place]): [phone] - Specialized mental health emergency area designed to be calming  [facility] ([place]): [phone]  [facility] ([place]): [phone]  [facility]): [phone]    County Crisis Information:   [place]: [phone]  [place]: [phone]  [place] (COPE) - Adult: [phone]     Child: [phone]  [place] - Adult: [phone]     Child: [phone]  [place]: [phone]  List of all [facility] resources:   https://[place].gov/dhs/people-we-serve/adults/health-care/mental-health/resources/crisis-contacts.jsp    National Crisis Information:   National Suicide & Crisis  Lifeline: Call 988   For online chat options, visit https://suicidepreventionlifeline.org/chat/  Poison Control Center: 8-073-377-8424  Poison Control Center: 7-660-743-4873  Trans Lifeline: 9-980-383-8727 - Hotline for transgender people of all ages  The [employer] Project: 7-388-891-0197 - Hotline for LGBT youth     For Non-Emergency Support:   Fast Tracker: Mental Health & Substance Use Disorder Resources -   https://www.NetClarityn.org/    Additional Resources  Financial Assistance [phone]  [facility] Billing [phone]  [place] Billing Office, ealth: [phone]  [place] Billing [phone]  Medical Records [phone]  [place] Patient Bill of Rights [email or web address]         Patient Education   Collaborative Care Psychiatry Service  What to Expect  Here's what to expect from your Collaborative Care Psychiatry Service (CCPS).   About CCPS  CCPS means 2 people work together to help you get better. You'll meet with a behavioral health clinician and a psychiatric doctor. A behavioral health clinician helps people with mental health problems by talking with them. A psychiatric doctor helps people by giving them medicine.  How it works  At every visit, you'll see the behavioral health clinician (BHC) first. They'll talk with you about how you're doing and teach you how to feel better.   Then you'll see the psychiatric doctor. This doctor can help you deal with troubling thoughts and feelings by giving you medicine. They'll make sure you know the plan for your care.   CCPS usually takes 3 to 6 visits. If you need more visits, we may have you start seeing a different psychiatric doctor for ongoing care.  If you have any questions or concerns, we'll be glad to talk with you.  About visits  Be open  At your visits, please talk openly about your problems. It may feel hard, but it's the best way for us to help you.  Cancelling visits  If you  "can't come to your visit, please call us right away at [phone]. If you don't cancel at least 24 hours (1 full day) before your visit, that's \"late cancellation.\"  Being late to visits  Being very late is the same as not showing up. You will be a \"no show\" if:  Your appointment starts with a [facility] Emergency Department, and you're more than 15 minutes late for a 30-minute (half hour) visit. This will also cancel your appointment with the psychiatric doctor.  Your appointment is with a psychiatric doctor only, and you're more than 15 minutes late for a 30-minute (half hour) visit.  Your appointment is with a psychiatric doctor only, and you're more than 30 minutes late for a 60-minute (full hour) visit.  If you cancel late or don't show up 2 times within 6 months, we may end your care.   Getting help between visits  If you need help between visits, you can call us Monday to Friday from 8 a.m. to 4:30 p.m. at [phone].  Emergency care  Call 911 or go to the nearest emergency department if your life or someone else's life is in danger.  Call 988 anytime to reach the national Suicide and Crisis hotline.  Medicine refills  To refill your medicine, call your pharmacy. You can also call [facility]'s Behavioral Access at [phone], Monday to Friday, 8 a.m. to 4:30 p.m. It can take 1 to 3 business days to get a refill.   Forms, letters, and tests  You may have papers to fill out, like FMLA, short-term disability, and workability. We can help you with these forms at your visits, but you must have an appointment. You may need more than 1 visit for this, to be in an intensive therapy program, or both.  Before we can give you medicine for ADHD, we may refer you to get tested for it or confirm it another way.  We may not be able to give you an emotional support animal letter.  We don't do mental health checks ordered by the court.   We don't do mental health testing, but we can refer you to get tested.   Thank you for choosing us " for your care.  For informational purposes only. Not to replace the advice of your health care provider. Copyright   2022 [facility]. All rights reserved. VOSS 467844 - 12/22.

## 2024-01-02 NOTE — PROGRESS NOTES
Virtual Visit Details    Type of service:  Video Visit     Originating Location (pt. Location): Home    Distant Location (provider location):  Off-site  Platform used for Video Visit: [facility] [place] [address], [place], [place], [id number] [place] [address]  [phone]     Mode of Visit: Telemedicine Visit: The patient's condition can be safely assessed and treated via synchronous audio and visual telemedicine encounter.      Collaborative Care Psychiatry Service (CCPS)  Outpatient Psychiatric Progress note    IDENTIFYING DATA   Name:  Yvette Garcia   Preferred name: Melinda    : 1980/  year old      Melinda currently lives in [place] [address] with Spouse/Partner and Children. 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     PCP: Maryuri Jonas MD   Psychotherapist: None currently  INTERIM HISTORY   CC: CCPS follow-up appointment  The patient was last seen on 2023 for Return Visit, at last visit decreased Bupropion XL back to 300mg a day, started cross taper from fluoxetine to viibryd. Supplements recommended to start  SUBJECTIVE    She reports she is doing really well, she thinks the new medicine is helping a lot.   Energy level has been a little big better.   Not as tired as much.   Repeat labs were completed - and T4 is stable, will repeat again in another month  Anxiety has been about the same, mostly manageable, social anxiety overall.   Sleep: Sleeping well through the night, hours of sleep about 8ish hours of sleep.   Depression has been better overall.   Less craving to get back into bed.   Feels like she can accomplish things in the day   Rarely using propranolol, isn't sure if it helps  Caffeine - 1-2 cups a day  Planning to take a break from alcohol for awhile, taking a break  Appetite: normal, the same.   PHQ9       12/12/2023     1:15 PM 10/8/2023     5:40 PM 9/15/2023     8:59 AM   PHQ   PHQ-9 Total Score 6 10 9   Q9: Thoughts of better off dead/self-harm past 2 weeks Not at all Not at all Not at all     GAD7      9/15/2023     8:59 AM 8/17/2023     2:02 PM 1/6/2023     8:05 AM   WILFREDO-7 SCORE   Total Score 2 (minimal anxiety) 10 (moderate anxiety) 1 (minimal anxiety)   Total Score 2 10 1     OBJECTIVE     Current Outpatient Medications   Medication    acetaminophen (TYLENOL) 325 MG tablet    amoxicillin (AMOXIL) 500 MG capsule    buPROPion (WELLBUTRIN XL) 300 MG 24 hr tablet    cetirizine (ZYRTEC) 10 MG tablet    desogestrel-ethinyl estradiol (KARIVA) 0.15-0.02/0.01 MG (21/5) tablet    ibuprofen (ADVIL,MOTRIN) 400 MG tablet    propranolol (INDERAL) 10 MG tablet    vilazodone (VIIBRYD) 20 MG TABS tablet     No current facility-administered medications for this visit.        PDMP Review       None             Vitals LMP 12/02/2023         No data to display                Labs:    Latest Reference Range & Units 11/29/23 11:42   T4 Free 0.90 - 1.70 ng/dL 0.99   TSH 0.30 - 4.20 uIU/mL 2.14     Lab on 11/15/2023   Component Date Value Ref Range Status    Vitamin D, Total (25-Hydroxy) 11/15/2023 34  20 - 50 ng/mL Final    optimum levels    Vitamin B12 11/15/2023 276  232 - 1,245 pg/mL Final    Sodium 11/15/2023 142  135 - 145 mmol/L Final    Reference intervals for this test were updated on 09/26/2023 to more accurately reflect our healthy population. There may be differences in the flagging of prior results with similar values performed with this method. Interpretation of those prior results can be made in the context of the updated reference intervals.     Potassium 11/15/2023 4.4  3.4 - 5.3 mmol/L Final    Carbon Dioxide (CO2) 11/15/2023 25  22 - 29 mmol/L Final    Anion Gap 11/15/2023 10  7 - 15 mmol/L Final    Urea Nitrogen 11/15/2023 10.7  6.0 - 20.0 mg/dL Final    Creatinine 11/15/2023 0.91  0.51 - 0.95 mg/dL Final    GFR  Estimate 11/15/2023 81  >60 mL/min/1.73m2 Final    Calcium 11/15/2023 9.4  8.6 - 10.0 mg/dL Final    Chloride 11/15/2023 107  98 - 107 mmol/L Final    Glucose 11/15/2023 102 (H)  70 - 99 mg/dL Final    Alkaline Phosphatase 11/15/2023 69  40 - 150 U/L Final    Reference intervals for this test were updated on 11/14/2023 to more accurately reflect our healthy population. There may be differences in the flagging of prior results with similar values performed with this method. Interpretation of those prior results can be made in the context of the updated reference intervals.    AST 11/15/2023 27  0 - 45 U/L Final    Reference intervals for this test were updated on 6/12/2023 to more accurately reflect our healthy population. There may be differences in the flagging of prior results with similar values performed with this method. Interpretation of those prior results can be made in the context of the updated reference intervals.    ALT 11/15/2023 27  0 - 50 U/L Final    Reference intervals for this test were updated on 6/12/2023 to more accurately reflect our healthy population. There may be differences in the flagging of prior results with similar values performed with this method. Interpretation of those prior results can be made in the context of the updated reference intervals.      Protein Total 11/15/2023 6.9  6.4 - 8.3 g/dL Final    Albumin 11/15/2023 4.2  3.5 - 5.2 g/dL Final    Bilirubin Total 11/15/2023 <0.2  <=1.2 mg/dL Final    Folic Acid 11/15/2023 17.0  4.6 - 34.8 ng/mL Final    TSH 11/15/2023 3.22  0.30 - 4.20 uIU/mL Final    Free T4 11/15/2023 0.88 (L)  0.90 - 1.70 ng/dL Final    Iron 11/15/2023 85  37 - 145 ug/dL Final    Iron Binding Capacity 11/15/2023 301  240 - 430 ug/dL Final    Iron Sat Index 11/15/2023 28  15 - 46 % Final       EKG: Most recent EKG from 9/11/2019 reviewed. QTc interval 401.  Sinus  Rhythm   -RSR(V1) -nondiagnostic.     MSE  Appearance: Awake, alert, adequately groomed, appeared  "stated age  Behavior: cooperative and pleasant  Eye Contact:  intact  Gait and motor coordination: normal   Psychomotor Behavior:  no evidence of tardive dyskinesia, dystonia, or tics  Attention and Concentration: Good  Speech: Clear, coherent, regular rate, rhythm and volume  Mood:  \"okay\"  Affect:  blunted  Associations:  no loose associations  Orientation: oriented to time, place and person  Thought process:  logical, linear, and goal-directed  Thought content: no evidence of suicidal ideation or homicidal ideation  Does not appear to be responding to internal stimuli.    Memory: Grossly intact as assessed by interview. Not formally assessed  Fund of knowledge: Average  Insight: Good  Judgement: Good  HISTORY   Psychotropic medications at evaluation 10/9/2023   Wellbutrin XL 150mg a day   Buspirone 30mg TWICE DAILY   Prozac 40mg a day   Propranolol 10mg QID AS NEEDED anxiety   Past Psychotropic Medication Trials  Citalopram (Celexa) stopped 1/2023 up to 30mg a day     Allergies   Allergen Reactions    Seasonal Allergies       Active Ambulatory Problems     Diagnosis Date Noted    Current moderate episode of major depressive disorder without prior episode (H) 09/11/2019    WILFREDO (generalized anxiety disorder) 09/11/2019     Resolved Ambulatory Problems     Diagnosis Date Noted    NO ACTIVE PROBLEMS 11/30/2012    Labor and delivery indication for care or intervention 09/06/2013    Active labor 09/06/2013    Normal delivery 09/06/2013    Indication for care in labor or delivery 05/18/2016    Liveborn infant 05/18/2016    Labor, precipitous, delivered 05/18/2016    Back pain 06/19/2018     Past Medical History:   Diagnosis Date    Depressive disorder     History of asthma     Uncomplicated asthma 1992      DIAGNOSIS   DSM   WILFREDO (generalized anxiety disorder)  Chronic fatigue     Medical Comorbidities: See above    ASSESSMENT   Formulation/MDM  For a more comprehensive formulation, refer to initial CCPS assessment. " Improving presentation with start of viibryd compared to fluoxetine. Continue with current regimen. Pt asked about genetic testing if she should consider, left option up to pt, no changes are recommended right now because of improvements. Recommended therapy again today, Considering returning pt to PCP at next appointment.     Safety:  Low risk for harm towards self or others. Safety plan reviewed as indicated. Local community safety resources provided to patient to use if needed and reviewed for patient to use if needed.    Psychoeducation:   Medication side effects, treatment recommendations, risks/benefits and alternatives reviewed. Reviewed recommended treatment, risks, benefits, and alternatives, coordination of care with other clinicians, and medication education.   All questions addressed  PLAN   Follow up: 3 months  STATUS: Patient Status: Patient will continue to be seen for ongoing consultation and stabilization.   Medication - no changes today  Wellbutrin XL 300mg a day   propranolol 10mg QID as needed anxiety, okay to take in evening, okay to try 2 tabs  Multivitamin   Start L methylfolate once a day   Vilazodone (Viibryd) 20mg a day    Vitamin D3 1697-5153 international units once a day   Labs/Orders: Follow up with PCP on thyroid results  Referrals: Therapy Referral submitted ([facility]). Call [facility] at [phone] if you do not hear from them soon  Continue all other treatments (including medications) per primary care provider and/or specialists, follow up with primary care provider as planned or for acute medical concerns.    GOKUL Clay, CNP, PMHNP  Collaborative Care Psychiatry Service (CCPS)  [facility]  ADMINISTRATIVE BILLING   Video/Phone Start Time: 1002  Video/Phone End Time: 1017  Level of Medical Decision Making:   - At least 1 chronic problem that is not stable  - Engaged in prescription drug management during visit (discussed any medication benefits, side  effects, alternatives, etc.)  {Complexity / amount of data reviewed / analyzed (Optional):787916     EPISODE [x]   Disclaimer: This note consists of symbols derived from keyboarding, dictation and/or voice recognition software. As a result, there may be errors in the script that have gone undetected. Please consider this when interpreting information found in this chart.

## 2024-01-02 NOTE — PROGRESS NOTES
"Virtual Visit Details    Type of service:  Video Visit     Originating Location (pt. Location): {video visit patient location:921240::\"Home\"}  {PROVIDER LOCATION On-site should be selected for visits conducted from your clinic location or adjoining [facility] hospital, academic office, or other nearby [facility] building. Off-site should be selected for all other provider locations, including home:[address]}  Distant Location (provider location):  {virtual location provider:270508}  Platform used for Video Visit: {Virtual Visit Platforms:455954::\"[employer]\"}  "

## 2024-01-02 NOTE — NURSING NOTE
Is the patient currently in the state of [place]? YES    Visit mode:VIDEO    If the visit is dropped, the patient can be reconnected by: VIDEO VISIT: Text to cell phone:   Telephone Information:   Mobile [phone]       Will anyone else be joining the visit? NO  (If patient encounters technical issues they should call [phone])    How would you like to obtain your AVS? MyChart    Are changes needed to the allergy or medication list? No    Reason for visit: RECHECK    Andria MACIAS

## 2024-01-08 ENCOUNTER — ANCILLARY PROCEDURE (OUTPATIENT)
Dept: MAMMOGRAPHY | Facility: CLINIC | Age: 44
End: 2024-01-08
Attending: INTERNAL MEDICINE
Payer: COMMERCIAL

## 2024-01-08 DIAGNOSIS — Z12.31 VISIT FOR SCREENING MAMMOGRAM: ICD-10-CM

## 2024-01-08 PROCEDURE — 77067 SCR MAMMO BI INCL CAD: CPT | Mod: TC | Performed by: RADIOLOGY

## 2024-02-26 ENCOUNTER — LAB (OUTPATIENT)
Dept: LAB | Facility: CLINIC | Age: 44
End: 2024-02-26
Payer: COMMERCIAL

## 2024-02-26 DIAGNOSIS — R94.6 ABNORMAL THYROID FUNCTION TEST: ICD-10-CM

## 2024-02-26 DIAGNOSIS — Z00.00 ROUTINE GENERAL MEDICAL EXAMINATION AT A HEALTH CARE FACILITY: ICD-10-CM

## 2024-02-26 LAB
CHOLEST SERPL-MCNC: 174 MG/DL
FASTING STATUS PATIENT QL REPORTED: YES
HDLC SERPL-MCNC: 51 MG/DL
LDLC SERPL CALC-MCNC: 94 MG/DL
NONHDLC SERPL-MCNC: 123 MG/DL
T4 FREE SERPL-MCNC: 0.96 NG/DL (ref 0.9–1.7)
TRIGL SERPL-MCNC: 147 MG/DL
TSH SERPL DL<=0.005 MIU/L-ACNC: 2.74 UIU/ML (ref 0.3–4.2)

## 2024-02-26 PROCEDURE — 36415 COLL VENOUS BLD VENIPUNCTURE: CPT

## 2024-02-26 PROCEDURE — 80061 LIPID PANEL: CPT

## 2024-02-26 PROCEDURE — 84439 ASSAY OF FREE THYROXINE: CPT

## 2024-02-26 PROCEDURE — 84443 ASSAY THYROID STIM HORMONE: CPT

## 2024-02-29 NOTE — RESULT ENCOUNTER NOTE
Dear Melinda,    Your lab results are normal.  At this time, I do not recommend any changes to your current plan of care.    Please feel free to call with any questions.  Otherwise, we can discuss further at your next appointment.    Sincerely,    Maryuri Jonas MD

## 2024-04-02 ENCOUNTER — VIRTUAL VISIT (OUTPATIENT)
Dept: PSYCHIATRY | Facility: CLINIC | Age: 44
End: 2024-04-02
Payer: COMMERCIAL

## 2024-04-02 DIAGNOSIS — F32.1 CURRENT MODERATE EPISODE OF MAJOR DEPRESSIVE DISORDER WITHOUT PRIOR EPISODE (H): ICD-10-CM

## 2024-04-02 DIAGNOSIS — F41.1 GAD (GENERALIZED ANXIETY DISORDER): ICD-10-CM

## 2024-04-02 PROCEDURE — 99213 OFFICE O/P EST LOW 20 MIN: CPT | Mod: 95 | Performed by: NURSE PRACTITIONER

## 2024-04-02 RX ORDER — VILAZODONE HYDROCHLORIDE 10 MG/1
10 TABLET ORAL DAILY
Qty: 30 TABLET | Refills: 2 | Status: SHIPPED | OUTPATIENT
Start: 2024-04-02 | End: 2024-06-04

## 2024-04-02 RX ORDER — VILAZODONE HYDROCHLORIDE 20 MG/1
20 TABLET ORAL DAILY
Qty: 90 TABLET | Refills: 0 | Status: SHIPPED | OUTPATIENT
Start: 2024-04-02 | End: 2024-06-04

## 2024-04-02 RX ORDER — BUPROPION HYDROCHLORIDE 300 MG/1
300 TABLET ORAL EVERY MORNING
Qty: 90 TABLET | Refills: 3 | Status: SHIPPED | OUTPATIENT
Start: 2024-04-02 | End: 2024-09-03

## 2024-04-02 ASSESSMENT — PATIENT HEALTH QUESTIONNAIRE - PHQ9
5. POOR APPETITE OR OVEREATING: NOT AT ALL
SUM OF ALL RESPONSES TO PHQ QUESTIONS 1-9: 2

## 2024-04-02 ASSESSMENT — ANXIETY QUESTIONNAIRES
5. BEING SO RESTLESS THAT IT IS HARD TO SIT STILL: NOT AT ALL
7. FEELING AFRAID AS IF SOMETHING AWFUL MIGHT HAPPEN: NOT AT ALL
IF YOU CHECKED OFF ANY PROBLEMS ON THIS QUESTIONNAIRE, HOW DIFFICULT HAVE THESE PROBLEMS MADE IT FOR YOU TO DO YOUR WORK, TAKE CARE OF THINGS AT HOME, OR GET ALONG WITH OTHER PEOPLE: SOMEWHAT DIFFICULT
6. BECOMING EASILY ANNOYED OR IRRITABLE: SEVERAL DAYS
2. NOT BEING ABLE TO STOP OR CONTROL WORRYING: NOT AT ALL
3. WORRYING TOO MUCH ABOUT DIFFERENT THINGS: NOT AT ALL
1. FEELING NERVOUS, ANXIOUS, OR ON EDGE: SEVERAL DAYS
GAD7 TOTAL SCORE: 2
GAD7 TOTAL SCORE: 2

## 2024-04-02 NOTE — PATIENT INSTRUCTIONS
Treatment plan today   Follow up in 2 months (or sooner as needed)  Medications  Wellbutrin XL 300mg a day   STOP propranolol 10mg QID as needed anxiety, okay to take in evening, okay to try 2 tabs  Multivitamin   Start L methylfolate once a day   INCREASE Vilazodone (Viibryd) 20mg a day   Vitamin D3 9461-4989 international units once a day   Therapy & Psychological testing - call [phone] to set up   You can also try to contact Anxiety treatment resources  [email or web address].anxietytreatmentresources.com/ for therapy services  Communication  Call the psychiatric nurse line with medication questions or concerns at [phone] or [phone].  [employer] may be used to communicate with your care team, but this is not intended to be used for emergencies.  You can call the above number to make appointments, leave a message with our nursing team, and inquire about any mental health referrals I have placed.  Please call your pharmacy to request a refill of your medications listed above if needed between appointments.   Safety Plan - see below for crisis resources   Call or text [phone] for mental health crisis.   Call 911 or use ER for potentially life-threatening situations.     GOKUL Clay, CNP, PMHNP  Collaborative Care Psychiatry Service (CCPS)    [facility]         RESOURCES     Crisis Resources  For emergency help, please call 911 or go to the nearest Emergency Department.     Emergency Walk-In Options:   [facility] ([place]): [phone] - Specialized mental health emergency area designed to be calming  [facility] ([place]): [phone]  [facility] ([place]): [phone]  [facility]): [phone]    [facility] Crisis Information:   [place]: [phone]  [place]: [phone]  [place] (JUAN) - Adult: [phone]     Child: [phone]  [place] - Adult: [phone]     Child: [phone]  [place]:  [phone]  List of all [facility] resources:   [email or web address]/people-[email or web address]-care/mental-health/resources/crisis-contacts.jsp    National Crisis Information:   National Suicide & Crisis Lifeline: Call 988   For online chat options, visit https://suicidepreventionlifeline.org/chat/  Poison Control Center: 7-436-279-8521  Poison Control Center: 6-208-156-1937  Trans Lifeline: 1-925.234.6724 - Hotline for transgender people of all ages  [employer]: 9-019-847-5172 - Hotline for LGBT youth     For Non-Emergency Support:   Fast Tracker: Mental Health & Substance Use Disorder Resources -   https://www.Attracta.org/    Additional Resources  Financial Assistance [phone]  [facility] Billing [phone]  Central Billing Office, [facility]: [phone]  [place] Billing [phone]  Medical Records [phone]  [place] Patient Bill of Rights [email or web address]         Patient Education   Collaborative Care Psychiatry Service  What to Expect  Here's what to expect from your Collaborative Care Psychiatry Service (CCPS).   About CCPS  CCPS means 2 people work together to help you get better. You'll meet with a behavioral health clinician and a psychiatric doctor. A behavioral health clinician helps people with mental health problems by talking with them. A psychiatric doctor helps people by giving them medicine.  How it works  At every visit, you'll see the behavioral health clinician (BHC) first. They'll talk with you about how you're doing and teach you how to feel better.   Then you'll see the psychiatric doctor. This doctor can help you deal with troubling thoughts and feelings by giving you medicine. They'll make sure you know the plan for your care.   CCPS usually takes 3 to 6 visits. If you need more visits, we may have you start seeing a different psychiatric doctor for ongoing care.  If you have any questions or  "concerns, we'll be glad to talk with you.  About visits  Be open  At your visits, please talk openly about your problems. It may feel hard, but it's the best way for us to help you.  Cancelling visits  If you can't come to your visit, please call us right away at [phone]. If you don't cancel at least 24 hours (1 full day) before your visit, that's \"late cancellation.\"  Being late to visits  Being very late is the same as not showing up. You will be a \"no show\" if:  Your appointment starts with a [facility], and you're more than 15 minutes late for a 30-minute (half hour) visit. This will also cancel your appointment with the psychiatric doctor.  Your appointment is with a psychiatric doctor only, and you're more than 15 minutes late for a 30-minute (half hour) visit.  Your appointment is with a psychiatric doctor only, and you're more than 30 minutes late for a 60-minute (full hour) visit.  If you cancel late or don't show up 2 times within 6 months, we may end your care.   Getting help between visits  If you need help between visits, you can call us Monday to Friday from 8 a.m. to 4:30 p.m. at [phone].  Emergency care  Call 911 or go to the nearest emergency department if your life or someone else's life is in danger.  Call 988 anytime to reach the national Suicide and Crisis hotline.  Medicine refills  To refill your medicine, call your pharmacy. You can also call [facility]'s Behavioral Access at [phone], Monday to Friday, 8 a.m. to 4:30 p.m. It can take 1 to 3 business days to get a refill.   Forms, letters, and tests  You may have papers to fill out, like FMLA, short-term disability, and workability. We can help you with these forms at your visits, but you must have an appointment. You may need more than 1 visit for this, to be in an intensive therapy program, or both.  Before we can give you medicine for ADHD, we may refer you to get tested for it or confirm it another way.  We may not be " able to give you an emotional support animal letter.  We don't do mental health checks ordered by the court.   We don't do mental health testing, but we can refer you to get tested.   Thank you for choosing us for your care.  For informational purposes only. Not to replace the advice of your health care provider. Copyright   2022 [facility]. All rights reserved. [employer] 236971 - 12/22.

## 2024-04-02 NOTE — PROGRESS NOTES
Virtual Visit Details    Type of service:  Video Visit     Originating Location (pt. Location): Home    Distant Location (provider location):  Off-site  Platform used for Video Visit: [facility] [place] [address], [place], [place], [id number] [place] [address]  [phone]     Mode of Visit: Telemedicine Visit: The patient's condition can be safely assessed and treated via synchronous audio and visual telemedicine encounter.      Collaborative Care Psychiatry Service (CCPS)  Outpatient Psychiatric Progress note    IDENTIFYING DATA   Name:  Yvette Garcia   Preferred name: Melinda    : 1980/  year old     Melinda currently lives in [place] [address] with Spouse/Partner and Children. 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     PCP: Maryuri Jonas MD   Psychotherapist: None currently  INTERIM HISTORY   CC: CCPS follow-up appointment  The patient was last seen on 24 for Return Visit, at last visit continued Bupropion XL 300mg and viibryd 20mg a day (propranolol AS NEEDED)  SUBJECTIVE    She reports she has been doing well, she isn't sure if she isn't sure if she is better or worse, is about the same.   She does note she is feeling better from the beginning  Still struggling with fatigue, but mood has been a lot better  Feels the fatigue has reduced  She is napping most days, not as bad as it was.   Sleep: has been good, sometimes difficulty getting back to sleep.   Diet: average  She did cut out alcohol more, less overall.   Propranolol has not been needed.   Anxiety is manageable   Reports most of the anxiety is social anxiety -   PHQ9      2024    10:10 AM 2024    10:07 AM 2023     1:15 PM   PHQ   PHQ-9 Total Score 2  6   Q9: Thoughts of better off dead/self-harm past 2 weeks Not at all Not at all Not at all     GAD7       4/2/2024    10:07 AM 9/15/2023     8:59 AM 8/17/2023     2:02 PM   WILFREDO-7 SCORE   Total Score  2 (minimal anxiety) 10 (moderate anxiety)   Total Score 2 2 10     OBJECTIVE     Current Outpatient Medications   Medication    acetaminophen (TYLENOL) 325 MG tablet    buPROPion (WELLBUTRIN XL) 300 MG 24 hr tablet    cetirizine (ZYRTEC) 10 MG tablet    desogestrel-ethinyl estradiol (KARIVA) 0.15-0.02/0.01 MG (21/5) tablet    ibuprofen (ADVIL,MOTRIN) 400 MG tablet    propranolol (INDERAL) 10 MG tablet    vilazodone (VIIBRYD) 20 MG TABS tablet     No current facility-administered medications for this visit.        PDMP Review       None             Vitals There were no vitals taken for this visit.        No data to display                Labs:    Latest Reference Range & Units 11/29/23 11:42   T4 Free 0.90 - 1.70 ng/dL 0.99   TSH 0.30 - 4.20 uIU/mL 2.14     Lab on 11/15/2023   Component Date Value Ref Range Status    Vitamin D, Total (25-Hydroxy) 11/15/2023 34  20 - 50 ng/mL Final    optimum levels    Vitamin B12 11/15/2023 276  232 - 1,245 pg/mL Final    Sodium 11/15/2023 142  135 - 145 mmol/L Final    Reference intervals for this test were updated on 09/26/2023 to more accurately reflect our healthy population. There may be differences in the flagging of prior results with similar values performed with this method. Interpretation of those prior results can be made in the context of the updated reference intervals.     Potassium 11/15/2023 4.4  3.4 - 5.3 mmol/L Final    Carbon Dioxide (CO2) 11/15/2023 25  22 - 29 mmol/L Final    Anion Gap 11/15/2023 10  7 - 15 mmol/L Final    Urea Nitrogen 11/15/2023 10.7  6.0 - 20.0 mg/dL Final    Creatinine 11/15/2023 0.91  0.51 - 0.95 mg/dL Final    GFR Estimate 11/15/2023 81  >60 mL/min/1.73m2 Final    Calcium 11/15/2023 9.4  8.6 - 10.0 mg/dL Final    Chloride 11/15/2023 107  98 - 107 mmol/L Final    Glucose 11/15/2023 102 (H)  70 - 99 mg/dL Final    Alkaline Phosphatase 11/15/2023  69  40 - 150 U/L Final    Reference intervals for this test were updated on 11/14/2023 to more accurately reflect our healthy population. There may be differences in the flagging of prior results with similar values performed with this method. Interpretation of those prior results can be made in the context of the updated reference intervals.    AST 11/15/2023 27  0 - 45 U/L Final    Reference intervals for this test were updated on 6/12/2023 to more accurately reflect our healthy population. There may be differences in the flagging of prior results with similar values performed with this method. Interpretation of those prior results can be made in the context of the updated reference intervals.    ALT 11/15/2023 27  0 - 50 U/L Final    Reference intervals for this test were updated on 6/12/2023 to more accurately reflect our healthy population. There may be differences in the flagging of prior results with similar values performed with this method. Interpretation of those prior results can be made in the context of the updated reference intervals.      Protein Total 11/15/2023 6.9  6.4 - 8.3 g/dL Final    Albumin 11/15/2023 4.2  3.5 - 5.2 g/dL Final    Bilirubin Total 11/15/2023 <0.2  <=1.2 mg/dL Final    Folic Acid 11/15/2023 17.0  4.6 - 34.8 ng/mL Final    TSH 11/15/2023 3.22  0.30 - 4.20 uIU/mL Final    Free T4 11/15/2023 0.88 (L)  0.90 - 1.70 ng/dL Final    Iron 11/15/2023 85  37 - 145 ug/dL Final    Iron Binding Capacity 11/15/2023 301  240 - 430 ug/dL Final    Iron Sat Index 11/15/2023 28  15 - 46 % Final       EKG: Most recent EKG from 9/11/2019 reviewed. QTc interval 401.  Sinus  Rhythm   -RSR(V1) -nondiagnostic.     MSE  Appearance: Awake, alert, adequately groomed, appeared stated age  Behavior: cooperative and pleasant  Eye Contact:  intact  Gait and motor coordination: normal   Psychomotor Behavior:  no evidence of tardive dyskinesia, dystonia, or tics  Attention and Concentration: Good  Speech: Clear,  "coherent, regular rate, rhythm and volume  Mood:  \"okay\"  Affect:  neutral  Associations:  no loose associations  Orientation: oriented to time, place and person  Thought process:  logical, linear, and goal-directed  Thought content: no evidence of suicidal ideation or homicidal ideation  Does not appear to be responding to internal stimuli.    Memory: Grossly intact as assessed by interview. Not formally assessed  Fund of knowledge: Average  Insight: Good  Judgement: Good  HISTORY   Psychotropic medications at evaluation 10/9/2023   Wellbutrin XL 150mg a day   Buspirone 30mg TWICE DAILY   Prozac 40mg a day   Propranolol 10mg QID AS NEEDED anxiety     Past Psychotropic Medication Trials  Citalopram (Celexa) stopped 1/2023 up to 30mg a day   Wellbutrin XL 450mg, did not help.   Prozac 40 - limited benefit  Buspirone - limited benefit  Propranolol - not needed, was stopped 4/24    Allergies   Allergen Reactions    Seasonal Allergies       Active Ambulatory Problems     Diagnosis Date Noted    Current moderate episode of major depressive disorder without prior episode (H) 09/11/2019    WILFREDO (generalized anxiety disorder) 09/11/2019     Resolved Ambulatory Problems     Diagnosis Date Noted    NO ACTIVE PROBLEMS 11/30/2012    Labor and delivery indication for care or intervention 09/06/2013    Active labor 09/06/2013    Normal delivery 09/06/2013    Indication for care in labor or delivery 05/18/2016    Liveborn infant 05/18/2016    Labor, precipitous, delivered 05/18/2016    Back pain 06/19/2018     Past Medical History:   Diagnosis Date    Depressive disorder     History of asthma     Uncomplicated asthma 1992      DIAGNOSIS   DSM   WILFREDO (generalized anxiety disorder)  Chronic fatigue     Medical Comorbidities: See above    ASSESSMENT   Formulation/MDM  For a more comprehensive formulation, refer to initial CCPS assessment. Pt C/o more social anxiety symptoms would like to try higher dose of viibryd. I have recommended " functional medicine for chronic fatigue and with history of subclinical thyroid changes. Pt requested to stop propranolol, she is no longer taking or feels she needs it. Discussed short term model of care and considering returning to PCP.     Safety:  Low risk for harm towards self or others. Safety plan reviewed as indicated. Local community safety resources provided to patient to use if needed and reviewed for patient to use if needed.    Psychoeducation:   Medication side effects, treatment recommendations, risks/benefits and alternatives reviewed. Reviewed recommended treatment, risks, benefits, and alternatives, coordination of care with other clinicians, and medication education.   All questions addressed  PLAN   Follow up: 2 months  STATUS: Patient Status: Patient will continue to be seen for ongoing consultation and stabilization.   Medication - no changes today  Wellbutrin XL 300mg a day   STOP propranolol 10mg QID as needed anxiety, okay to take in evening, okay to try 2 tabs  Multivitamin   Start L methylfolate once a day   INCREASE Vilazodone (Viibryd) 20mg a day   Vitamin D3 0730-6657 international units once a day   Labs/Orders: Follow up with PCP on thyroid results  Referrals: Therapy Referral submitted ([facility]). Call [facility] at [phone] if you do not hear from them soon   Pt has been referred to Psychological testing - needs to be rescheudled   Continue all other treatments (including medications) per primary care provider and/or specialists, follow up with primary care provider as planned or for acute medical concerns.    GOKUL Clay, CNP, PMHNP  Collaborative Care Psychiatry Service (CCPS)  [facility]  ADMINISTRATIVE BILLING   Video/Phone Start Time: 1002  Video/Phone End Time: 1017  Level of Medical Decision Making:   - At least 1 chronic problem that is not stable  - Engaged in prescription drug management during visit (discussed any medication benefits, side  effects, alternatives, etc.)  {Complexity / amount of data reviewed / analyzed (Optional):170955     EPISODE [x]   Disclaimer: This note consists of symbols derived from keyboarding, dictation and/or voice recognition software. As a result, there may be errors in the script that have gone undetected. Please consider this when interpreting information found in this chart.

## 2024-04-02 NOTE — NURSING NOTE
Is the patient currently in the state of [place]? YES    Visit mode:VIDEO    If the visit is dropped, the patient can be reconnected by: VIDEO VISIT: Text to cell phone:   Telephone Information:   Mobile [phone]       Will anyone else be joining the visit? No  (If patient encounters technical issues they should call [phone])    How would you like to obtain your AVS? MyChart    Are changes needed to the allergy or medication list? Pt stated no changes to allergies and Pt stated no med changes    Rooming Documentation: Assigned questionnaire(s) completed .    Reason for visit: IVÁN Gardner, ELLIOTTF

## 2024-05-06 ENCOUNTER — VIRTUAL VISIT (OUTPATIENT)
Dept: PEDIATRICS | Facility: CLINIC | Age: 44
End: 2024-05-06
Payer: COMMERCIAL

## 2024-05-06 DIAGNOSIS — J02.0 STREPTOCOCCAL PHARYNGITIS: Primary | ICD-10-CM

## 2024-05-06 PROCEDURE — 99213 OFFICE O/P EST LOW 20 MIN: CPT | Mod: 95 | Performed by: STUDENT IN AN ORGANIZED HEALTH CARE EDUCATION/TRAINING PROGRAM

## 2024-05-06 RX ORDER — CEPHALEXIN 500 MG/1
500 CAPSULE ORAL 2 TIMES DAILY
Qty: 20 CAPSULE | Refills: 0 | Status: SHIPPED | OUTPATIENT
Start: 2024-05-06 | End: 2024-05-16

## 2024-05-06 NOTE — PATIENT INSTRUCTIONS
Strep Throat: Care Instructions  Overview     Strep throat is a bacterial infection that causes sudden, severe sore throat and fever. Symptoms may include red, swollen tonsils that may have white or yellow patches. A strep test may be needed to tell if the sore throat is caused by strep bacteria. Treatment can help ease symptoms and may prevent future problems.  Follow-up care is a key part of your treatment and safety. Be sure to make and go to all appointments, and call your doctor if you are having problems. It's also a good idea to know your test results and keep a list of the medicines you take.  How can you care for yourself at home?  Take your antibiotics as directed. Do not stop taking them just because you feel better. You need to take the full course of antibiotics.  Strep throat can spread to others until 24 hours after you begin taking antibiotics. During this time, avoid contact with other people at work, school, or home, especially infants and children. Do not sneeze or cough on others, and wash your hands often. Keep your drinking glass and eating utensils separate from those of others. Wash these items well in hot, soapy water.  Gargle with warm salt water several times a day to help reduce swelling and relieve pain. Mix 1/2 teaspoon of salt in 1 cup of warm water.  Take an over-the-counter pain medication, such as acetaminophen (Tylenol), ibuprofen (Advil, Motrin), or naproxen (Aleve). Read and follow all instructions on the label.  Try an over-the-counter anesthetic throat spray or throat lozenges, which may help relieve throat pain.  Drink plenty of fluids. Fluids may help soothe an irritated throat. Hot fluids, such as tea or soup, may help your throat feel better.  Eat soft solids and drink plenty of liquids. Flavored ice pops, ice cream, scrambled eggs, sherbet, and gelatin dessert (such as Jell-O) may also soothe the throat.  Get lots of rest.  If you smoke, try to quit. If you can't quit, cut  "back as much as you can. Smoking can interfere with healing. If you need help quitting, talk to your doctor about stop-smoking programs and medicines. These can increase your chances of quitting for good.  Use a vaporizer or humidifier to add moisture to the air where you sleep. Follow the directions for cleaning the machine.  When should you call for help?   Call your doctor now or seek immediate medical care if:    You have new or worse symptoms of infection, such as:  A new or higher fever.  A fever with a stiff neck or severe headache.  New or worse trouble swallowing.  Pain that becomes much worse on one side of your throat.   Watch closely for changes in your health, and be sure to contact your doctor if:    You are not getting better after 2 days (48 hours) after taking an antibiotic.   Where can you learn more?  Go to https://www.[employer].net/patiented  Enter K625 in the search box to learn more about \"Strep Throat: Care Instructions.\"  Current as of: September 27, 2023               Content Version: 14.0    7032-0532 [employer]trax.   Care instructions adapted under license by your healthcare professional. If you have questions about a medical condition or this instruction, always ask your healthcare professional. [employer] disclaims any warranty or liability for your use of this information.      "

## 2024-05-06 NOTE — PROGRESS NOTES
"Melinda is a 43 year old who is being evaluated via a billable video visit.          Assessment & Plan     Streptococcal pharyngitis  Will switch to keflex given no improvement in symptoms on amoxicillin. If not improving, recommend in person evaluation to see if imaging or consideration of drainage needed. Discussed plan of care and reasons to return to clinic or present to the ED (emergency department). Patient and/or guardian in agreement with plan.  - cephALEXin (KEFLEX) 500 MG capsule; Take 1 capsule (500 mg) by mouth 2 times daily for 10 days            BMI  Estimated body mass index is 25.52 kg/m  as calculated from the following:    Height as of 12/12/23: 1.657 m (5' 5.24\").    Weight as of 12/12/23: 70.1 kg (154 lb 8 oz).             Subjective   Melinda is a 43 year old, presenting for the following health issues:  No chief complaint on file.    History of Present Illness       Reason for visit:  Strep + on Saturday AM, on 500mg Amox BID since Saturday AM, not improving, seems to be getting worse    She eats 2-3 servings of fruits and vegetables daily.She consumes 1 sweetened beverage(s) daily.She exercises with enough effort to increase her heart rate 30 to 60 minutes per day.  She exercises with enough effort to increase her heart rate 4 days per week.   She is taking medications regularly.     Daughter diagnosed Thursday and improved with amoxicillin (Amoxil)  Patient had positive Strep test over weekend and started amoxicillin with no improvement in symptoms  -has had Strep before when kids have gotten it and amoxicillin usually helps  No dysphagia  Taking ibuprogen 800mg q6h                Objective           Vitals:  No vitals were obtained today due to virtual visit.    Physical Exam   GENERAL: alert and no distress  EYES: Eyes grossly normal to inspection.  No discharge or erythema, or obvious scleral/conjunctival abnormalities.  RESP: No audible wheeze, cough, or visible cyanosis.    SKIN: Visible " skin clear. No significant rash, abnormal pigmentation or lesions.  NEURO: Cranial nerves grossly intact.  Mentation and speech appropriate for age.  PSYCH: Appropriate affect, tone, and pace of words          Video-Visit Details    Type of service:  Video Visit   Originating Location (pt. Location): Home    Distant Location (provider location):  On-site  Platform used for Video Visit: Kristine  Signed Electronically by: Deirdre E. Milligan, MD

## 2024-05-22 ENCOUNTER — TRANSFERRED RECORDS (OUTPATIENT)
Dept: HEALTH INFORMATION MANAGEMENT | Facility: CLINIC | Age: 44
End: 2024-05-22
Payer: COMMERCIAL

## 2024-06-04 ENCOUNTER — VIRTUAL VISIT (OUTPATIENT)
Dept: PSYCHIATRY | Facility: CLINIC | Age: 44
End: 2024-06-04
Payer: COMMERCIAL

## 2024-06-04 DIAGNOSIS — F41.1 GAD (GENERALIZED ANXIETY DISORDER): ICD-10-CM

## 2024-06-04 DIAGNOSIS — F32.1 CURRENT MODERATE EPISODE OF MAJOR DEPRESSIVE DISORDER WITHOUT PRIOR EPISODE (H): ICD-10-CM

## 2024-06-04 PROCEDURE — 99214 OFFICE O/P EST MOD 30 MIN: CPT | Mod: 95 | Performed by: NURSE PRACTITIONER

## 2024-06-04 PROCEDURE — G2211 COMPLEX E/M VISIT ADD ON: HCPCS | Mod: 95 | Performed by: NURSE PRACTITIONER

## 2024-06-04 RX ORDER — VILAZODONE HYDROCHLORIDE 40 MG/1
40 TABLET ORAL DAILY
Qty: 90 TABLET | Refills: 0 | Status: SHIPPED | OUTPATIENT
Start: 2024-06-04 | End: 2024-09-03

## 2024-06-04 RX ORDER — KETOCONAZOLE 20 MG/ML
SHAMPOO TOPICAL
COMMUNITY
Start: 2024-05-22

## 2024-06-04 RX ORDER — SPIRONOLACTONE 100 MG/1
TABLET, FILM COATED ORAL
COMMUNITY
Start: 2024-05-28

## 2024-06-04 ASSESSMENT — PAIN SCALES - GENERAL: PAINLEVEL: NO PAIN (0)

## 2024-06-04 NOTE — NURSING NOTE
Is the patient currently in the state of [place]? YES    Visit mode:VIDEO    If the visit is dropped, the patient can be reconnected by: VIDEO VISIT: Text to cell phone:   Telephone Information:   Mobile [phone]       Will anyone else be joining the visit? NO  (If patient encounters technical issues they should call [phone]    How would you like to obtain your AVS? MyChart    Are changes needed to the allergy or medication list? Pt stated no changes to allergies and Pt stated no med changes    Are refills needed on medications prescribed by this physician? NO      Reason for visit: IVÁN MACIAS

## 2024-06-04 NOTE — PATIENT INSTRUCTIONS
Treatment plan today   Follow up in 3 months (or sooner as needed)  Medications  Wellbutrin XL 300mg a day   Multivitamin   L methylfolate once a day up to 15mg a day  INCREASE Vilazodone (Viibryd) 40mg a day   Vitamin D3 3915-4708 international units once a day   CBT therapy  [email or web address].[email or web address]/what-[email or web address]  Anxiety treatment resources - https://www.anxietytreatmentresources.com/  Communication  Call the psychiatric nurse line with medication questions or concerns at [phone] or [phone].  TopDown Conservationhart may be used to communicate with your care team, but this is not intended to be used for emergencies.  You can call the above number to make appointments, leave a message with our nursing team, and inquire about any mental health referrals I have placed.  Please call your pharmacy to request a refill of your medications listed above if needed between appointments.   Safety Plan - see below for crisis resources   Call or text [phone] for mental health crisis.   Call 911 or use ER for potentially life-threatening situations.     GOKUL Clay, CNP, PMHNP  Collaborative Care Psychiatry Service (CCPS)    [facility]         RESOURCES     Crisis Resources  For emergency help, please call 911 or go to the nearest Emergency Department.     Emergency Walk-In Options:   EmPATH Unit @ [facility] ([place]): [phone] - Specialized mental health emergency area designed to be calming  [facility] ([place]): [phone]  [facility] ([place]): [phone]  [facility]): [phone]    County Crisis Information:   [place]: [phone]  [place]: [phone]  [place] (COPE) - Adult: [phone]     Child: [phone]  [place] - Adult: [phone]     Child: [phone]  [place]: [phone]  List of all [facility] resources:    https://[place].gov/dhs/people-we-serve/adults/health-care/mental-health/resources/crisis-contacts.jsp    National Crisis Information:   National Suicide & Crisis Lifeline: Call 988   For online chat options, visit https://suicidepreventionlifeline.org/chat/  Poison Control Center: 5-331-649-1250  Poison Control Center: 6-744-247-1328  Trans Lifeline: 1-776.820.6595 - Hotline for transgender people of all ages  The [employer] Project: 7-996-498-6377 - Hotline for LGBT youth     For Non-Emergency Support:   Fast Tracker: Mental Health & Substance Use Disorder Resources -   https://www.Message Bus.org/    Additional Resources  Financial Assistance [phone]  [facility] Billing [phone]  [place] Billing Office, [facility]: [phone]  [place] Billing [phone]  Medical Records [phone]  [place] Patient Bill of Rights [email or web address]         Patient Education   Collaborative Care Psychiatry Service  What to Expect  Here's what to expect from your Collaborative Care Psychiatry Service (CCPS).   About CCPS  CCPS means 2 people work together to help you get better. You'll meet with a behavioral health clinician and a psychiatric doctor. A behavioral health clinician helps people with mental health problems by talking with them. A psychiatric doctor helps people by giving them medicine.  How it works  At every visit, you'll see the behavioral health clinician (BHC) first. They'll talk with you about how you're doing and teach you how to feel better.   Then you'll see the psychiatric doctor. This doctor can help you deal with troubling thoughts and feelings by giving you medicine. They'll make sure you know the plan for your care.   CCPS usually takes 3 to 6 visits. If you need more visits, we may have you start seeing a different psychiatric doctor for ongoing care.  If you have any questions or concerns, we'll be glad to talk with you.  About  "visits  Be open  At your visits, please talk openly about your problems. It may feel hard, but it's the best way for us to help you.  Cancelling visits  If you can't come to your visit, please call us right away at [phone]. If you don't cancel at least 24 hours (1 full day) before your visit, that's \"late cancellation.\"  Being late to visits  Being very late is the same as not showing up. You will be a \"no show\" if:  Your appointment starts with a C, and you're more than 15 minutes late for a 30-minute (half hour) visit. This will also cancel your appointment with the psychiatric doctor.  Your appointment is with a psychiatric doctor only, and you're more than 15 minutes late for a 30-minute (half hour) visit.  Your appointment is with a psychiatric doctor only, and you're more than 30 minutes late for a 60-minute (full hour) visit.  If you cancel late or don't show up 2 times within 6 months, we may end your care.   Getting help between visits  If you need help between visits, you can call us Monday to Friday from 8 a.m. to 4:30 p.m. at [phone].  Emergency care  Call 911 or go to the nearest emergency department if your life or someone else's life is in danger.  Call 988 anytime to reach the national Suicide and Crisis hotline.  Medicine refills  To refill your medicine, call your pharmacy. You can also call [facility]'s Behavioral Access at [phone], Monday to Friday, 8 a.m. to 4:30 p.m. It can take 1 to 3 business days to get a refill.   Forms, letters, and tests  You may have papers to fill out, like FMLA, short-term disability, and workability. We can help you with these forms at your visits, but you must have an appointment. You may need more than 1 visit for this, to be in an intensive therapy program, or both.  Before we can give you medicine for ADHD, we may refer you to get tested for it or confirm it another way.  We may not be able to give you an emotional support animal " letter.  We don't do mental health checks ordered by the court.   We don't do mental health testing, but we can refer you to get tested.   Thank you for choosing us for your care.  For informational purposes only. Not to replace the advice of your health care provider. Copyright   2022 [facility]. All rights reserved. [employer] [id number] - 12/22.

## 2024-06-04 NOTE — PROGRESS NOTES
"  Virtual Visit Details    Type of service:  Video Visit     Originating Location (pt. Location): Home    Distant Location (provider location):  Off-site  Platform used for Video Visit: [facility] [place] [address], [place], [place], [id number] [place] [address]  [phone]     Mode of Visit: Telemedicine Visit: The patient's condition can be safely assessed and treated via synchronous audio and visual telemedicine encounter.      Collaborative Care Psychiatry Service (CCPS)  Outpatient Psychiatric Progress note    IDENTIFYING DATA   Name:  Yvette Garcia   Preferred name: Melinda    : 1980/  year old     Melinda currently lives in [place] [address] with Spouse/Partner and Children. 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     PCP: Maryuri Jonas MD   Psychotherapist: Couples therapy   INTERIM HISTORY   CC: CCPS follow-up appointment  The patient was last seen on 24 for Return Visit, at last visit increased viibryd to 30mg a day   SUBJECTIVE    She called between visits and noted losing hair, reached out.   Mood has been \"all over the place\"   Stress w/ the summer  Oldest child has been having more issues. Is anxious related to her children   She and  are going to couples therapy and pt goes to therapist  Feels like symptoms manageable and feels like it has been more situations.   She doesn't think anything has changed with the 20mg to 30mg  Sleep has been good. Napping most days.   Reports feeling tired all the time, doesn't feel the need to get back into bed.   Social anxiety has been poor   She attended a few sessions of individual therapy and feels like she has a hard time to talk because of her social anxiety.     PHQ9      2024    10:10 AM 2024    10:07 AM 2023     1:15 PM   PHQ   PHQ-9 Total Score 2  6 "   Q9: Thoughts of better off dead/self-harm past 2 weeks Not at all Not at all Not at all     GAD7      4/2/2024    10:07 AM 9/15/2023     8:59 AM 8/17/2023     2:02 PM   WILFREDO-7 SCORE   Total Score  2 (minimal anxiety) 10 (moderate anxiety)   Total Score 2 2 10     OBJECTIVE     Current Outpatient Medications   Medication Sig Dispense Refill    acetaminophen (TYLENOL) 325 MG tablet Take 2 tablets (650 mg) by mouth every 4 hours as needed for other (mild pain) 100 tablet 0    buPROPion (WELLBUTRIN XL) 300 MG 24 hr tablet Take 1 tablet (300 mg) by mouth every morning 90 tablet 3    cetirizine (ZYRTEC) 10 MG tablet Take 10 mg by mouth daily as needed for allergies      desogestrel-ethinyl estradiol (KARIVA) 0.15-0.02/0.01 MG (21/5) tablet Take 1 tablet by mouth daily 84 tablet 3    ibuprofen (ADVIL,MOTRIN) 400 MG tablet Take 1 tablet (400 mg) by mouth every 6 hours as needed for other (cramping) 30 tablet     vilazodone (VIIBRYD) 10 MG TABS tablet Take 1 tablet (10 mg) by mouth daily With 20mg tab for total of 30mg a day 30 tablet 2    vilazodone (VIIBRYD) 20 MG TABS tablet Take 1 tablet (20 mg) by mouth daily With 10mg tab for total of 30mg a day 90 tablet 0     No current facility-administered medications for this visit.      [facility]  PDMP Review         Value Time User    State PDMP site checked  Yes 6/4/2024 10:35 AM Stefania White APRN CNP             Vitals There were no vitals taken for this visit.        No data to display                Labs:    Latest Reference Range & Units 11/29/23 11:42   T4 Free 0.90 - 1.70 ng/dL 0.99   TSH 0.30 - 4.20 uIU/mL 2.14       EKG: Most recent EKG from 9/11/2019 reviewed. QTc interval 401.  Sinus  Rhythm   -RSR(V1) -nondiagnostic.     MSE  Appearance: Awake, alert, adequately groomed, appeared stated age  Behavior: cooperative and pleasant  Eye Contact:  intact  Gait and motor coordination: normal   Psychomotor Behavior:  no evidence of tardive dyskinesia, dystonia, or  "tics  Attention and Concentration: Good  Speech: Clear, coherent, regular rate, rhythm and volume  Mood:  \"okay\"  Affect:  neutral, anxious  Associations:  no loose associations  Orientation: oriented to time, place and person  Thought process:  logical, linear, and goal-directed  Thought content: no evidence of suicidal ideation or homicidal ideation  Does not appear to be responding to internal stimuli.    Memory: Grossly intact as assessed by interview. Not formally assessed  Fund of knowledge: Average  Insight: Good  Judgement: Good  HISTORY   Psychotropic medications at evaluation 10/9/2023   Wellbutrin XL 150mg a day   Buspirone 30mg TWICE DAILY   Prozac 40mg a day   Propranolol 10mg QID AS NEEDED anxiety     Past Psychotropic Medication Trials  Citalopram (Celexa) stopped 1/2023 up to 30mg a day   Wellbutrin XL 450mg, did not help.   Prozac 40 - limited benefit  Buspirone - limited benefit  Propranolol - not needed, was stopped 4/24    Allergies   Allergen Reactions    Seasonal Allergies       Active Ambulatory Problems     Diagnosis Date Noted    Current moderate episode of major depressive disorder without prior episode (H) 09/11/2019    WILFREDO (generalized anxiety disorder) 09/11/2019     Resolved Ambulatory Problems     Diagnosis Date Noted    NO ACTIVE PROBLEMS 11/30/2012    Labor and delivery indication for care or intervention 09/06/2013    Active labor 09/06/2013    Normal delivery 09/06/2013    Indication for care in labor or delivery 05/18/2016    Liveborn infant 05/18/2016    Labor, precipitous, delivered 05/18/2016    Back pain 06/19/2018     Past Medical History:   Diagnosis Date    Depressive disorder     History of asthma     Uncomplicated asthma 1992      DIAGNOSIS   DSM   WILFREDO (generalized anxiety disorder)  Chronic fatigue     Medical Comorbidities: See above    ASSESSMENT   Formulation/MDM  For a more comprehensive formulation, refer to initial CCPS assessment. Pt C/o more social anxiety " symptoms would like to try higher dose of viibryd. I have recommended functional medicine for chronic fatigue and with history of subclinical thyroid changes. Pt requested to stop propranolol, she is no longer taking or feels she needs it. Discussed short term model of care and considering returning to PCP. 6/4/24: Limited benefit w/viibryd 30mg. Increasing viibryd, limited involvement in therapy, recommended CBT and listed potential clinics to contact in AVS. Functional medicine has been recommended to target fatigue sx.     Safety:  Low risk for harm towards self or others. Safety plan reviewed as indicated. Local community safety resources provided to patient to use if needed and reviewed for patient to use if needed.    Psychoeducation:   Medication side effects, treatment recommendations, risks/benefits and alternatives reviewed. Reviewed recommended treatment, risks, benefits, and alternatives, coordination of care with other clinicians, and medication education.   All questions addressed  PLAN   Follow up: 3 months  STATUS: Patient Status: Patient will continue to be seen for ongoing consultation and stabilization.   Wellbutrin XL 300mg a day   Multivitamin   L methylfolate once a day up to 17mg a day   INCREASE Vilazodone (Viibryd) 40mg a day   Vitamin D3 6749-3631 international units once a day   Labs/Orders: Per derm  Referrals: Therapy Referral submitted ([facility]). Call [facility] at [phone] if you do not hear from them soon Additional referrals sent via AVS  Pt has been referred to Psychological testing - needs to be rescheudled   Continue all other treatments (including medications) per primary care provider and/or specialists, follow up with primary care provider as planned or for acute medical concerns.    GOKUL Clay, CNP, PMHNP  Collaborative Care Psychiatry Service (CCPS)  [facility]  ADMINISTRATIVE BILLING   Video/Phone Start Time: 1000  Video/Phone End Time:  1017  Level of Medical Decision Making:   - At least 1 chronic problem that is not stable  - Engaged in prescription drug management during visit (discussed any medication benefits, side effects, alternatives, etc.)  {Complexity / amount of data reviewed / analyzed (Optional):158002     EPISODE [x]   Disclaimer: This note consists of symbols derived from keyboarding, dictation and/or voice recognition software. As a result, there may be errors in the script that have gone undetected. Please consider this when interpreting information found in this chart.    The longitudinal plan of care for the diagnosis(es)/condition(s) as documented were addressed during this visit. Due to the added complexity in care, I will continue to support Melinda in the subsequent management and with ongoing continuity of care.

## 2024-07-19 ENCOUNTER — VIRTUAL VISIT (OUTPATIENT)
Dept: SLEEP MEDICINE | Facility: CLINIC | Age: 44
End: 2024-07-19
Payer: COMMERCIAL

## 2024-07-19 VITALS — WEIGHT: 145 LBS | BODY MASS INDEX: 23.3 KG/M2 | HEIGHT: 66 IN

## 2024-07-19 DIAGNOSIS — G47.33 OSA (OBSTRUCTIVE SLEEP APNEA): Primary | ICD-10-CM

## 2024-07-19 PROCEDURE — 99215 OFFICE O/P EST HI 40 MIN: CPT | Mod: 95 | Performed by: INTERNAL MEDICINE

## 2024-07-19 ASSESSMENT — SLEEP AND FATIGUE QUESTIONNAIRES
HOW LIKELY ARE YOU TO NOD OFF OR FALL ASLEEP WHILE WATCHING TV: HIGH CHANCE OF DOZING
HOW LIKELY ARE YOU TO NOD OFF OR FALL ASLEEP WHILE SITTING AND TALKING TO SOMEONE: WOULD NEVER DOZE
HOW LIKELY ARE YOU TO NOD OFF OR FALL ASLEEP IN A CAR, WHILE STOPPED FOR A FEW MINUTES IN TRAFFIC: SLIGHT CHANCE OF DOZING
HOW LIKELY ARE YOU TO NOD OFF OR FALL ASLEEP WHILE SITTING INACTIVE IN A PUBLIC PLACE: HIGH CHANCE OF DOZING
HOW LIKELY ARE YOU TO NOD OFF OR FALL ASLEEP WHILE SITTING QUIETLY AFTER LUNCH WITHOUT ALCOHOL: HIGH CHANCE OF DOZING
HOW LIKELY ARE YOU TO NOD OFF OR FALL ASLEEP WHILE LYING DOWN TO REST IN THE AFTERNOON WHEN CIRCUMSTANCES PERMIT: HIGH CHANCE OF DOZING
HOW LIKELY ARE YOU TO NOD OFF OR FALL ASLEEP WHEN YOU ARE A PASSENGER IN A CAR FOR AN HOUR WITHOUT A BREAK: HIGH CHANCE OF DOZING
HOW LIKELY ARE YOU TO NOD OFF OR FALL ASLEEP WHILE SITTING AND READING: HIGH CHANCE OF DOZING

## 2024-07-19 ASSESSMENT — PAIN SCALES - GENERAL: PAINLEVEL: NO PAIN (0)

## 2024-07-19 NOTE — PROGRESS NOTES
Virtual Visit Details    Type of service:  Video Visit     Originating Location (pt. Location): Home    Distant Location (provider location):  On-site  Platform used for Video Visit: Kristine    Presenting History:     Mrs. Yvette Garcia is a 43 years old female, with medical history significant for depression, anxiety, who presents for evaluation of excessive daytime sleepiness.  She was evaluated in clinic last year, but now presents with worsening of her symptoms and change in clinical presentation.    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 Some weeks, she has only 1 night shift and others she may have up to 3.  She lives with her  and 3 children.    Current medications include bupropion  mg daily and Vilazodone 40 mg daily.    5/17/2023 [facility] Diagnostic Sleep Study (145.0 lbs) - AHI 5.3, RDI 6.5, Supine AHI 15.0, REM AHI 0.7, Low O2 86.0%, Time Spent ?88% 0 minutes / Time Spent ?89% 0.2 minutes.  Since sleep apnea was mild, she did not pursue any interventions at the time.    Patient reports that since her teenage years, she has experienced long sleep hours and excessive daytime sleepiness.  It is not unusual for her to sleep 10 or 12 hours and take an additional 2 or 3 naps during the day.  Naps are reported to be unrefreshing.    Yvette goes to sleep at 10:00 PM during the week. She wakes up at 7:00 - 9:00 AM. She falls asleep in 5 minutes.  Yvette denies difficulty falling asleep.  She wakes up 0-1 times a night for 10 minutes before falling back to sleep.  Yvette wakes up to go to the bathroom.  On weekends, Yvette goes to sleep at 10:00 PM.  She wakes up at 9:00 AM. She falls asleep in 5 minutes.  Patient gets an average of 9 hours of sleep per night.      Yvette does snore frequently. Patient's   does complain of gasping and choking. She does have witnessed apneas.They never sleep separately.  Patient sleeps on  "her side and stomach. She has occasional morning dry mouth, denies no morning headaches.     She has intermittent restless leg symptoms, averaging once in a week, which is not a significant symptomatic burden.    Yvette denies any sleep walking, sleep talking, dream enactment, sleep paralysis, cataplexy, and hypnogogic/hypnopompic hallucinations.    Patient's [place] Sleepiness score 19/24 consistent with excessive  daytime sleepiness.      Yvette naps 5-6 times per week for 120-180 minutes, feels unrefreshed after naps. She takes some inadvertant naps.  She denies closing eyes, dozing, and falling asleep while driving. Patient was counseled on the importance of driving while alert, to pull over if drowsy, or nap before getting into the vehicle if sleepy.      She uses 2 cups/day of coffee. Last caffeine intake is usually before noon.    Patient Active Problem List   Diagnosis    Current moderate episode of major depressive disorder without prior episode (H)    WILFREDO (generalized anxiety disorder)     Social History     Tobacco Use    Smoking status: Never     Passive exposure: Never    Smokeless tobacco: Never   Vaping Use    Vaping status: Never Used   Substance Use Topics    Alcohol use: Yes     Comment: 1 drink weekly     Drug use: No     Ht 1.676 m (5' 6\")   Wt 65.8 kg (145 lb)   BMI 23.40 kg/m      Physical Examination:  GENERAL APPEARANCE: healthy, alert, and no distress  NEURO: mentation intact and speech normal  PSYCH: mentation appears normal and affect normal/bright    Impression and plan    Mild obstructive sleep apnea  Excessive daytime sleepiness  Depression    Patient gives a history of long sleep hours and excessive daytime sleepiness from her teenage years.  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  Excessive sleepiness predated night shifts.  She has adequate sleep intake which averages between 9 to 10 hours with 2 to 3-hour naps during the day which are unrefreshing.  She " denies history of sleep paralysis hypnagogic/hypnopompic hallucinations or cataplexy.  She is not on any significant sedating agents. Vilazodone may have small incidence of drowsiness.  As noted, PSG last year showed mild obstructive sleep apnea.    As a first step, I will have her initiate CPAP therapy for mild obstructive sleep apnea and see if there is any change in daytime sleepiness.  If not, we have to consider if she has a central disorder hypersomnia or hypersomnolence associated with depression.  Wake promoting agents can be a consideration at that point.    Plan:     Start auto titrating CPAP therapy with a pressure range of 5 to 15 cm H2O  Follow-up in a few months to review progress    I spent a total of 40 minutes for this appointment on this date of service which include time spent before, during and after the visit for chart review, patient care, counseling and coordination of care.      Dr. Nick Mittal

## 2024-07-19 NOTE — NURSING NOTE
Current patient location: Patient declined to provide     Is the patient currently in the state of [place]? YES    Visit mode:VIDEO    If the visit is dropped, the patient can be reconnected by: VIDEO VISIT: Text to cell phone:   Telephone Information:   Mobile [phone]       Will anyone else be joining the visit? NO  (If patient encounters technical issues they should call [phone] :[phone])    How would you like to obtain your AVS? MyChart    Are changes needed to the allergy or medication list? No    Are refills needed on medications prescribed by this physician? NO    Reason for visit: RECHECK    Rosio MACIAS

## 2024-07-31 ENCOUNTER — DOCUMENTATION ONLY (OUTPATIENT)
Dept: SLEEP MEDICINE | Facility: CLINIC | Age: 44
End: 2024-07-31
Payer: COMMERCIAL

## 2024-07-31 DIAGNOSIS — F41.9 ANXIETY: ICD-10-CM

## 2024-07-31 DIAGNOSIS — G47.33 OBSTRUCTIVE SLEEP APNEA (ADULT) (PEDIATRIC): Primary | ICD-10-CM

## 2024-07-31 DIAGNOSIS — G47.14 HYPERSOMNIA DUE TO ANOTHER MEDICAL CONDITION: ICD-10-CM

## 2024-07-31 NOTE — PROGRESS NOTES
Patient was offered choice of vendor and chose [facility].  Patient Yvette Garcia was set up at [facility] on July 31, 2024. Patient received a Resmed Airsense 11 Pressures were set at  5-15 cm H2O.   Patient s ramp is 5 cm H2O for Auto and FLEX/EPR is EPR, 2.  Patient received a Resmed Mask name: [id number]  Nasal mask size For Her, heated tubing and heated humidifier.  Patient has the following compliance requirements: none  Patient has a follow up on TBD with Dr. Mittal.    Shannon Lofton

## 2024-08-05 ENCOUNTER — DOCUMENTATION ONLY (OUTPATIENT)
Dept: SLEEP MEDICINE | Facility: CLINIC | Age: 44
End: 2024-08-05
Payer: COMMERCIAL

## 2024-08-05 NOTE — PROGRESS NOTES
3 day Sleep therapy management telephone visit    Diagnostic AHI:PS.3         Confirmed with patient at time of call- N/A Patient is still interested in STM service       Message left for patient to return call.        Objective data     Order Settings for PAP  CPAP min     CPAP max              Device settings from machine CPAP min 5.0     CPAP max 15.0           EPR Setting TWO    RESMED soft response  OFF     Assessment: Nightly usage most nights under four hours       Patient has the following upcoming sleep appts:      Replacement device: No  STM ordered by provider: Yes     Total time spent on accessing and  interpreting remote patient PAP therapy data  10 minutes    Total time spent counseling, coaching  and reviewing PAP therapy data with patient  1 minutes    [id number] no

## 2024-08-22 ENCOUNTER — DOCUMENTATION ONLY (OUTPATIENT)
Dept: SLEEP MEDICINE | Facility: CLINIC | Age: 44
End: 2024-08-22
Payer: COMMERCIAL

## 2024-08-22 NOTE — PROGRESS NOTES
14  DAY STM VISIT    Diagnostic AHI: PS.3         Message left for patient to return call.     Assessment: Pt not meeting objective benchmarks for compliance       Action plan: pt to have 30 day STM visit.      Device type: Auto-CPAP    PAP settings: CPAP min 5.0 cm  H20       CPAP max 15.0 cm  H20      CPAP fixed  cm  H20      95th% pressure 13.3 cm  H20        RESMED EPR level Setting: TWO    RESMED Soft response setting:  OFF    Mask type:      Objective measures: 14 day rolling measures      Compliance  42 %      Leak  17.3  lpm  last  upload      AHI 1.99   last  upload      Average number of minutes 214      Objective measure goal  Compliance   Goal >70%  Leak   Goal < 24 lpm  AHI  Goal < 5  Usage  Goal >240    Patient has the following upcoming sleep appts:      Total time spent on accessing and interpreting remote patient PAP therapy data  10 minutes    Total time spent counseling, coaching  and reviewing PAP therapy data with patient  1 minutes    [id number]  [id number]  no (3 day STM)

## 2024-08-27 ENCOUNTER — TRANSFERRED RECORDS (OUTPATIENT)
Dept: HEALTH INFORMATION MANAGEMENT | Facility: CLINIC | Age: 44
End: 2024-08-27
Payer: COMMERCIAL

## 2024-09-03 ENCOUNTER — VIRTUAL VISIT (OUTPATIENT)
Dept: PSYCHIATRY | Facility: CLINIC | Age: 44
End: 2024-09-03
Payer: COMMERCIAL

## 2024-09-03 DIAGNOSIS — F41.1 GAD (GENERALIZED ANXIETY DISORDER): Primary | ICD-10-CM

## 2024-09-03 DIAGNOSIS — F32.1 CURRENT MODERATE EPISODE OF MAJOR DEPRESSIVE DISORDER WITHOUT PRIOR EPISODE (H): ICD-10-CM

## 2024-09-03 DIAGNOSIS — R53.82 CHRONIC FATIGUE: ICD-10-CM

## 2024-09-03 PROCEDURE — 99214 OFFICE O/P EST MOD 30 MIN: CPT | Mod: 95 | Performed by: NURSE PRACTITIONER

## 2024-09-03 RX ORDER — BUPROPION HYDROCHLORIDE 300 MG/1
300 TABLET ORAL EVERY MORNING
Qty: 90 TABLET | Refills: 0 | Status: SHIPPED | OUTPATIENT
Start: 2024-09-03

## 2024-09-03 RX ORDER — VILAZODONE HYDROCHLORIDE 40 MG/1
40 TABLET ORAL DAILY
Qty: 90 TABLET | Refills: 0 | Status: SHIPPED | OUTPATIENT
Start: 2024-09-03

## 2024-09-03 NOTE — PATIENT INSTRUCTIONS
"Patient Education   Collaborative Care Psychiatry Service  What to Expect  Here's what to expect from your Collaborative Care Psychiatry Service (CCPS).   About CCPS  CCPS means 2 people work together to help you get better. You'll meet with a behavioral health clinician and a psychiatric doctor. A behavioral health clinician helps people with mental health problems by talking with them. A psychiatric doctor helps people by giving them medicine.  How it works  At every visit, you'll see the behavioral health clinician (BHC) first. They'll talk with you about how you're doing and teach you how to feel better.   Then you'll see the psychiatric doctor. This doctor can help you deal with troubling thoughts and feelings by giving you medicine. They'll make sure you know the plan for your care.   CCPS usually takes 3 to 6 visits. If you need more visits, we may have you start seeing a different psychiatric doctor for ongoing care.  If you have any questions or concerns, we'll be glad to talk with you.  About visits  Be open  At your visits, please talk openly about your problems. It may feel hard, but it's the best way for us to help you.  Cancelling visits  If you can't come to your visit, please call us right away at [phone]. If you don't cancel at least 24 hours (1 full day) before your visit, that's \"late cancellation.\"  Being late to visits  Being very late is the same as not showing up. You will be a \"no show\" if:  Your appointment starts with a BHC, and you're more than 15 minutes late for a 30-minute (half hour) visit. This will also cancel your appointment with the psychiatric doctor.  Your appointment is with a psychiatric doctor only, and you're more than 15 minutes late for a 30-minute (half hour) visit.  Your appointment is with a psychiatric doctor only, and you're more than 30 minutes late for a 60-minute (full hour) visit.  If you cancel late or don't show up 2 times within 6 months, we may end your " care.   Getting help between visits  If you need help between visits, you can call us Monday to Friday from 8 a.m. to 4:30 p.m. at [phone].  Emergency care  Call 911 or go to the nearest emergency department if your life or someone else's life is in danger.  Call 988 anytime to reach the national Suicide and Crisis hotline.  Medicine refills  To refill your medicine, call your pharmacy. You can also call [facility]'s Behavioral Access at [phone], Monday to Friday, 8 a.m. to 4:30 p.m. It can take 1 to 3 business days to get a refill.   Forms, letters, and tests  You may have papers to fill out, like FMLA, short-term disability, and workability. We can help you with these forms at your visits, but you must have an appointment. You may need more than 1 visit for this, to be in an intensive therapy program, or both.  Before we can give you medicine for ADHD, we may refer you to get tested for it or confirm it another way.  We may not be able to give you an emotional support animal letter.  We don't do mental health checks ordered by the court.   We don't do mental health testing, but we can refer you to get tested.   Thank you for choosing us for your care.  For informational purposes only. Not to replace the advice of your health care provider. Copyright   2022 [facility]. All rights reserved. Nobl [id number] - 12/22.       Moving from: Collaborative Care Psychiatry Service (CCPS)    Moving to: Referring Provider        We are returning your care back to your Referring Provider.      We will update your Referring Provider/Clinic that you've completed your care with CCPS. This way, they can help you build on the progress you've made in your mental health.        Here's what happens next:    Within the next 3 months: Please set up a visit with your referring provider. Ask for a mental health check-in.  Stay on your current medications--do not change your doses. Your symptoms could get  worse if you quickly stop or decrease your medicine.  We've refilled your mental health medications for the next 3 months (90 days). Future refills or dose changes should come from your Referring Provider Clinic.    If you're in therapy, keep it up! If you're not but would like to start, ask your Referring Provider clinic for a referral to therapy, or call [facility] ([phone]) to set up a visit with a therapist.        It's been a pleasure to work with you! If you and your clinic decide that you should return to [facility] in the future, we remain ready to serve you. Ask your clinic for a new referral if needed.

## 2024-09-03 NOTE — PROGRESS NOTES
Virtual Visit Details    Type of service:  Video Visit     Originating Location (pt. Location): Home    Distant Location (provider location):  Off-site  Platform used for Video Visit: [facility] [place] [address], [place], [place], [id number] [place] [address]  [phone]     Mode of Visit: Telemedicine Visit: The patient's condition can be safely assessed and treated via synchronous audio and visual telemedicine encounter.      Collaborative Care Psychiatry Service (CCPS)  Outpatient Psychiatric Progress note    IDENTIFYING DATA   Name:  Yvette Garcia   Preferred name: Melinda    : 1980/  year old     Melinda currently lives in [place] [address] with Spouse/Partner and Children. 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.)    PCP: Maryuri Jonas MD   Psychotherapist: Individual therapist   INTERIM HISTORY   CC: CCPS follow-up appointment  The patient was last seen on 24 for Return Visit, at last visit increased viibryd to 40mg a day   SUBJECTIVE    Started on CPAP (sleep study was completed 9 months ago)  Hasn't seen benefits yet with CPAP, is willing to try   Mood has been good, kids went back to school today  She thinks viibryd has been helpful at 40mg a day   More consistent mood   Fatigue is a little better, doesn't have the urge to take a nap most days   Social anxiety has still been there   Did couples work, then transitioning to long term ind therapy   Appetite has been good        PHQ9      2024    10:10 AM 2024    10:07 AM 2023     1:15 PM   PHQ   PHQ-9 Total Score 2  6   Q9: Thoughts of better off dead/self-harm past 2 weeks Not at all Not at all Not at all     GAD7      2024    10:07 AM 9/15/2023     8:59 AM 2023     2:02 PM   WILFREDO-7 SCORE   Total Score  2 (minimal anxiety) 10 (moderate anxiety)   Total Score 2 2 10     OBJECTIVE  "    Current Outpatient Medications   Medication Sig Dispense Refill    acetaminophen (TYLENOL) 325 MG tablet Take 2 tablets (650 mg) by mouth every 4 hours as needed for other (mild pain) 100 tablet 0    buPROPion (WELLBUTRIN XL) 300 MG 24 hr tablet Take 1 tablet (300 mg) by mouth every morning 90 tablet 3    cetirizine (ZYRTEC) 10 MG tablet Take 10 mg by mouth daily as needed for allergies      desogestrel-ethinyl estradiol (KARIVA) 0.15-0.02/0.01 MG (21/5) tablet Take 1 tablet by mouth daily 84 tablet 3    ibuprofen (ADVIL,MOTRIN) 400 MG tablet Take 1 tablet (400 mg) by mouth every 6 hours as needed for other (cramping) 30 tablet     ketoconazole (NIZORAL) 2 % external shampoo 1 APPLICATION DAILY TOPICALLY APPLY TO SCALP AND LEAVE ON FOR 5-10 MINUTES BEFORE RINSING.      spironolactone (ALDACTONE) 100 MG tablet 1 TABLET AT BEDTIME BY MOUTH ,DRINK AT LEAST 40OZ DAILY.      vilazodone (VIIBRYD) 40 MG TABS tablet Take 1 tablet (40 mg) by mouth daily 90 tablet 0     No current facility-administered medications for this visit.      [facility]  PDMP Review         Value Time User    State PDMP site checked  Yes 6/4/2024 10:35 AM Stefania White APRN CNP           Vitals There were no vitals taken for this visit.        No data to display              Labs:    Latest Reference Range & Units 11/29/23 11:42   T4 Free 0.90 - 1.70 ng/dL 0.99   TSH 0.30 - 4.20 uIU/mL 2.14       EKG: Most recent EKG from 9/11/2019 reviewed. QTc interval 401.  Sinus  Rhythm   -RSR(V1) -nondiagnostic.     MSE  Appearance: Awake, alert, adequately groomed, appeared stated age  Behavior: cooperative and pleasant  Eye Contact:  intact  Gait and motor coordination: normal   Psychomotor Behavior:  no evidence of tardive dyskinesia, dystonia, or tics  Attention and Concentration: Good  Speech: Clear, coherent, regular rate, rhythm and volume  Mood:  \"good\"  Affect:  neutral, anxious  Associations:  no loose associations  Orientation: oriented to time, place " and person  Thought process:  logical, linear, and goal-directed  Thought content: no evidence of suicidal ideation or homicidal ideation  Does not appear to be responding to internal stimuli.    Memory: Grossly intact as assessed by interview. Not formally assessed  Fund of knowledge: Average  Insight: Good  Judgement: Good  HISTORY   Psychotropic medications at evaluation 10/9/2023   Wellbutrin XL 150mg a day   Buspirone 30mg TWICE DAILY   Prozac 40mg a day   Propranolol 10mg QID AS NEEDED anxiety     Past Psychotropic Medication Trials  Citalopram (Celexa) stopped 1/2023 up to 30mg a day   Wellbutrin XL 450mg, did not help.   Prozac 40 - limited benefit  Buspirone - limited benefit  Propranolol - not needed, was stopped 4/24    Allergies   Allergen Reactions    Seasonal Allergies       Active Ambulatory Problems     Diagnosis Date Noted    Current moderate episode of major depressive disorder without prior episode (H) 09/11/2019    WILFREDO (generalized anxiety disorder) 09/11/2019     Resolved Ambulatory Problems     Diagnosis Date Noted    NO ACTIVE PROBLEMS 11/30/2012    Labor and delivery indication for care or intervention 09/06/2013    Active labor 09/06/2013    Normal delivery 09/06/2013    Indication for care in labor or delivery 05/18/2016    Liveborn infant 05/18/2016    Labor, precipitous, delivered 05/18/2016    Back pain 06/19/2018     Past Medical History:   Diagnosis Date    Depressive disorder     History of asthma     Uncomplicated asthma 1992      DIAGNOSIS   DSM   WILFREDO (generalized anxiety disorder)  Chronic fatigue     Medical Comorbidities: See above    ASSESSMENT   Formulation/MDM  For a more comprehensive formulation, refer to initial CCPS assessment. Pt C/o more social anxiety symptoms would like to try higher dose of viibryd. I have recommended functional medicine for chronic fatigue and with history of subclinical thyroid changes. 9/3/24: Improved mood symptoms and mild improvement in fatigue  with viibryd dosing and current regimen, Pt to continue with sleep medicine to target fatigue symptoms. Pt ready to return to referring provider.     Safety:  Low risk for harm towards self or others. Safety plan reviewed as indicated. Local community safety resources provided to patient to use if needed and reviewed for patient to use if needed.    Psychoeducation:   Medication side effects, treatment recommendations, risks/benefits and alternatives reviewed. Reviewed recommended treatment, risks, benefits, and alternatives, coordination of care with other clinicians, and medication education.   All questions addressed  PLAN   Follow up: Returned to Referring Provider -  schedule an appointment within the next 3 months with PCP for all medication refills and treatment - FINAL RETURN TO REFERRING PROVIDER TREATMENT PLAN at bottom of note  STATUS: Patient Status: The patient is being RETURNED to the referring provider for ongoing care and medication prescribing - See recommendations at bottom of note   Wellbutrin XL 300mg a day   Multivitamin   L methylfolate once a day up to 17mg a day   Vilazodone (Viibryd) 40mg a day   Vitamin D3 8251-5138 international units once a day   Labs/Orders: completed   Referrals: Therapy Referral submitted ([facility]). Call [facility] at [phone] if you do not hear from them soon Additional referrals sent via AVS  Pt has been referred to Psychological testing -pt will follow up on her own scheule   Continue all other treatments (including medications) per primary care provider and/or specialists, follow up with primary care provider as planned or for acute medical concerns.    GOKUL Clay, CNP, PMHNP  Collaborative Care Psychiatry Service (CCPS)  [facility]  ADMINISTRATIVE BILLING     Level of Medical Decision Making:   - At least 1 chronic problem that is not stable  - Engaged in prescription drug management during visit (discussed any medication benefits,  side effects, alternatives, etc.)  {Complexity / amount of data reviewed / analyzed (Optional):141118     EPISODE [x]   Disclaimer: This note consists of symbols derived from keyboarding, dictation and/or voice recognition software. As a result, there may be errors in the script that have gone undetected. Please consider this when interpreting information found in this chart.    RETURN TO REFERRING PROVIDER FINAL TREATMENT PLAN  The Psychiatric provider and/or Behavioral health clinician (BHC) have completed consultation with the patient and are returning to referring provider care for continued care. For worsening symptoms/condition recommendations include:    Medication Recommendations   No med changes are expected   - Consider increasing Bupropion XL to 450mg or tapering Wellbutrin XL and starting Buspirone to target anxiety symptoms  We have discussed functional medicine referrals for chronic fatigue     Behavioral Recommendations  Ind therapy         Consider pharmacologic and nonpharmacologic recommendations, if the patient has followed through on recommendations/referrals and any other helpful pertinent information (e.g., recent stressors, med adherence, enough time on the medication or high enough dose etc.)      If post consult recommendations fail, use any of the following options   Reach out to the CCPS provider through Epic staff message for guidance   Order an Adult Behavioral Health eConsult ([id number]) or Pediatric eConsult ([id number])   Refer for another CCPS consultation (after 6 months) or refer to Psychiatry/Long-term management (Mental Health Referral 9024, [facility]/Med management and Long-term management)

## 2024-09-03 NOTE — NURSING NOTE
Is the patient currently in the state of [place]? YES    Current patient location: [address] [place] [id number]    Visit mode:VIDEO    If the visit is dropped, the patient can be reconnected by: VIDEO VISIT: Text to cell phone:   Telephone Information:   Mobile [phone]       Will anyone else be joining the visit? No  (If patient encounters technical issues they should call [phone])    How would you like to obtain your AVS? MyChart    Are changes needed to the allergy or medication list? No    Rooming Documentation: Questionnaire(s) completed.    Reason for visit: GILBERTO Whittaker

## 2024-09-09 ENCOUNTER — MYC MEDICAL ADVICE (OUTPATIENT)
Dept: SLEEP MEDICINE | Facility: CLINIC | Age: 44
End: 2024-09-09
Payer: COMMERCIAL

## 2024-09-09 DIAGNOSIS — G47.33 OSA (OBSTRUCTIVE SLEEP APNEA): Primary | ICD-10-CM

## 2024-11-05 ENCOUNTER — DOCUMENTATION ONLY (OUTPATIENT)
Dept: HOME HEALTH SERVICES | Facility: CLINIC | Age: 44
End: 2024-11-05
Payer: COMMERCIAL

## 2024-11-19 ENCOUNTER — TRANSFERRED RECORDS (OUTPATIENT)
Dept: HEALTH INFORMATION MANAGEMENT | Facility: CLINIC | Age: 44
End: 2024-11-19
Payer: COMMERCIAL

## 2024-12-17 ENCOUNTER — TELEPHONE (OUTPATIENT)
Dept: PEDIATRICS | Facility: CLINIC | Age: 44
End: 2024-12-17

## 2025-03-15 SDOH — HEALTH STABILITY: PHYSICAL HEALTH: ON AVERAGE, HOW MANY MINUTES DO YOU ENGAGE IN EXERCISE AT THIS LEVEL?: 40 MIN

## 2025-03-15 SDOH — HEALTH STABILITY: PHYSICAL HEALTH: ON AVERAGE, HOW MANY DAYS PER WEEK DO YOU ENGAGE IN MODERATE TO STRENUOUS EXERCISE (LIKE A BRISK WALK)?: 3 DAYS

## 2025-03-15 ASSESSMENT — SOCIAL DETERMINANTS OF HEALTH (SDOH): HOW OFTEN DO YOU GET TOGETHER WITH FRIENDS OR RELATIVES?: ONCE A WEEK

## 2025-03-18 ENCOUNTER — OFFICE VISIT (OUTPATIENT)
Dept: PEDIATRICS | Facility: CLINIC | Age: 45
End: 2025-03-18
Payer: COMMERCIAL

## 2025-03-18 ENCOUNTER — ANCILLARY PROCEDURE (OUTPATIENT)
Dept: MAMMOGRAPHY | Facility: CLINIC | Age: 45
End: 2025-03-18
Attending: INTERNAL MEDICINE
Payer: COMMERCIAL

## 2025-03-18 VITALS
WEIGHT: 155.44 LBS | DIASTOLIC BLOOD PRESSURE: 83 MMHG | HEIGHT: 66 IN | RESPIRATION RATE: 16 BRPM | HEART RATE: 131 BPM | BODY MASS INDEX: 24.98 KG/M2 | SYSTOLIC BLOOD PRESSURE: 124 MMHG | TEMPERATURE: 98.7 F

## 2025-03-18 DIAGNOSIS — F41.1 GAD (GENERALIZED ANXIETY DISORDER): ICD-10-CM

## 2025-03-18 DIAGNOSIS — Z12.31 VISIT FOR SCREENING MAMMOGRAM: ICD-10-CM

## 2025-03-18 DIAGNOSIS — Z12.4 CERVICAL CANCER SCREENING: ICD-10-CM

## 2025-03-18 DIAGNOSIS — Z00.00 ROUTINE GENERAL MEDICAL EXAMINATION AT A HEALTH CARE FACILITY: Primary | ICD-10-CM

## 2025-03-18 DIAGNOSIS — Z30.41 ENCOUNTER FOR SURVEILLANCE OF CONTRACEPTIVE PILLS: ICD-10-CM

## 2025-03-18 DIAGNOSIS — F32.1 CURRENT MODERATE EPISODE OF MAJOR DEPRESSIVE DISORDER WITHOUT PRIOR EPISODE (H): ICD-10-CM

## 2025-03-18 PROCEDURE — 3079F DIAST BP 80-89 MM HG: CPT | Performed by: INTERNAL MEDICINE

## 2025-03-18 PROCEDURE — 99214 OFFICE O/P EST MOD 30 MIN: CPT | Mod: 25 | Performed by: INTERNAL MEDICINE

## 2025-03-18 PROCEDURE — 96127 BRIEF EMOTIONAL/BEHAV ASSMT: CPT | Performed by: INTERNAL MEDICINE

## 2025-03-18 PROCEDURE — 3074F SYST BP LT 130 MM HG: CPT | Performed by: INTERNAL MEDICINE

## 2025-03-18 PROCEDURE — 77067 SCR MAMMO BI INCL CAD: CPT | Mod: TC | Performed by: RADIOLOGY

## 2025-03-18 PROCEDURE — 99396 PREV VISIT EST AGE 40-64: CPT | Performed by: INTERNAL MEDICINE

## 2025-03-18 PROCEDURE — 1126F AMNT PAIN NOTED NONE PRSNT: CPT | Performed by: INTERNAL MEDICINE

## 2025-03-18 PROCEDURE — 77063 BREAST TOMOSYNTHESIS BI: CPT | Mod: TC | Performed by: RADIOLOGY

## 2025-03-18 PROCEDURE — G2211 COMPLEX E/M VISIT ADD ON: HCPCS | Performed by: INTERNAL MEDICINE

## 2025-03-18 RX ORDER — VILAZODONE HYDROCHLORIDE 40 MG/1
40 TABLET ORAL DAILY
Qty: 90 TABLET | Refills: 3 | Status: SHIPPED | OUTPATIENT
Start: 2025-03-18

## 2025-03-18 RX ORDER — SCOPOLAMINE 1 MG/3D
PATCH, EXTENDED RELEASE TRANSDERMAL
COMMUNITY
Start: 2025-01-31

## 2025-03-18 RX ORDER — BUPROPION HYDROCHLORIDE 300 MG/1
300 TABLET ORAL EVERY MORNING
Qty: 90 TABLET | Refills: 3 | Status: SHIPPED | OUTPATIENT
Start: 2025-03-18

## 2025-03-18 RX ORDER — DESOGESTREL AND ETHINYL ESTRADIOL 21-5 (28)
1 KIT ORAL DAILY
Qty: 84 TABLET | Refills: 4 | Status: SHIPPED | OUTPATIENT
Start: 2025-03-18

## 2025-03-18 ASSESSMENT — PATIENT HEALTH QUESTIONNAIRE - PHQ9
SUM OF ALL RESPONSES TO PHQ QUESTIONS 1-9: 9
SUM OF ALL RESPONSES TO PHQ QUESTIONS 1-9: 9
10. IF YOU CHECKED OFF ANY PROBLEMS, HOW DIFFICULT HAVE THESE PROBLEMS MADE IT FOR YOU TO DO YOUR WORK, TAKE CARE OF THINGS AT HOME, OR GET ALONG WITH OTHER PEOPLE: SOMEWHAT DIFFICULT

## 2025-03-18 ASSESSMENT — PAIN SCALES - GENERAL: PAINLEVEL_OUTOF10: NO PAIN (0)

## 2025-03-18 NOTE — PROGRESS NOTES
"Preventive Care Visit  [facility] ANGÉLICA Jonas MD, Internal Medicine - Pediatrics  Mar 18, 2025      Assessment & Plan     Routine general medical examination at a health care facility    WILFREDO (generalized anxiety disorder)  - Stable, no side effects with medications, refilled meds today    - buPROPion (WELLBUTRIN XL) 300 MG 24 hr tablet; Take 1 tablet (300 mg) by mouth every morning.  - vilazodone (VIIBRYD) 40 MG TABS tablet; Take 1 tablet (40 mg) by mouth daily.    Current moderate episode of major depressive disorder without prior episode (H)  - Stable, no side effects with medications, refilled meds today    - buPROPion (WELLBUTRIN XL) 300 MG 24 hr tablet; Take 1 tablet (300 mg) by mouth every morning.    Encounter for surveillance of contraceptive pills  - Stable, no side effects with medications, refilled meds today  -  plans for vasectomy and she would like to trial off of OCP to see if mood improves.    - desogestrel-ethinyl estradiol (KARIVA) 0.15-0.02/0.01 MG (21/5) tablet; Take 1 tablet by mouth daily.    Cervical cancer screening  - HPV and Gynecologic Cytology Panel - Recommended Age 30 - 65 Years      The longitudinal plan of care for the diagnosis(es)/condition(s) as documented were addressed during this visit. Due to the added complexity in care, I will continue to support Melinda in the subsequent management and with ongoing continuity of care.      BMI  Estimated body mass index is 25.43 kg/m  as calculated from the following:    Height as of this encounter: 1.665 m (5' 5.55\").    Weight as of this encounter: 70.5 kg (155 lb 7 oz).       Counseling  Appropriate preventive services were addressed with this patient via screening, questionnaire, or discussion as appropriate for fall prevention, nutrition, physical activity, Tobacco-use cessation, social engagement, weight loss and cognition.  Checklist reviewing preventive services available has been given to the " patient.  Reviewed patient's diet, addressing concerns and/or questions.   She is at risk for lack of exercise and has been provided with information to increase physical activity for the benefit of her well-being.   She is at risk for psychosocial distress and has been provided with information to reduce risk.   The patient's PHQ-9 score is consistent with mild depression. She was provided with information regarding depression.       See Patient Instructions    Subjective   Melinda is a 44 year old, presenting for the following:  Annual Visit        3/18/2025     9:27 AM   Additional Questions   Roomed by Honey Lofton CMA   Accompanied by N/A         3/18/2025     9:27 AM   Patient Reported Additional Medications   Patient reports taking the following new medications N/A          HPI       Answers submitted by the patient for this visit:  Patient Health Questionnaire (Submitted on 3/18/2025)  If you checked off any problems, how difficult have these problems made it for you to do your work, take care of things at home, or get along with other people?: Somewhat difficult  PHQ9 TOTAL SCORE: 9      Advance Care Planning  Patient does not have a Health Care Directive:       3/15/2025   General Health   How would you rate your overall physical health? Good   Feel stress (tense, anxious, or unable to sleep) To some extent   (!) STRESS CONCERN      3/15/2025   Nutrition   Three or more servings of calcium each day? Yes   Diet: Regular (no restrictions)   How many servings of fruit and vegetables per day? (!) 2-3   How many sweetened beverages each day? 0-1         3/15/2025   Exercise   Days per week of moderate/strenous exercise 3 days   Average minutes spent exercising at this level 40 min         3/15/2025   Social Factors   Frequency of gathering with friends or relatives Once a week   Worry food won't last until get money to buy more No   Food not last or not have enough money for food? No   Do you have housing?  (Housing is defined as stable permanent housing and does not include staying ouside in a car, in a tent, in an abandoned building, in an overnight shelter, or couch-surfing.) Yes   Are you worried about losing your housing? No   Lack of transportation? No   Unable to get utilities (heat,electricity)? No         3/15/2025   Dental   Dentist two times every year? Yes         Today's PHQ-9 Score:       3/18/2025     9:22 AM   PHQ-9 SCORE   PHQ-9 Total Score MyChart 9 (Mild depression)   PHQ-9 Total Score 9        Patient-reported         3/15/2025   Substance Use   Alcohol more than 3/day or more than 7/wk No   Do you use any other substances recreationally? No     Social History     Tobacco Use    Smoking status: Never     Passive exposure: Never    Smokeless tobacco: Never   Vaping Use    Vaping status: Never Used   Substance Use Topics    Alcohol use: Yes     Comment: 1 drink weekly     Drug use: No           1/8/2024   LAST FHS-7 RESULTS   1st degree relative breast or ovarian cancer Yes   Any relative bilateral breast cancer No   Any male have breast cancer No   Any ONE woman have BOTH breast AND ovarian cancer No   Any woman with breast cancer before 50yrs No   2 or more relatives with breast AND/OR ovarian cancer No   2 or more relatives with breast AND/OR bowel cancer No        Mammogram Screening - Mammogram every 1-2 years updated in Health Maintenance based on mutual decision making        3/15/2025   STI Screening   New sexual partner(s) since last STI/HIV test? No     History of abnormal Pap smear: No - age 30-64 HPV with reflex Pap every 5 years recommended        Latest Ref Rng & Units 10/24/2019    10:18 AM 10/24/2019     9:49 AM   PAP / HPV   PAP (Historical)   NIL    HPV 16 DNA NEG^Negative Negative     HPV 18 DNA NEG^Negative Negative     Other HR HPV NEG^Negative Negative       ASCVD Risk   The 10-year ASCVD risk score (Ashia BRADLEY, et al., 2019) is: 0.5%    Values used to calculate the  "score:      Age: 44 years      Sex: Female      Is Non- : No      Diabetic: No      Tobacco smoker: No      Systolic Blood Pressure: 124 mmHg      Is BP treated: No      HDL Cholesterol: 51 mg/dL      Total Cholesterol: 174 mg/dL        3/15/2025   Contraception/Family Planning   Questions about contraception or family planning No        Reviewed and updated as needed this visit by Provider                        All other systems on a 10-point review are negative, unless otherwise noted in HPI       Objective    Exam  /83 (BP Location: Right arm, Patient Position: Sitting, Cuff Size: Adult Regular)   Pulse (!) 131   Temp 98.7  F (37.1  C) (Oral)   Resp 16   Ht 1.665 m (5' 5.55\")   Wt 70.5 kg (155 lb 7 oz)   BMI 25.43 kg/m     Estimated body mass index is 25.43 kg/m  as calculated from the following:    Height as of this encounter: 1.665 m (5' 5.55\").    Weight as of this encounter: 70.5 kg (155 lb 7 oz).    Physical Exam  GENERAL: alert and no distress  EYES: Eyes grossly normal to inspection, PERRL and conjunctivae and sclerae normal  HENT: ear canals and TM's normal, nose and mouth without ulcers or lesions  NECK: no adenopathy, no asymmetry, masses, or scars  RESP: lungs clear to auscultation - no rales, rhonchi or wheezes  CV: regular rate and rhythm, normal S1 S2, no S3 or S4, no murmur, click or rub, no peripheral edema  ABDOMEN: soft, nontender, no hepatosplenomegaly, no masses and bowel sounds normal  MS: no gross musculoskeletal defects noted, no edema  SKIN: no suspicious lesions or rashes  NEURO: Normal strength and tone, mentation intact and speech normal  PSYCH: mentation appears normal, affect normal/bright        Signed Electronically by: Maryuri Jonas MD    "

## 2025-03-18 NOTE — PATIENT INSTRUCTIONS
Patient Education   Preventive Care Advice   This is general advice given by our system to help you stay healthy. However, your care team may have specific advice just for you. Please talk to your care team about your preventive care needs.  Nutrition  Eat 5 or more servings of fruits and vegetables each day.  Try wheat bread, brown rice and whole grain pasta (instead of white bread, rice, and pasta).  Get enough calcium and vitamin D. Check the label on foods and aim for 100% of the RDA (recommended daily allowance).  Lifestyle  Exercise at least 150 minutes each week  (30 minutes a day, 5 days a week).  Do muscle strengthening activities 2 days a week. These help control your weight and prevent disease.  No smoking.  Wear sunscreen to prevent skin cancer.  Have a dental exam and cleaning every 6 months.  Yearly exams  See your health care team every year to talk about:  Any changes in your health.  Any medicines your care team has prescribed.  Preventive care, family planning, and ways to prevent chronic diseases.  Shots (vaccines)   HPV shots (up to age 26), if you've never had them before.  Hepatitis B shots (up to age 59), if you've never had them before.  COVID-19 shot: Get this shot when it's due.  Flu shot: Get a flu shot every year.  Tetanus shot: Get a tetanus shot every 10 years.  Pneumococcal, hepatitis A, and RSV shots: Ask your care team if you need these based on your risk.  Shingles shot (for age 50 and up)  General health tests  Diabetes screening:  Starting at age 35, Get screened for diabetes at least every 3 years.  If you are younger than age 35, ask your care team if you should be screened for diabetes.  Cholesterol test: At age 39, start having a cholesterol test every 5 years, or more often if advised.  Bone density scan (DEXA): At age 50, ask your care team if you should have this scan for osteoporosis (brittle bones).  Hepatitis C: Get tested at least once in your life.  STIs (sexually  transmitted infections)  Before age 24: Ask your care team if you should be screened for STIs.  After age 24: Get screened for STIs if you're at risk. You are at risk for STIs (including HIV) if:  You are sexually active with more than one person.  You don't use condoms every time.  You or a partner was diagnosed with a sexually transmitted infection.  If you are at risk for HIV, ask about PrEP medicine to prevent HIV.  Get tested for HIV at least once in your life, whether you are at risk for HIV or not.  Cancer screening tests  Cervical cancer screening: If you have a cervix, begin getting regular cervical cancer screening tests starting at age 21.  Breast cancer scan (mammogram): If you've ever had breasts, begin having regular mammograms starting at age 40. This is a scan to check for breast cancer.  Colon cancer screening: It is important to start screening for colon cancer at age 45.  Have a colonoscopy test every 10 years (or more often if you're at risk) Or, ask your provider about stool tests like a FIT test every year or Cologuard test every 3 years.  To learn more about your testing options, visit:   .  For help making a decision, visit:   https://bit.ly/qg02896.  Prostate cancer screening test: If you have a prostate, ask your care team if a prostate cancer screening test (PSA) at age 55 is right for you.  Lung cancer screening: If you are a current or former smoker ages 50 to 80, ask your care team if ongoing lung cancer screenings are right for you.  For informational purposes only. Not to replace the advice of your health care provider. Copyright   2023 [facility]. All rights reserved. Clinically reviewed by the [facility]. MyScreen [id number] - REV 01/24.  Learning About Stress  What is stress?     Stress is your body's response to a hard situation. Your body can have a physical, emotional, or mental response. Stress is a fact of life for most people, and it  affects everyone differently. What causes stress for you may not be stressful for someone else.  A lot of things can cause stress. You may feel stress when you go on a job interview, take a test, or run a race. This kind of short-term stress is normal and even useful. It can help you if you need to work hard or react quickly. For example, stress can help you finish an important job on time.  Long-term stress is caused by ongoing stressful situations or events. Examples of long-term stress include long-term health problems, ongoing problems at work, or conflicts in your family. Long-term stress can harm your health.  How does stress affect your health?  When you are stressed, your body responds as though you are in danger. It makes hormones that speed up your heart, make you breathe faster, and give you a burst of energy. This is called the fight-or-flight stress response. If the stress is over quickly, your body goes back to normal and no harm is done.  But if stress happens too often or lasts too long, it can have bad effects. Long-term stress can make you more likely to get sick, and it can make symptoms of some diseases worse. If you tense up when you are stressed, you may develop neck, shoulder, or low back pain. Stress is linked to high blood pressure and heart disease.  Stress also harms your emotional health. It can make you cody, tense, or depressed. Your relationships may suffer, and you may not do well at work or school.  What can you do to manage stress?  You can try these things to help manage stress:   Do something active. Exercise or activity can help reduce stress. Walking is a great way to get started. Even everyday activities such as housecleaning or yard work can help.  Try yoga or jaclyn chi. These techniques combine exercise and meditation. You may need some training at first to learn them.  Do something you enjoy. For example, listen to music or go to a movie. Practice your hobby or do volunteer  "work.  Meditate. This can help you relax, because you are not worrying about what happened before or what may happen in the future.  Do guided imagery. Imagine yourself in any setting that helps you feel calm. You can use online videos, books, or a teacher to guide you.  Do breathing exercises. For example:  From a standing position, bend forward from the waist with your knees slightly bent. Let your arms dangle close to the floor.  Breathe in slowly and deeply as you return to a standing position. Roll up slowly and lift your head last.  Hold your breath for just a few seconds in the standing position.  Breathe out slowly and bend forward from the waist.  Let your feelings out. Talk, laugh, cry, and express anger when you need to. Talking with supportive friends or family, a counselor, or a bisi leader about your feelings is a healthy way to relieve stress. Avoid discussing your feelings with people who make you feel worse.  Write. It may help to write about things that are bothering you. This helps you find out how much stress you feel and what is causing it. When you know this, you can find better ways to cope.  What can you do to prevent stress?  You might try some of these things to help prevent stress:  Manage your time. This helps you find time to do the things you want and need to do.  Get enough sleep. Your body recovers from the stresses of the day while you are sleeping.  Get support. Your family, friends, and community can make a difference in how you experience stress.  Limit your news feed. Avoid or limit time on social media or news that may make you feel stressed.  Do something active. Exercise or activity can help reduce stress. Walking is a great way to get started.  Where can you learn more?  Go to https://www.[employer].net/patiented  Enter N032 in the search box to learn more about \"Learning About Stress.\"  Current as of: October 24, 2024  Content Version: 14.4 2024-2025 [employer]   Care instructions adapted under license by your healthcare professional. If you have questions about a medical condition or this instruction, always ask your healthcare professional. [employer] disclaims any warranty or liability for your use of this information.    Learning About Depression Screening  What is depression screening?  Depression screening is a way to see if you have depression symptoms. It may be done by a doctor or counselor. It's often part of a routine checkup. That's because your mental health is just as important as your physical health.  Depression is a mental health condition that affects how you feel, think, and act. You may:  Have less energy.  Lose interest in your daily activities.  Feel sad and grouchy for a long time.  Depression is very common. It affects people of all ages.  Many things can lead to depression. Some people become depressed after they have a stroke or find out they have a major illness like cancer or heart disease. The death of a loved one or a breakup may lead to depression. It can run in families. Most experts believe that a combination of inherited genes and stressful life events can cause it.  What happens during screening?  You may be asked to fill out a form about your depression symptoms. You and the doctor will discuss your answers. The doctor may ask you more questions to learn more about how you think, act, and feel.  What happens after screening?  If you have symptoms of depression, your doctor will talk to you about your options.  Doctors usually treat depression with medicines or counseling. Often, combining the two works best. Many people don't get help because they think that they'll get over the depression on their own. But people with depression may not get better unless they get treatment.  The cause of depression is not well understood. There may be many factors involved. But if you have depression, it's not your fault.  A serious  "symptom of depression is thinking about death or suicide. If you or someone you care about talks about this or about feeling hopeless, get help right away.  It's important to know that depression can be treated. Medicine, counseling, and self-care may help.  Where can you learn more?  Go to https://www.[employer].net/patiented  Enter T185 in the search box to learn more about \"Learning About Depression Screening.\"  Current as of: July 31, 2024  Content Version: 14.4    7208-5829 [employer].   Care instructions adapted under license by your healthcare professional. If you have questions about a medical condition or this instruction, always ask your healthcare professional. [employer] disclaims any warranty or liability for your use of this information.       "

## 2025-03-20 LAB
HPV HR 12 DNA CVX QL NAA+PROBE: NEGATIVE
HPV16 DNA CVX QL NAA+PROBE: NEGATIVE
HPV18 DNA CVX QL NAA+PROBE: NEGATIVE
HUMAN PAPILLOMA VIRUS FINAL DIAGNOSIS: NORMAL

## (undated) DEVICE — ENDO TROCAR FIRST ENTRY KII FIOS Z-THRD 11X100MM CTF33

## (undated) DEVICE — ESU CORD MONOPOLAR 10'  E0510

## (undated) DEVICE — GLOVE PROTEXIS W/NEU-THERA 6.5  2D73TE65

## (undated) DEVICE — NDL INSUFFLATION 120MM VERRES

## (undated) DEVICE — Device

## (undated) DEVICE — ENDO POUCH GOLD 10MM ECATCH 173050G

## (undated) DEVICE — SU MONOCRYL 4-0 PS-2 18" UND Y496G

## (undated) DEVICE — SU VICRYL 0 UR-6 27" J603H

## (undated) DEVICE — SOL NACL 0.9% IRRIG 1000ML BOTTLE 07138-09

## (undated) DEVICE — LINEN TOWEL PACK X5 5464

## (undated) DEVICE — ESU LIGASURE LAPAROSCOPIC BLUNT TIP SEALER 5MMX37CM LF1637

## (undated) DEVICE — GLOVE PROTEXIS W/NEU-THERA 6.0  2D73TE60

## (undated) DEVICE — ENDO TROCAR FIRST ENTRY KII FIOS Z-THRD 05X100MM CTF03

## (undated) DEVICE — DEVICE SUTURE GRASPER TROCAR CLOSURE 14GAX15CM PMI-TC-SG

## (undated) RX ORDER — DEXAMETHASONE SODIUM PHOSPHATE 4 MG/ML
INJECTION, SOLUTION INTRA-ARTICULAR; INTRALESIONAL; INTRAMUSCULAR; INTRAVENOUS; SOFT TISSUE
Status: DISPENSED
Start: 2017-08-27

## (undated) RX ORDER — HYDROMORPHONE HYDROCHLORIDE 1 MG/ML
INJECTION, SOLUTION INTRAMUSCULAR; INTRAVENOUS; SUBCUTANEOUS
Status: DISPENSED
Start: 2017-08-27

## (undated) RX ORDER — LIDOCAINE HYDROCHLORIDE 20 MG/ML
INJECTION, SOLUTION EPIDURAL; INFILTRATION; INTRACAUDAL; PERINEURAL
Status: DISPENSED
Start: 2017-08-27

## (undated) RX ORDER — PROPOFOL 10 MG/ML
INJECTION, EMULSION INTRAVENOUS
Status: DISPENSED
Start: 2017-08-27

## (undated) RX ORDER — ONDANSETRON 2 MG/ML
INJECTION INTRAMUSCULAR; INTRAVENOUS
Status: DISPENSED
Start: 2017-08-27

## (undated) RX ORDER — FENTANYL CITRATE 50 UG/ML
INJECTION, SOLUTION INTRAMUSCULAR; INTRAVENOUS
Status: DISPENSED
Start: 2017-08-27

## (undated) RX ORDER — GLYCOPYRROLATE 0.2 MG/ML
INJECTION, SOLUTION INTRAMUSCULAR; INTRAVENOUS
Status: DISPENSED
Start: 2017-08-27

## (undated) RX ORDER — KETOROLAC TROMETHAMINE 30 MG/ML
INJECTION, SOLUTION INTRAMUSCULAR; INTRAVENOUS
Status: DISPENSED
Start: 2017-08-27